# Patient Record
Sex: MALE | Race: WHITE | URBAN - METROPOLITAN AREA
[De-identification: names, ages, dates, MRNs, and addresses within clinical notes are randomized per-mention and may not be internally consistent; named-entity substitution may affect disease eponyms.]

---

## 2017-02-17 ENCOUNTER — DOCTOR'S OFFICE (OUTPATIENT)
Dept: URBAN - METROPOLITAN AREA CLINIC 136 | Facility: CLINIC | Age: 62
Setting detail: OPHTHALMOLOGY
End: 2017-02-17
Payer: MEDICARE

## 2017-02-17 DIAGNOSIS — H40.003: ICD-10-CM

## 2017-02-17 DIAGNOSIS — E10.3313: ICD-10-CM

## 2017-02-17 DIAGNOSIS — H04.123: ICD-10-CM

## 2017-02-17 DIAGNOSIS — E10.3311: ICD-10-CM

## 2017-02-17 DIAGNOSIS — H25.13: ICD-10-CM

## 2017-02-17 PROCEDURE — 67210 TREATMENT OF RETINAL LESION: CPT | Performed by: OPHTHALMOLOGY

## 2017-02-17 PROCEDURE — 92134 CPTRZ OPH DX IMG PST SGM RTA: CPT | Performed by: OPHTHALMOLOGY

## 2017-02-17 PROCEDURE — 92014 COMPRE OPH EXAM EST PT 1/>: CPT | Performed by: OPHTHALMOLOGY

## 2017-02-17 ASSESSMENT — REFRACTION_MANIFEST
OS_VA3: 20/
OU_VA: 20/
OS_VA3: 20/
OS_VA1: 20/
OD_VA3: 20/
OD_VA1: 20/
OD_VA2: 20/
OD_VA1: 20/
OS_VA2: 20/
OD_VA2: 20/
OS_VA2: 20/
OU_VA: 20/
OD_VA3: 20/
OS_VA1: 20/

## 2017-02-17 ASSESSMENT — REFRACTION_CURRENTRX
OS_CYLINDER: -0.75
OD_OVR_VA: 20/
OD_OVR_VA: 20/
OD_CYLINDER: -0.75
OS_OVR_VA: 20/
OS_VPRISM_DIRECTION: SV
OD_SPHERE: +3.50
OD_AXIS: 100
OS_AXIS: 069
OS_OVR_VA: 20/
OD_OVR_VA: 20/
OS_OVR_VA: 20/
OS_SPHERE: +0.50
OD_VPRISM_DIRECTION: SV

## 2017-02-17 ASSESSMENT — SUPERFICIAL PUNCTATE KERATITIS (SPK)
OD_SPK: 2+
OS_SPK: 2+

## 2017-02-17 ASSESSMENT — REFRACTION_OUTSIDERX
OD_VA2: 20/20
OS_CYLINDER: -0.50
OD_CYLINDER: SPH
OS_VA1: 20/20-2
OD_SPHERE: +1.50
OD_VA1: 20/20-1
OS_VA3: 20/
OU_VA: 20/20
OD_VA3: 20/
OS_ADD: +2.50
OS_AXIS: 055
OS_SPHERE: +1.25
OS_VA2: 20/20
OD_ADD: +2.50

## 2017-02-17 ASSESSMENT — REFRACTION_AUTOREFRACTION
OD_SPHERE: +0.75
OS_AXIS: 046
OD_AXIS: 070
OD_CYLINDER: -0.25
OS_CYLINDER: -2.50
OS_SPHERE: +2.00

## 2017-02-17 ASSESSMENT — SPHEQUIV_DERIVED
OS_SPHEQUIV: 0.75
OD_SPHEQUIV: 0.625

## 2017-02-17 ASSESSMENT — VISUAL ACUITY
OD_BCVA: 20/25+2
OS_BCVA: 20/25-2

## 2017-02-17 ASSESSMENT — CONFRONTATIONAL VISUAL FIELD TEST (CVF)
OS_FINDINGS: FULL
OD_FINDINGS: FULL

## 2017-04-17 ENCOUNTER — DOCTOR'S OFFICE (OUTPATIENT)
Dept: URBAN - METROPOLITAN AREA CLINIC 136 | Facility: CLINIC | Age: 62
Setting detail: OPHTHALMOLOGY
End: 2017-04-17
Payer: MEDICARE

## 2017-04-17 DIAGNOSIS — E10.3311: ICD-10-CM

## 2017-04-17 DIAGNOSIS — E10.3212: ICD-10-CM

## 2017-04-17 DIAGNOSIS — H04.123: ICD-10-CM

## 2017-04-17 DIAGNOSIS — H25.13: ICD-10-CM

## 2017-04-17 DIAGNOSIS — H40.003: ICD-10-CM

## 2017-04-17 PROCEDURE — 99024 POSTOP FOLLOW-UP VISIT: CPT | Performed by: OPHTHALMOLOGY

## 2017-04-17 PROCEDURE — 92134 CPTRZ OPH DX IMG PST SGM RTA: CPT | Performed by: OPHTHALMOLOGY

## 2017-04-17 ASSESSMENT — REFRACTION_MANIFEST
OD_VA3: 20/
OS_VA2: 20/
OU_VA: 20/
OS_VA2: 20/
OS_VA3: 20/
OD_VA3: 20/
OD_VA2: 20/
OD_VA1: 20/
OS_VA3: 20/
OU_VA: 20/
OD_VA1: 20/
OS_VA1: 20/
OD_VA2: 20/
OS_VA1: 20/

## 2017-04-17 ASSESSMENT — SPHEQUIV_DERIVED
OD_SPHEQUIV: 0.625
OS_SPHEQUIV: 0.75

## 2017-04-17 ASSESSMENT — REFRACTION_CURRENTRX
OS_OVR_VA: 20/
OS_AXIS: 069
OS_VPRISM_DIRECTION: SV
OS_OVR_VA: 20/
OD_AXIS: 100
OD_OVR_VA: 20/
OD_OVR_VA: 20/
OS_SPHERE: +0.50
OS_CYLINDER: -0.75
OS_OVR_VA: 20/
OD_CYLINDER: -0.75
OD_SPHERE: +3.50
OD_OVR_VA: 20/
OD_VPRISM_DIRECTION: SV

## 2017-04-17 ASSESSMENT — VISUAL ACUITY
OS_BCVA: 20/25+3
OD_BCVA: 20/20-2

## 2017-04-17 ASSESSMENT — REFRACTION_AUTOREFRACTION
OS_CYLINDER: -2.50
OS_SPHERE: +2.00
OS_AXIS: 046
OD_SPHERE: +0.75
OD_AXIS: 070
OD_CYLINDER: -0.25

## 2017-04-17 ASSESSMENT — SUPERFICIAL PUNCTATE KERATITIS (SPK)
OD_SPK: 2+
OS_SPK: 2+

## 2017-04-17 ASSESSMENT — REFRACTION_OUTSIDERX
OU_VA: 20/20
OD_VA1: 20/20-1
OS_SPHERE: +1.25
OS_ADD: +2.50
OS_VA3: 20/
OS_VA2: 20/20
OS_VA1: 20/20-2
OD_CYLINDER: SPH
OS_CYLINDER: -0.50
OS_AXIS: 055
OD_SPHERE: +1.50
OD_VA3: 20/
OD_ADD: +2.50
OD_VA2: 20/20

## 2017-04-17 ASSESSMENT — CONFRONTATIONAL VISUAL FIELD TEST (CVF)
OS_FINDINGS: FULL
OD_FINDINGS: FULL

## 2017-06-19 ENCOUNTER — DOCTOR'S OFFICE (OUTPATIENT)
Dept: URBAN - METROPOLITAN AREA CLINIC 136 | Facility: CLINIC | Age: 62
Setting detail: OPHTHALMOLOGY
End: 2017-06-19
Payer: MEDICARE

## 2017-06-19 DIAGNOSIS — E10.3212: ICD-10-CM

## 2017-06-19 DIAGNOSIS — H40.003: ICD-10-CM

## 2017-06-19 DIAGNOSIS — H04.123: ICD-10-CM

## 2017-06-19 DIAGNOSIS — H25.13: ICD-10-CM

## 2017-06-19 DIAGNOSIS — E10.3311: ICD-10-CM

## 2017-06-19 PROCEDURE — 92134 CPTRZ OPH DX IMG PST SGM RTA: CPT | Performed by: OPHTHALMOLOGY

## 2017-06-19 PROCEDURE — 92014 COMPRE OPH EXAM EST PT 1/>: CPT | Performed by: OPHTHALMOLOGY

## 2017-06-19 ASSESSMENT — REFRACTION_OUTSIDERX
OU_VA: 20/20
OS_ADD: +2.50
OD_SPHERE: +1.50
OD_VA2: 20/20
OS_CYLINDER: -0.50
OS_AXIS: 055
OS_VA3: 20/
OD_VA3: 20/
OS_VA2: 20/20
OS_SPHERE: +1.25
OD_ADD: +2.50
OD_CYLINDER: SPH
OD_VA1: 20/20-1
OS_VA1: 20/20-2

## 2017-06-19 ASSESSMENT — REFRACTION_MANIFEST
OD_VA1: 20/
OS_VA2: 20/
OS_VA1: 20/
OS_VA2: 20/
OD_VA2: 20/
OS_VA3: 20/
OU_VA: 20/
OD_VA3: 20/
OD_VA3: 20/
OS_VA3: 20/
OU_VA: 20/
OD_VA2: 20/
OS_VA1: 20/
OD_VA1: 20/

## 2017-06-19 ASSESSMENT — REFRACTION_CURRENTRX
OD_OVR_VA: 20/
OS_VPRISM_DIRECTION: SV
OD_CYLINDER: -0.75
OS_AXIS: 069
OS_CYLINDER: -0.75
OS_SPHERE: +0.50
OD_OVR_VA: 20/
OS_OVR_VA: 20/
OS_OVR_VA: 20/
OD_VPRISM_DIRECTION: SV
OD_SPHERE: +3.50
OD_AXIS: 100
OS_OVR_VA: 20/
OD_OVR_VA: 20/

## 2017-06-19 ASSESSMENT — SUPERFICIAL PUNCTATE KERATITIS (SPK)
OD_SPK: 2+
OS_SPK: 2+

## 2017-06-19 ASSESSMENT — VISUAL ACUITY
OS_BCVA: 20/25+2
OD_BCVA: 20/25-1

## 2017-06-19 ASSESSMENT — SPHEQUIV_DERIVED
OD_SPHEQUIV: 0.625
OS_SPHEQUIV: 0.75

## 2017-06-19 ASSESSMENT — REFRACTION_AUTOREFRACTION
OD_AXIS: 070
OD_CYLINDER: -0.25
OS_AXIS: 046
OS_CYLINDER: -2.50
OD_SPHERE: +0.75
OS_SPHERE: +2.00

## 2017-06-19 ASSESSMENT — CONFRONTATIONAL VISUAL FIELD TEST (CVF)
OS_FINDINGS: FULL
OD_FINDINGS: FULL

## 2017-08-24 ENCOUNTER — DOCTOR'S OFFICE (OUTPATIENT)
Dept: URBAN - METROPOLITAN AREA CLINIC 136 | Facility: CLINIC | Age: 62
Setting detail: OPHTHALMOLOGY
End: 2017-08-24
Payer: MEDICARE

## 2017-08-24 DIAGNOSIS — H04.123: ICD-10-CM

## 2017-08-24 DIAGNOSIS — H25.13: ICD-10-CM

## 2017-08-24 DIAGNOSIS — E10.3212: ICD-10-CM

## 2017-08-24 DIAGNOSIS — E10.3311: ICD-10-CM

## 2017-08-24 DIAGNOSIS — H40.003: ICD-10-CM

## 2017-08-24 PROCEDURE — 92014 COMPRE OPH EXAM EST PT 1/>: CPT | Performed by: OPHTHALMOLOGY

## 2017-08-24 PROCEDURE — 92134 CPTRZ OPH DX IMG PST SGM RTA: CPT | Performed by: OPHTHALMOLOGY

## 2017-08-24 ASSESSMENT — REFRACTION_MANIFEST
OS_VA2: 20/
OU_VA: 20/
OU_VA: 20/
OS_VA2: 20/
OD_VA1: 20/
OS_VA3: 20/
OS_VA1: 20/
OS_VA3: 20/
OD_VA2: 20/
OD_VA3: 20/
OD_VA1: 20/
OS_VA1: 20/
OD_VA2: 20/
OD_VA3: 20/

## 2017-08-24 ASSESSMENT — REFRACTION_CURRENTRX
OD_OVR_VA: 20/
OD_AXIS: 100
OD_VPRISM_DIRECTION: SV
OS_OVR_VA: 20/
OS_VPRISM_DIRECTION: SV
OD_SPHERE: +3.50
OD_OVR_VA: 20/
OS_OVR_VA: 20/
OD_CYLINDER: -0.75
OD_OVR_VA: 20/
OS_CYLINDER: -0.75
OS_OVR_VA: 20/
OS_SPHERE: +0.50
OS_AXIS: 069

## 2017-08-24 ASSESSMENT — CONFRONTATIONAL VISUAL FIELD TEST (CVF)
OD_FINDINGS: FULL
OS_FINDINGS: FULL

## 2017-08-24 ASSESSMENT — REFRACTION_OUTSIDERX
OD_VA1: 20/20-1
OD_ADD: +2.50
OS_SPHERE: +1.25
OD_SPHERE: +1.50
OS_VA1: 20/20-2
OS_ADD: +2.50
OS_VA3: 20/
OD_CYLINDER: SPH
OD_VA3: 20/
OU_VA: 20/20
OS_AXIS: 055
OS_CYLINDER: -0.50
OS_VA2: 20/20
OD_VA2: 20/20

## 2017-08-24 ASSESSMENT — REFRACTION_AUTOREFRACTION
OS_SPHERE: +2.00
OD_AXIS: 070
OS_CYLINDER: -2.50
OS_AXIS: 046
OD_SPHERE: +0.75
OD_CYLINDER: -0.25

## 2017-08-24 ASSESSMENT — VISUAL ACUITY
OS_BCVA: 20/25-1
OD_BCVA: 20/20-1

## 2017-08-24 ASSESSMENT — SUPERFICIAL PUNCTATE KERATITIS (SPK)
OS_SPK: 2+
OD_SPK: 2+

## 2017-08-24 ASSESSMENT — SPHEQUIV_DERIVED
OS_SPHEQUIV: 0.75
OD_SPHEQUIV: 0.625

## 2017-11-15 ENCOUNTER — RX ONLY (RX ONLY)
Age: 62
End: 2017-11-15

## 2017-11-15 ENCOUNTER — DOCTOR'S OFFICE (OUTPATIENT)
Dept: URBAN - METROPOLITAN AREA CLINIC 136 | Facility: CLINIC | Age: 62
Setting detail: OPHTHALMOLOGY
End: 2017-11-15
Payer: MEDICARE

## 2017-11-15 DIAGNOSIS — H04.123: ICD-10-CM

## 2017-11-15 DIAGNOSIS — H40.003: ICD-10-CM

## 2017-11-15 DIAGNOSIS — E10.3291: ICD-10-CM

## 2017-11-15 DIAGNOSIS — E10.3312: ICD-10-CM

## 2017-11-15 DIAGNOSIS — Z79.4: ICD-10-CM

## 2017-11-15 DIAGNOSIS — H25.13: ICD-10-CM

## 2017-11-15 PROCEDURE — 92134 CPTRZ OPH DX IMG PST SGM RTA: CPT | Performed by: OPHTHALMOLOGY

## 2017-11-15 PROCEDURE — 92012 INTRM OPH EXAM EST PATIENT: CPT | Performed by: OPHTHALMOLOGY

## 2017-11-15 ASSESSMENT — REFRACTION_CURRENTRX
OD_OVR_VA: 20/
OS_OVR_VA: 20/
OD_AXIS: 100
OD_CYLINDER: -0.75
OS_OVR_VA: 20/
OS_AXIS: 069
OD_SPHERE: +3.50
OS_VPRISM_DIRECTION: SV
OS_CYLINDER: -0.75
OS_SPHERE: +0.50
OS_OVR_VA: 20/
OD_VPRISM_DIRECTION: SV

## 2017-11-15 ASSESSMENT — REFRACTION_MANIFEST
OU_VA: 20/
OS_VA1: 20/
OS_VA3: 20/
OS_VA2: 20/
OD_VA1: 20/
OU_VA: 20/
OS_VA1: 20/
OS_VA3: 20/
OD_VA1: 20/
OD_VA3: 20/
OD_VA3: 20/
OD_VA2: 20/
OS_VA2: 20/
OD_VA2: 20/

## 2017-11-15 ASSESSMENT — CONFRONTATIONAL VISUAL FIELD TEST (CVF)
OD_FINDINGS: FULL
OS_FINDINGS: FULL

## 2017-11-15 ASSESSMENT — REFRACTION_OUTSIDERX
OS_VA2: 20/20
OS_AXIS: 055
OS_CYLINDER: -0.50
OS_VA3: 20/
OD_SPHERE: +1.50
OS_ADD: +2.50
OU_VA: 20/20
OD_VA3: 20/
OD_VA2: 20/20
OD_ADD: +2.50
OD_VA1: 20/20-1
OS_SPHERE: +1.25
OS_VA1: 20/20-2
OD_CYLINDER: SPH

## 2017-11-15 ASSESSMENT — REFRACTION_AUTOREFRACTION
OS_CYLINDER: -2.50
OD_SPHERE: +0.75
OD_AXIS: 070
OS_SPHERE: +2.00
OD_CYLINDER: -0.25
OS_AXIS: 046

## 2017-11-15 ASSESSMENT — SPHEQUIV_DERIVED
OD_SPHEQUIV: 0.625
OS_SPHEQUIV: 0.75

## 2017-11-15 ASSESSMENT — SUPERFICIAL PUNCTATE KERATITIS (SPK)
OD_SPK: 2+
OS_SPK: 2+

## 2017-11-15 ASSESSMENT — VISUAL ACUITY
OS_BCVA: 20/30-1
OD_BCVA: 20/40-1

## 2018-05-16 ENCOUNTER — RX ONLY (RX ONLY)
Age: 63
End: 2018-05-16

## 2018-05-16 ENCOUNTER — DOCTOR'S OFFICE (OUTPATIENT)
Dept: URBAN - METROPOLITAN AREA CLINIC 136 | Facility: CLINIC | Age: 63
Setting detail: OPHTHALMOLOGY
End: 2018-05-16
Payer: MEDICARE

## 2018-05-16 DIAGNOSIS — H04.123: ICD-10-CM

## 2018-05-16 DIAGNOSIS — Z79.4: ICD-10-CM

## 2018-05-16 DIAGNOSIS — H40.003: ICD-10-CM

## 2018-05-16 DIAGNOSIS — E10.3291: ICD-10-CM

## 2018-05-16 DIAGNOSIS — E10.3312: ICD-10-CM

## 2018-05-16 DIAGNOSIS — H25.13: ICD-10-CM

## 2018-05-16 PROCEDURE — 92014 COMPRE OPH EXAM EST PT 1/>: CPT | Performed by: OPHTHALMOLOGY

## 2018-05-16 PROCEDURE — 92134 CPTRZ OPH DX IMG PST SGM RTA: CPT | Performed by: OPHTHALMOLOGY

## 2018-05-16 ASSESSMENT — SUPERFICIAL PUNCTATE KERATITIS (SPK)
OS_SPK: 2+
OD_SPK: 2+

## 2018-05-16 ASSESSMENT — REFRACTION_MANIFEST
OS_VA3: 20/
OD_VA1: 20/
OS_VA2: 20/
OU_VA: 20/
OD_VA3: 20/
OU_VA: 20/
OD_VA3: 20/
OS_VA1: 20/
OS_VA1: 20/
OD_VA1: 20/
OS_VA3: 20/
OS_VA2: 20/
OD_VA2: 20/
OD_VA2: 20/

## 2018-05-16 ASSESSMENT — REFRACTION_AUTOREFRACTION
OD_CYLINDER: -0.25
OD_AXIS: 070
OD_SPHERE: +0.75
OS_SPHERE: +2.00
OS_CYLINDER: -2.50
OS_AXIS: 046

## 2018-05-16 ASSESSMENT — REFRACTION_CURRENTRX
OS_OVR_VA: 20/
OS_SPHERE: +0.50
OS_OVR_VA: 20/
OD_VPRISM_DIRECTION: SV
OD_OVR_VA: 20/
OD_AXIS: 100
OS_OVR_VA: 20/
OD_SPHERE: +3.50
OD_OVR_VA: 20/
OS_AXIS: 069
OD_OVR_VA: 20/
OS_CYLINDER: -0.75
OD_CYLINDER: -0.75
OS_VPRISM_DIRECTION: SV

## 2018-05-16 ASSESSMENT — SPHEQUIV_DERIVED
OS_SPHEQUIV: 0.75
OD_SPHEQUIV: 0.625

## 2018-05-16 ASSESSMENT — REFRACTION_OUTSIDERX
OS_VA2: 20/20
OU_VA: 20/20
OD_CYLINDER: SPH
OD_VA1: 20/20-1
OD_VA2: 20/20
OS_ADD: +2.50
OS_CYLINDER: -0.50
OS_VA1: 20/20-2
OD_SPHERE: +1.50
OS_AXIS: 055
OD_VA3: 20/
OS_SPHERE: +1.25
OS_VA3: 20/
OD_ADD: +2.50

## 2018-05-16 ASSESSMENT — CONFRONTATIONAL VISUAL FIELD TEST (CVF)
OS_FINDINGS: FULL
OD_FINDINGS: FULL

## 2018-05-16 ASSESSMENT — VISUAL ACUITY
OS_BCVA: 20/30-1
OD_BCVA: 20/40

## 2018-11-15 ENCOUNTER — DOCTOR'S OFFICE (OUTPATIENT)
Dept: URBAN - METROPOLITAN AREA CLINIC 136 | Facility: CLINIC | Age: 63
Setting detail: OPHTHALMOLOGY
End: 2018-11-15
Payer: MEDICARE

## 2018-11-15 DIAGNOSIS — H40.013: ICD-10-CM

## 2018-11-15 DIAGNOSIS — H34.8321: ICD-10-CM

## 2018-11-15 DIAGNOSIS — H04.123: ICD-10-CM

## 2018-11-15 DIAGNOSIS — Z79.4: ICD-10-CM

## 2018-11-15 DIAGNOSIS — E10.3213: ICD-10-CM

## 2018-11-15 DIAGNOSIS — H25.13: ICD-10-CM

## 2018-11-15 PROCEDURE — 92134 CPTRZ OPH DX IMG PST SGM RTA: CPT | Performed by: OPHTHALMOLOGY

## 2018-11-15 PROCEDURE — 92014 COMPRE OPH EXAM EST PT 1/>: CPT | Performed by: OPHTHALMOLOGY

## 2018-11-15 ASSESSMENT — SUPERFICIAL PUNCTATE KERATITIS (SPK)
OD_SPK: 2+
OS_SPK: 2+

## 2018-11-15 ASSESSMENT — CONFRONTATIONAL VISUAL FIELD TEST (CVF)
OD_FINDINGS: FULL
OS_FINDINGS: FULL

## 2018-11-16 ASSESSMENT — REFRACTION_CURRENTRX
OD_CYLINDER: -0.75
OS_OVR_VA: 20/
OS_OVR_VA: 20/
OS_AXIS: 069
OD_OVR_VA: 20/
OD_VPRISM_DIRECTION: SV
OD_OVR_VA: 20/
OS_OVR_VA: 20/
OD_AXIS: 100
OD_OVR_VA: 20/
OS_SPHERE: +0.50
OS_VPRISM_DIRECTION: SV
OS_CYLINDER: -0.75
OD_SPHERE: +3.50

## 2018-11-16 ASSESSMENT — REFRACTION_MANIFEST
OD_VA3: 20/
OS_ADD: +2.50
OS_VA3: 20/
OS_VA1: 20/20-2
OS_VA3: 20/
OS_SPHERE: +1.25
OD_ADD: +2.50
OD_SPHERE: +1.50
OD_VA1: 20/20-1
OS_CYLINDER: -0.50
OD_VA3: 20/
OS_AXIS: 055
OS_VA2: 20/
OU_VA: 20/
OD_VA2: 20/
OD_VA2: 20/20
OS_VA1: 20/
OD_CYLINDER: SPH
OU_VA: 20/20
OD_VA1: 20/
OS_VA2: 20/20

## 2018-11-16 ASSESSMENT — SPHEQUIV_DERIVED
OD_SPHEQUIV: 0.625
OS_SPHEQUIV: 0.75
OS_SPHEQUIV: 1

## 2018-11-16 ASSESSMENT — REFRACTION_AUTOREFRACTION
OS_AXIS: 046
OD_SPHERE: +0.75
OS_SPHERE: +2.00
OD_AXIS: 070
OS_CYLINDER: -2.50
OD_CYLINDER: -0.25

## 2018-11-16 ASSESSMENT — VISUAL ACUITY
OS_BCVA: 20/25-2
OD_BCVA: 20/25-2

## 2018-12-26 ENCOUNTER — DOCTOR'S OFFICE (OUTPATIENT)
Dept: URBAN - METROPOLITAN AREA CLINIC 136 | Facility: CLINIC | Age: 63
Setting detail: OPHTHALMOLOGY
End: 2018-12-26
Payer: MEDICARE

## 2018-12-26 DIAGNOSIS — H40.003: ICD-10-CM

## 2018-12-26 DIAGNOSIS — E10.3291: ICD-10-CM

## 2018-12-26 DIAGNOSIS — Z79.4: ICD-10-CM

## 2018-12-26 DIAGNOSIS — E10.3212: ICD-10-CM

## 2018-12-26 DIAGNOSIS — H25.13: ICD-10-CM

## 2018-12-26 DIAGNOSIS — H04.123: ICD-10-CM

## 2018-12-26 DIAGNOSIS — H34.8321: ICD-10-CM

## 2018-12-26 PROCEDURE — 92235 FLUORESCEIN ANGRPH MLTIFRAME: CPT | Performed by: OPHTHALMOLOGY

## 2018-12-26 PROCEDURE — 92014 COMPRE OPH EXAM EST PT 1/>: CPT | Performed by: OPHTHALMOLOGY

## 2018-12-26 PROCEDURE — 92134 CPTRZ OPH DX IMG PST SGM RTA: CPT | Performed by: OPHTHALMOLOGY

## 2018-12-26 ASSESSMENT — CONFRONTATIONAL VISUAL FIELD TEST (CVF)
OS_FINDINGS: FULL
OD_FINDINGS: FULL

## 2018-12-26 ASSESSMENT — SUPERFICIAL PUNCTATE KERATITIS (SPK)
OS_SPK: 2+
OD_SPK: 2+

## 2018-12-27 ASSESSMENT — REFRACTION_MANIFEST
OD_VA1: 20/
OS_VA3: 20/
OS_SPHERE: +1.25
OS_VA1: 20/20-2
OD_ADD: +2.50
OD_VA1: 20/20-1
OU_VA: 20/
OD_CYLINDER: SPH
OD_VA2: 20/20
OU_VA: 20/20
OS_VA2: 20/
OD_VA3: 20/
OS_VA1: 20/
OS_ADD: +2.50
OD_VA2: 20/
OD_VA3: 20/
OS_VA2: 20/20
OS_VA3: 20/
OS_AXIS: 055
OD_SPHERE: +1.50
OS_CYLINDER: -0.50

## 2018-12-27 ASSESSMENT — REFRACTION_AUTOREFRACTION
OD_CYLINDER: -0.25
OS_SPHERE: +2.00
OD_AXIS: 070
OS_CYLINDER: -2.50
OS_AXIS: 046
OD_SPHERE: +0.75

## 2018-12-27 ASSESSMENT — REFRACTION_CURRENTRX
OS_OVR_VA: 20/
OD_OVR_VA: 20/
OS_OVR_VA: 20/
OD_OVR_VA: 20/
OS_SPHERE: +0.50
OS_CYLINDER: -0.75
OD_AXIS: 100
OS_OVR_VA: 20/
OS_AXIS: 069
OD_VPRISM_DIRECTION: SV
OD_CYLINDER: -0.75
OD_OVR_VA: 20/
OS_VPRISM_DIRECTION: SV
OD_SPHERE: +3.50

## 2018-12-27 ASSESSMENT — SPHEQUIV_DERIVED
OS_SPHEQUIV: 0.75
OS_SPHEQUIV: 1
OD_SPHEQUIV: 0.625

## 2018-12-27 ASSESSMENT — VISUAL ACUITY
OD_BCVA: 20/40-1
OS_BCVA: 20/40

## 2019-01-09 ENCOUNTER — DOCTOR'S OFFICE (OUTPATIENT)
Dept: URBAN - METROPOLITAN AREA CLINIC 136 | Facility: CLINIC | Age: 64
Setting detail: OPHTHALMOLOGY
End: 2019-01-09
Payer: MEDICARE

## 2019-01-09 DIAGNOSIS — H34.8321: ICD-10-CM

## 2019-01-09 PROCEDURE — 67228 TREATMENT X10SV RETINOPATHY: CPT | Performed by: OPHTHALMOLOGY

## 2019-01-09 ASSESSMENT — REFRACTION_CURRENTRX
OD_OVR_VA: 20/
OS_OVR_VA: 20/
OD_VPRISM_DIRECTION: SV
OD_OVR_VA: 20/
OS_SPHERE: +0.50
OD_OVR_VA: 20/
OS_AXIS: 069
OS_OVR_VA: 20/
OD_CYLINDER: -0.75
OD_SPHERE: +3.50
OS_VPRISM_DIRECTION: SV
OS_OVR_VA: 20/
OD_AXIS: 100
OS_CYLINDER: -0.75

## 2019-01-09 ASSESSMENT — REFRACTION_MANIFEST
OD_CYLINDER: SPH
OD_VA1: 20/20-1
OS_VA3: 20/
OS_AXIS: 055
OD_VA2: 20/
OD_VA2: 20/20
OU_VA: 20/20
OD_VA1: 20/
OS_VA2: 20/
OS_VA2: 20/20
OU_VA: 20/
OS_SPHERE: +1.25
OS_VA3: 20/
OS_VA1: 20/
OD_VA3: 20/
OD_VA3: 20/
OD_SPHERE: +1.50
OS_CYLINDER: -0.50
OD_ADD: +2.50
OS_ADD: +2.50
OS_VA1: 20/20-2

## 2019-01-09 ASSESSMENT — REFRACTION_AUTOREFRACTION
OD_CYLINDER: -0.25
OS_AXIS: 046
OS_SPHERE: +2.00
OD_AXIS: 070
OS_CYLINDER: -2.50
OD_SPHERE: +0.75

## 2019-01-09 ASSESSMENT — SPHEQUIV_DERIVED
OS_SPHEQUIV: 1
OS_SPHEQUIV: 0.75
OD_SPHEQUIV: 0.625

## 2019-01-09 ASSESSMENT — VISUAL ACUITY
OD_BCVA: 20/40-1
OS_BCVA: 20/40

## 2019-02-13 ENCOUNTER — DOCTOR'S OFFICE (OUTPATIENT)
Dept: URBAN - METROPOLITAN AREA CLINIC 136 | Facility: CLINIC | Age: 64
Setting detail: OPHTHALMOLOGY
End: 2019-02-13
Payer: MEDICARE

## 2019-02-13 DIAGNOSIS — Z79.4: ICD-10-CM

## 2019-02-13 DIAGNOSIS — H34.8321: ICD-10-CM

## 2019-02-13 DIAGNOSIS — H25.13: ICD-10-CM

## 2019-02-13 DIAGNOSIS — E10.3293: ICD-10-CM

## 2019-02-13 PROCEDURE — 92134 CPTRZ OPH DX IMG PST SGM RTA: CPT | Performed by: OPHTHALMOLOGY

## 2019-02-13 PROCEDURE — 92014 COMPRE OPH EXAM EST PT 1/>: CPT | Performed by: OPHTHALMOLOGY

## 2019-02-13 ASSESSMENT — REFRACTION_CURRENTRX
OS_CYLINDER: -0.75
OD_OVR_VA: 20/
OD_OVR_VA: 20/
OS_VPRISM_DIRECTION: SV
OS_AXIS: 069
OS_OVR_VA: 20/
OD_AXIS: 100
OS_OVR_VA: 20/
OS_SPHERE: +0.50
OD_SPHERE: +3.50
OD_VPRISM_DIRECTION: SV
OD_OVR_VA: 20/
OD_CYLINDER: -0.75
OS_OVR_VA: 20/

## 2019-02-13 ASSESSMENT — REFRACTION_MANIFEST
OS_CYLINDER: -0.50
OD_VA1: 20/
OD_VA2: 20/
OS_VA3: 20/
OS_VA1: 20/20-2
OS_VA2: 20/
OU_VA: 20/20
OD_VA2: 20/20
OD_SPHERE: +1.50
OD_VA1: 20/20-1
OS_VA1: 20/
OD_VA3: 20/
OS_AXIS: 055
OD_VA3: 20/
OU_VA: 20/
OD_ADD: +2.50
OS_SPHERE: +1.25
OD_CYLINDER: SPH
OS_VA3: 20/
OS_VA2: 20/20
OS_ADD: +2.50

## 2019-02-13 ASSESSMENT — REFRACTION_AUTOREFRACTION
OS_SPHERE: +2.00
OS_CYLINDER: -2.50
OD_SPHERE: +0.75
OD_CYLINDER: -0.25
OD_AXIS: 070
OS_AXIS: 046

## 2019-02-13 ASSESSMENT — SPHEQUIV_DERIVED
OD_SPHEQUIV: 0.625
OS_SPHEQUIV: 1
OS_SPHEQUIV: 0.75

## 2019-02-13 ASSESSMENT — VISUAL ACUITY
OD_BCVA: 20/60+2
OS_BCVA: 20/70+2

## 2019-02-13 ASSESSMENT — CONFRONTATIONAL VISUAL FIELD TEST (CVF)
OS_FINDINGS: FULL
OD_FINDINGS: FULL

## 2019-02-13 ASSESSMENT — SUPERFICIAL PUNCTATE KERATITIS (SPK)
OS_SPK: 2+
OD_SPK: 2+

## 2019-03-14 ENCOUNTER — DOCTOR'S OFFICE (OUTPATIENT)
Dept: URBAN - METROPOLITAN AREA CLINIC 136 | Facility: CLINIC | Age: 64
Setting detail: OPHTHALMOLOGY
End: 2019-03-14
Payer: MEDICARE

## 2019-03-14 DIAGNOSIS — E10.3292: ICD-10-CM

## 2019-03-14 DIAGNOSIS — E10.3391: ICD-10-CM

## 2019-03-14 DIAGNOSIS — Z79.4: ICD-10-CM

## 2019-03-14 DIAGNOSIS — H25.13: ICD-10-CM

## 2019-03-14 DIAGNOSIS — H34.8321: ICD-10-CM

## 2019-03-14 PROCEDURE — 67228 TREATMENT X10SV RETINOPATHY: CPT | Performed by: OPHTHALMOLOGY

## 2019-03-14 PROCEDURE — 92134 CPTRZ OPH DX IMG PST SGM RTA: CPT | Performed by: OPHTHALMOLOGY

## 2019-03-14 PROCEDURE — 92014 COMPRE OPH EXAM EST PT 1/>: CPT | Performed by: OPHTHALMOLOGY

## 2019-03-14 ASSESSMENT — REFRACTION_CURRENTRX
OD_OVR_VA: 20/
OD_OVR_VA: 20/
OD_SPHERE: +3.50
OS_OVR_VA: 20/
OS_OVR_VA: 20/
OS_CYLINDER: -0.75
OS_AXIS: 069
OS_OVR_VA: 20/
OS_VPRISM_DIRECTION: SV
OD_OVR_VA: 20/
OD_CYLINDER: -0.75
OD_VPRISM_DIRECTION: SV
OS_SPHERE: +0.50
OD_AXIS: 100

## 2019-03-14 ASSESSMENT — CONFRONTATIONAL VISUAL FIELD TEST (CVF)
OD_FINDINGS: FULL
OS_FINDINGS: FULL

## 2019-03-14 ASSESSMENT — SUPERFICIAL PUNCTATE KERATITIS (SPK)
OS_SPK: 2+
OD_SPK: 2+

## 2019-03-14 ASSESSMENT — REFRACTION_MANIFEST
OD_CYLINDER: SPH
OU_VA: 20/20
OS_VA2: 20/20
OS_ADD: +2.50
OD_SPHERE: +1.50
OS_VA3: 20/
OD_ADD: +2.50
OU_VA: 20/
OD_VA1: 20/20-1
OS_VA3: 20/
OD_VA1: 20/
OD_VA3: 20/
OD_VA3: 20/
OS_AXIS: 055
OS_VA1: 20/
OS_VA2: 20/
OS_CYLINDER: -0.50
OD_VA2: 20/
OD_VA2: 20/20
OS_SPHERE: +1.25
OS_VA1: 20/20-2

## 2019-03-14 ASSESSMENT — SPHEQUIV_DERIVED
OS_SPHEQUIV: 1
OD_SPHEQUIV: 0.625
OS_SPHEQUIV: 0.75

## 2019-03-14 ASSESSMENT — VISUAL ACUITY
OS_BCVA: 20/60-2
OD_BCVA: 20/30-2

## 2019-03-14 ASSESSMENT — REFRACTION_AUTOREFRACTION
OS_AXIS: 046
OD_AXIS: 070
OD_CYLINDER: -0.25
OS_SPHERE: +2.00
OD_SPHERE: +0.75
OS_CYLINDER: -2.50

## 2019-04-18 ENCOUNTER — DOCTOR'S OFFICE (OUTPATIENT)
Dept: URBAN - METROPOLITAN AREA CLINIC 136 | Facility: CLINIC | Age: 64
Setting detail: OPHTHALMOLOGY
End: 2019-04-18
Payer: MEDICARE

## 2019-04-18 DIAGNOSIS — E10.3593: ICD-10-CM

## 2019-04-18 DIAGNOSIS — H25.13: ICD-10-CM

## 2019-04-18 DIAGNOSIS — H34.8321: ICD-10-CM

## 2019-04-18 DIAGNOSIS — Z79.4: ICD-10-CM

## 2019-04-18 PROBLEM — H52.223 HYPEROPIA WITH ASTIGMATISM AND PRESBYOPIA OU ; BOTH EYES: Status: ACTIVE | Noted: 2017-02-17

## 2019-04-18 PROBLEM — H40.003 GLAUCOMA SUSPECT; BOTH EYES: Status: ACTIVE | Noted: 2017-02-17

## 2019-04-18 PROBLEM — E10.3592 DM TYPE 1; RIGHT PROLIFERATIVE WITHOUT ME, LEFT PROLIFERATIVE WITHOUT ME: Status: ACTIVE | Noted: 2019-04-18

## 2019-04-18 PROBLEM — H52.03 HYPEROPIA WITH ASTIGMATISM AND PRESBYOPIA OU ; BOTH EYES: Status: ACTIVE | Noted: 2017-02-17

## 2019-04-18 PROBLEM — E10.3591 DM TYPE 1; RIGHT PROLIFERATIVE WITHOUT ME, LEFT PROLIFERATIVE WITHOUT ME: Status: ACTIVE | Noted: 2019-04-18

## 2019-04-18 PROBLEM — H52.4 HYPEROPIA WITH ASTIGMATISM AND PRESBYOPIA OU ; BOTH EYES: Status: ACTIVE | Noted: 2017-02-17

## 2019-04-18 PROBLEM — H04.123 DRY EYE; BOTH EYES: Status: ACTIVE | Noted: 2017-02-17

## 2019-04-18 PROCEDURE — 92014 COMPRE OPH EXAM EST PT 1/>: CPT | Performed by: OPHTHALMOLOGY

## 2019-04-18 PROCEDURE — 92134 CPTRZ OPH DX IMG PST SGM RTA: CPT | Performed by: OPHTHALMOLOGY

## 2019-04-18 ASSESSMENT — CONFRONTATIONAL VISUAL FIELD TEST (CVF)
OS_FINDINGS: FULL
OD_FINDINGS: FULL

## 2019-04-18 ASSESSMENT — REFRACTION_MANIFEST
OD_VA1: 20/20-1
OS_ADD: +2.50
OS_VA1: 20/
OD_VA3: 20/
OS_SPHERE: +1.25
OD_VA2: 20/20
OD_VA1: 20/
OS_VA2: 20/
OS_VA2: 20/20
OU_VA: 20/
OD_SPHERE: +1.50
OD_CYLINDER: SPH
OS_VA3: 20/
OD_VA2: 20/
OD_VA3: 20/
OD_ADD: +2.50
OS_VA1: 20/20-2
OS_CYLINDER: -0.50
OS_AXIS: 055
OU_VA: 20/20
OS_VA3: 20/

## 2019-04-18 ASSESSMENT — VISUAL ACUITY
OS_BCVA: 20/50+2
OD_BCVA: 20/40-1

## 2019-04-18 ASSESSMENT — REFRACTION_CURRENTRX
OD_SPHERE: +3.50
OS_SPHERE: +0.50
OS_CYLINDER: -0.75
OD_VPRISM_DIRECTION: SV
OS_AXIS: 069
OD_AXIS: 100
OD_OVR_VA: 20/
OD_OVR_VA: 20/
OD_CYLINDER: -0.75
OS_OVR_VA: 20/
OD_OVR_VA: 20/
OS_VPRISM_DIRECTION: SV
OS_OVR_VA: 20/
OS_OVR_VA: 20/

## 2019-04-18 ASSESSMENT — REFRACTION_AUTOREFRACTION
OS_CYLINDER: -2.50
OS_AXIS: 046
OS_SPHERE: +2.00
OD_CYLINDER: -0.25
OD_AXIS: 070
OD_SPHERE: +0.75

## 2019-04-18 ASSESSMENT — SUPERFICIAL PUNCTATE KERATITIS (SPK)
OS_SPK: 2+
OD_SPK: 2+

## 2019-04-18 ASSESSMENT — SPHEQUIV_DERIVED
OS_SPHEQUIV: 1
OS_SPHEQUIV: 0.75
OD_SPHEQUIV: 0.625

## 2019-12-02 ENCOUNTER — HOSPITAL ENCOUNTER (EMERGENCY)
Facility: HOSPITAL | Age: 64
Discharge: HOME | End: 2019-12-02
Attending: EMERGENCY MEDICINE | Admitting: EMERGENCY MEDICINE
Payer: COMMERCIAL

## 2019-12-02 ENCOUNTER — APPOINTMENT (EMERGENCY)
Dept: RADIOLOGY | Facility: HOSPITAL | Age: 64
End: 2019-12-02
Payer: COMMERCIAL

## 2019-12-02 VITALS
RESPIRATION RATE: 16 BRPM | SYSTOLIC BLOOD PRESSURE: 151 MMHG | HEART RATE: 91 BPM | WEIGHT: 197.53 LBS | OXYGEN SATURATION: 98 % | TEMPERATURE: 97.8 F | DIASTOLIC BLOOD PRESSURE: 81 MMHG

## 2019-12-02 DIAGNOSIS — R19.7 DIARRHEA: ICD-10-CM

## 2019-12-02 DIAGNOSIS — R19.7 NAUSEA VOMITING AND DIARRHEA: Primary | ICD-10-CM

## 2019-12-02 DIAGNOSIS — R07.9 CHEST PAIN: ICD-10-CM

## 2019-12-02 DIAGNOSIS — E86.0 DEHYDRATION: ICD-10-CM

## 2019-12-02 DIAGNOSIS — R11.2 NAUSEA VOMITING AND DIARRHEA: Primary | ICD-10-CM

## 2019-12-02 LAB
ALBUMIN SERPL BCP-MCNC: 3.4 G/DL (ref 3.5–5)
ALP SERPL-CCNC: 90 U/L (ref 46–116)
ALT SERPL W P-5'-P-CCNC: 21 U/L (ref 12–78)
ANION GAP SERPL CALCULATED.3IONS-SCNC: 9 MMOL/L (ref 4–13)
AST SERPL W P-5'-P-CCNC: 17 U/L (ref 5–45)
ATRIAL RATE: 89 BPM
ATRIAL RATE: 99 BPM
BASOPHILS # BLD AUTO: 0.05 THOUSANDS/ΜL (ref 0–0.1)
BASOPHILS NFR BLD AUTO: 1 % (ref 0–1)
BILIRUB SERPL-MCNC: 0.71 MG/DL (ref 0.2–1)
BUN SERPL-MCNC: 20 MG/DL (ref 5–25)
CALCIUM SERPL-MCNC: 8.8 MG/DL (ref 8.3–10.1)
CHLORIDE SERPL-SCNC: 103 MMOL/L (ref 100–108)
CO2 SERPL-SCNC: 26 MMOL/L (ref 21–32)
CREAT SERPL-MCNC: 1.29 MG/DL (ref 0.6–1.3)
EOSINOPHIL # BLD AUTO: 0.16 THOUSAND/ΜL (ref 0–0.61)
EOSINOPHIL NFR BLD AUTO: 2 % (ref 0–6)
ERYTHROCYTE [DISTWIDTH] IN BLOOD BY AUTOMATED COUNT: 13.2 % (ref 11.6–15.1)
GFR SERPL CREATININE-BSD FRML MDRD: 59 ML/MIN/1.73SQ M
GLUCOSE SERPL-MCNC: 269 MG/DL (ref 65–140)
GLUCOSE SERPL-MCNC: 278 MG/DL (ref 65–140)
GLUCOSE SERPL-MCNC: 317 MG/DL (ref 65–140)
HCT VFR BLD AUTO: 38.4 % (ref 36.5–49.3)
HGB BLD-MCNC: 12.7 G/DL (ref 12–17)
IMM GRANULOCYTES # BLD AUTO: 0.02 THOUSAND/UL (ref 0–0.2)
IMM GRANULOCYTES NFR BLD AUTO: 0 % (ref 0–2)
LIPASE SERPL-CCNC: 33 U/L (ref 73–393)
LYMPHOCYTES # BLD AUTO: 1.19 THOUSANDS/ΜL (ref 0.6–4.47)
LYMPHOCYTES NFR BLD AUTO: 14 % (ref 14–44)
MCH RBC QN AUTO: 29.7 PG (ref 26.8–34.3)
MCHC RBC AUTO-ENTMCNC: 33.1 G/DL (ref 31.4–37.4)
MCV RBC AUTO: 90 FL (ref 82–98)
MONOCYTES # BLD AUTO: 0.71 THOUSAND/ΜL (ref 0.17–1.22)
MONOCYTES NFR BLD AUTO: 9 % (ref 4–12)
NEUTROPHILS # BLD AUTO: 6.17 THOUSANDS/ΜL (ref 1.85–7.62)
NEUTS SEG NFR BLD AUTO: 74 % (ref 43–75)
NRBC BLD AUTO-RTO: 0 /100 WBCS
P AXIS: 13 DEGREES
P AXIS: 60 DEGREES
PLATELET # BLD AUTO: 223 THOUSANDS/UL (ref 149–390)
PMV BLD AUTO: 10.1 FL (ref 8.9–12.7)
POTASSIUM SERPL-SCNC: 3.9 MMOL/L (ref 3.5–5.3)
PR INTERVAL: 138 MS
PR INTERVAL: 146 MS
PROT SERPL-MCNC: 6.9 G/DL (ref 6.4–8.2)
QRS AXIS: 56 DEGREES
QRS AXIS: 65 DEGREES
QRSD INTERVAL: 82 MS
QRSD INTERVAL: 90 MS
QT INTERVAL: 336 MS
QT INTERVAL: 352 MS
QTC INTERVAL: 428 MS
QTC INTERVAL: 431 MS
RBC # BLD AUTO: 4.28 MILLION/UL (ref 3.88–5.62)
SODIUM SERPL-SCNC: 138 MMOL/L (ref 136–145)
T WAVE AXIS: -7 DEGREES
T WAVE AXIS: 27 DEGREES
TROPONIN I SERPL-MCNC: <0.02 NG/ML
TROPONIN I SERPL-MCNC: <0.02 NG/ML
VENTRICULAR RATE: 89 BPM
VENTRICULAR RATE: 99 BPM
WBC # BLD AUTO: 8.3 THOUSAND/UL (ref 4.31–10.16)

## 2019-12-02 PROCEDURE — 96372 THER/PROPH/DIAG INJ SC/IM: CPT

## 2019-12-02 PROCEDURE — 96361 HYDRATE IV INFUSION ADD-ON: CPT

## 2019-12-02 PROCEDURE — 85025 COMPLETE CBC W/AUTO DIFF WBC: CPT | Performed by: EMERGENCY MEDICINE

## 2019-12-02 PROCEDURE — 83690 ASSAY OF LIPASE: CPT | Performed by: EMERGENCY MEDICINE

## 2019-12-02 PROCEDURE — 96374 THER/PROPH/DIAG INJ IV PUSH: CPT

## 2019-12-02 PROCEDURE — 84484 ASSAY OF TROPONIN QUANT: CPT | Performed by: EMERGENCY MEDICINE

## 2019-12-02 PROCEDURE — 99284 EMERGENCY DEPT VISIT MOD MDM: CPT | Performed by: EMERGENCY MEDICINE

## 2019-12-02 PROCEDURE — 82948 REAGENT STRIP/BLOOD GLUCOSE: CPT

## 2019-12-02 PROCEDURE — 93010 ELECTROCARDIOGRAM REPORT: CPT | Performed by: INTERNAL MEDICINE

## 2019-12-02 PROCEDURE — 93005 ELECTROCARDIOGRAM TRACING: CPT

## 2019-12-02 PROCEDURE — 99285 EMERGENCY DEPT VISIT HI MDM: CPT

## 2019-12-02 PROCEDURE — 36415 COLL VENOUS BLD VENIPUNCTURE: CPT | Performed by: EMERGENCY MEDICINE

## 2019-12-02 PROCEDURE — 80053 COMPREHEN METABOLIC PANEL: CPT | Performed by: EMERGENCY MEDICINE

## 2019-12-02 PROCEDURE — 71046 X-RAY EXAM CHEST 2 VIEWS: CPT

## 2019-12-02 RX ORDER — INSULIN ASPART 100 [IU]/ML
INJECTION, SOLUTION INTRAVENOUS; SUBCUTANEOUS
Status: ON HOLD | COMMUNITY
Start: 2019-04-30 | End: 2021-05-10 | Stop reason: SDUPTHER

## 2019-12-02 RX ORDER — SIMVASTATIN 20 MG
20 TABLET ORAL EVERY EVENING
COMMUNITY
Start: 2019-05-17 | End: 2020-05-16

## 2019-12-02 RX ORDER — ONDANSETRON 2 MG/ML
4 INJECTION INTRAMUSCULAR; INTRAVENOUS ONCE
Status: COMPLETED | OUTPATIENT
Start: 2019-12-02 | End: 2019-12-02

## 2019-12-02 RX ORDER — GLUC/MSM/COLGN2/HYAL/ANTIARTH3 375-375-20
1 TABLET ORAL 2 TIMES DAILY
COMMUNITY

## 2019-12-02 RX ORDER — POLYETHYLENE GLYCOL 3350 17 G/17G
17 POWDER, FOR SOLUTION ORAL DAILY
COMMUNITY

## 2019-12-02 RX ADMIN — SODIUM CHLORIDE 1000 ML: 0.9 INJECTION, SOLUTION INTRAVENOUS at 05:07

## 2019-12-02 RX ADMIN — INSULIN LISPRO 10 UNITS: 100 INJECTION, SOLUTION INTRAVENOUS; SUBCUTANEOUS at 12:05

## 2019-12-02 RX ADMIN — ONDANSETRON 4 MG: 2 INJECTION INTRAMUSCULAR; INTRAVENOUS at 05:07

## 2019-12-02 NOTE — ED PROVIDER NOTES
History  Chief Complaint   Patient presents with    Diarrhea     Diarrhea ongoing 4 days  Reports "choking" sensation in chest  Presents from [facility] reporting numerous ill contacts  Patient presents with diarrhea for the past 4 days now progressing to nausea and vomiting with the most recent episode of vomiting just prior to arrival   Patient states that after vomiting he felt a choking sensation in his upper chest and or lower throat  He felt that it was getting tight  Denies any shortness of breath  He is not drooling or feeling any other pain in his chest   He denies any abdominal pain, hematochezia or hematemesis  Patient states that there are lot of people that he lives with at 3 of the local nursing homes that are sick with the same symptoms  History provided by:  Patient   used: No    Diarrhea   Quality:  Watery  Severity:  Moderate  Onset quality:  Gradual  Duration:  4 days  Timing:  Constant  Progression:  Unchanged  Relieved by:  None tried  Worsened by:  Nothing  Ineffective treatments:  None tried  Associated symptoms: vomiting    Associated symptoms: no abdominal pain and no fever    Vomiting:     Severity:  Moderate    Duration:  1 day    Timing:  Constant    Progression:  Worsening  Risk factors: sick contacts        Prior to Admission Medications   Prescriptions Last Dose Informant Patient Reported? Taking?    Calcium Carbonate-Vitamin D 600-200 MG-UNIT CAPS   Yes Yes   Sig: Take 1 tablet by mouth 2 (two) times a day   Dextrose, Diabetic Use, 15 GM/33GM GEL   Yes Yes   Sig: Take 1 packet by mouth   Pramoxine-Calamine (AVEENO ANTI-ITCH EX)   Yes Yes   Sig: Apply topically   Sennosides-Docusate Sodium (SENNA PLUS PO)   Yes Yes   Sig: Take by mouth   insulin aspart (NOVOLOG FLEXPEN) 100 Units/mL injection pen   Yes Yes   Sig: Inject 8 units before breakfast, 6 units before lunch and 8 units before dinner plus sliding scale up to 30 units total daily dose E 10 65   insulin glargine (LANTUS SOLOSTAR) 100 units/mL injection pen   Yes Yes   Sig: Inject 27 Units under the skin daily   polyethylene glycol (MIRALAX) 17 g packet   Yes Yes   Sig: Take 17 g by mouth daily   simvastatin (ZOCOR) 20 mg tablet   Yes Yes   Sig: Take 20 mg by mouth every evening      Facility-Administered Medications: None       History reviewed  No pertinent past medical history  History reviewed  No pertinent surgical history  History reviewed  No pertinent family history  I have reviewed and agree with the history as documented  Social History     Tobacco Use    Smoking status: Never Smoker    Smokeless tobacco: Never Used   Substance Use Topics    Alcohol use: Not Currently    Drug use: Never        Review of Systems   Constitutional: Positive for activity change, appetite change and fatigue  Negative for fever  Respiratory: Positive for choking and chest tightness  Negative for shortness of breath  Cardiovascular: Positive for chest pain  Gastrointestinal: Positive for diarrhea, nausea and vomiting  Negative for abdominal pain  Allergic/Immunologic: Positive for immunocompromised state  Neurological: Positive for dizziness and light-headedness  All other systems reviewed and are negative  Physical Exam  Physical Exam   Constitutional: He is oriented to person, place, and time  He appears well-developed and well-nourished  No distress  HENT:   Head: Normocephalic and atraumatic  Mouth/Throat: Mucous membranes are dry  Eyes: Pupils are equal, round, and reactive to light  Conjunctivae are normal  Right eye exhibits no discharge  Left eye exhibits no discharge  No scleral icterus  Neck: Normal range of motion  Neck supple  Cardiovascular: Regular rhythm, normal heart sounds and intact distal pulses  Tachycardia present  Exam reveals no friction rub  No murmur heard  Pulmonary/Chest: Effort normal and breath sounds normal  No respiratory distress   He has no wheezes  He has no rales  Abdominal: Soft  He exhibits no distension  There is no tenderness  There is no rebound and no guarding  Musculoskeletal: Normal range of motion  He exhibits no edema, tenderness or deformity  Neurological: He is alert and oriented to person, place, and time  Skin: Skin is warm and dry  He is not diaphoretic  There is pallor  Psychiatric: He has a normal mood and affect  Nursing note and vitals reviewed        Vital Signs  ED Triage Vitals [12/02/19 0432]   Temperature Pulse Respirations Blood Pressure SpO2   97 8 °F (36 6 °C) 100 20 105/66 96 %      Temp Source Heart Rate Source Patient Position - Orthostatic VS BP Location FiO2 (%)   Oral Monitor Lying Right arm --      Pain Score       7           Vitals:    12/02/19 0432 12/02/19 0530   BP: 105/66 139/73   Pulse: 100 92   Patient Position - Orthostatic VS: Lying Lying         Visual Acuity      ED Medications  Medications   sodium chloride 0 9 % bolus 1,000 mL (0 mL Intravenous Stopped 12/2/19 0607)   ondansetron (ZOFRAN) injection 4 mg (4 mg Intravenous Given 12/2/19 0507)       Diagnostic Studies  Results Reviewed     Procedure Component Value Units Date/Time    Troponin I [[id number]]  (Normal) Collected:  12/02/19 0507    Lab Status:  Final result Specimen:  Blood from Arm, Left Updated:  12/02/19 0532     Troponin I <0 02 ng/mL     Comprehensive metabolic panel [[id number]]  (Abnormal) Collected:  12/02/19 0507    Lab Status:  Final result Specimen:  Blood from Arm, Left Updated:  12/02/19 0527     Sodium 138 mmol/L      Potassium 3 9 mmol/L      Chloride 103 mmol/L      CO2 26 mmol/L      ANION GAP 9 mmol/L      BUN 20 mg/dL      Creatinine 1 29 mg/dL      Glucose 278 mg/dL      Calcium 8 8 mg/dL      AST 17 U/L      ALT 21 U/L      Alkaline Phosphatase 90 U/L      Total Protein 6 9 g/dL      Albumin 3 4 g/dL      Total Bilirubin 0 71 mg/dL      eGFR 59 ml/min/1 73sq m     Narrative:       National Kidney Disease Foundation guidelines for Chronic Kidney Disease (CKD):     Stage 1 with normal or high GFR (GFR > 90 mL/min/1 73 square meters)    Stage 2 Mild CKD (GFR = 60-89 mL/min/1 73 square meters)    Stage 3A Moderate CKD (GFR = 45-59 mL/min/1 73 square meters)    Stage 3B Moderate CKD (GFR = 30-44 mL/min/1 73 square meters)    Stage 4 Severe CKD (GFR = 15-29 mL/min/1 73 square meters)    Stage 5 End Stage CKD (GFR <15 mL/min/1 73 square meters)  Note: GFR calculation is accurate only with a steady state creatinine    Lipase [[id number]]  (Abnormal) Collected:  12/02/19 0507    Lab Status:  Final result Specimen:  Blood from Arm, Left Updated:  12/02/19 0527     Lipase 33 u/L     CBC and differential [[id number]] Collected:  12/02/19 0507    Lab Status:  Final result Specimen:  Blood from Arm, Left Updated:  12/02/19 0512     WBC 8 30 Thousand/uL      RBC 4 28 Million/uL      Hemoglobin 12 7 g/dL      Hematocrit 38 4 %      MCV 90 fL      MCH 29 7 pg      MCHC 33 1 g/dL      RDW 13 2 %      MPV 10 1 fL      Platelets 069 Thousands/uL      nRBC 0 /100 WBCs      Neutrophils Relative 74 %      Immat GRANS % 0 %      Lymphocytes Relative 14 %      Monocytes Relative 9 %      Eosinophils Relative 2 %      Basophils Relative 1 %      Neutrophils Absolute 6 17 Thousands/µL      Immature Grans Absolute 0 02 Thousand/uL      Lymphocytes Absolute 1 19 Thousands/µL      Monocytes Absolute 0 71 Thousand/µL      Eosinophils Absolute 0 16 Thousand/µL      Basophils Absolute 0 05 Thousands/µL     Fingerstick Glucose (POCT) [[id number]]  (Abnormal) Collected:  12/02/19 0511    Lab Status:  Final result Updated:  12/02/19 0512     POC Glucose 269 mg/dl                  XR chest 2 views   ED Interpretation by Alexandra iYp DO (12/02 2231)   NAD                 Procedures  ECG 12 Lead Documentation Only  Date/Time: 12/2/2019 5:47 AM  Performed by: Alexandra Yip DO  Authorized by: Alexandra Yip DO     Indications / Diagnosis:  Chest pain  ECG reviewed by me, the ED Provider: yes    Patient location:  ED  Previous ECG:     Previous ECG:  Compared to current    Similarity:  No change  Interpretation:     Interpretation: normal    Rate:     ECG rate:  99    ECG rate assessment: normal    Rhythm:     Rhythm: sinus rhythm    Ectopy:     Ectopy: none    QRS:     QRS intervals:  Normal  Conduction:     Conduction: normal    ST segments:     ST segments:  Normal  T waves:     T waves: normal             ED Course  ED Course as of Dec 02 0638   Mon Dec 02, 2019   0614 XR chest 2 views                               MDM  Number of Diagnoses or Management Options  Chest pain: new and requires workup  Dehydration: new and requires workup  Nausea vomiting and diarrhea: new and requires workup  Diagnosis management comments: Patient presents with signs and symptoms of an acute GI illness  Patient vomited tonight and started to feel some tightness in his throat  He is in no respiratory distress, no stridor or hypoxia  He does not provide history of aspiration  Patient is tachycardic and appears dehydrated on exam   Will start with IV fluids, Zofran, labs and reassess  5:55 AM  Patient doing well  Labs are overall normal with a mild hyperglycemia  Plan is to continue to observe, delta troponin at 9:00 a m  And EKG  If any symptoms of persistent nausea and vomiting developed an are intractable he will need to be admitted at that point but thus far patient has not vomited         Amount and/or Complexity of Data Reviewed  Clinical lab tests: ordered and reviewed  Tests in the radiology section of CPT®: ordered and reviewed  Tests in the medicine section of CPT®: ordered and reviewed  Review and summarize past medical records: yes  Independent visualization of images, tracings, or specimens: yes    Patient Progress  Patient progress: stable      Disposition  Final diagnoses:   Nausea vomiting and diarrhea   Dehydration   Chest pain Time reflects when diagnosis was documented in both MDM as applicable and the Disposition within this note     Time User Action Codes Description Comment    12/2/2019  5:19 AM Smeriglio, Valla Hussar Add [R11 2,  R19 7] Nausea vomiting and diarrhea     12/2/2019  5:19 AM Smeriglio, Valla Hussar Add [E86 0] Dehydration     12/2/2019  5:59 AM Smeriglio, Valla Hussar Add [R07 9] Chest pain       ED Disposition     None      Follow-up Information    None         Patient's Medications   Discharge Prescriptions    No medications on file     No discharge procedures on file      ED Provider  Electronically Signed by           Jluis Wade DO  12/02/19 7916

## 2019-12-02 NOTE — ED NOTES
Patient is dressed at this time and ready to go home when his sister arrives       Chase Oleary RN  12/02/19 9143

## 2019-12-02 NOTE — ED NOTES
RN spoke with the patient's sister via the telephone  She states that she is heading over to the ED to transport the patient back to his nursing facility  [facility] was notified       Rina Regalado RN  12/02/19 5371

## 2019-12-02 NOTE — ED NOTES
Patient transported to [facility], [address] [facility]  12/02/19 4674

## 2020-12-26 DIAGNOSIS — Z01.89 LABORATORY TEST: ICD-10-CM

## 2020-12-26 PROCEDURE — U0003 INFECTIOUS AGENT DETECTION BY NUCLEIC ACID (DNA OR RNA); SEVERE ACUTE RESPIRATORY SYNDROME CORONAVIRUS 2 (SARS-COV-2) (CORONAVIRUS DISEASE [COVID-19]), AMPLIFIED PROBE TECHNIQUE, MAKING USE OF HIGH THROUGHPUT TECHNOLOGIES AS DESCRIBED BY CMS-2020-01-R: HCPCS | Performed by: OPHTHALMOLOGY

## 2020-12-28 DIAGNOSIS — Z01.89 LABORATORY TEST: ICD-10-CM

## 2020-12-28 LAB — SARS-COV-2 RNA SPEC QL NAA+PROBE: DETECTED

## 2020-12-28 PROCEDURE — U0003 INFECTIOUS AGENT DETECTION BY NUCLEIC ACID (DNA OR RNA); SEVERE ACUTE RESPIRATORY SYNDROME CORONAVIRUS 2 (SARS-COV-2) (CORONAVIRUS DISEASE [COVID-19]), AMPLIFIED PROBE TECHNIQUE, MAKING USE OF HIGH THROUGHPUT TECHNOLOGIES AS DESCRIBED BY CMS-2020-01-R: HCPCS | Performed by: OPHTHALMOLOGY

## 2020-12-29 LAB — SARS-COV-2 RNA SPEC QL NAA+PROBE: NOT DETECTED

## 2021-01-12 ENCOUNTER — NURSING HOME VISIT (OUTPATIENT)
Dept: GERIATRICS | Facility: OTHER | Age: 66
End: 2021-01-12
Payer: COMMERCIAL

## 2021-01-12 DIAGNOSIS — F32.9 REACTIVE DEPRESSION: Primary | ICD-10-CM

## 2021-01-12 DIAGNOSIS — F41.9 ANXIETY: ICD-10-CM

## 2021-01-12 PROBLEM — I95.1 ORTHOSTATIC HYPOTENSION: Status: ACTIVE | Noted: 2019-12-16

## 2021-01-12 PROBLEM — R00.0 SINUS TACHYCARDIA: Status: ACTIVE | Noted: 2019-12-13

## 2021-01-12 PROBLEM — E11.69 HYPERLIPIDEMIA ASSOCIATED WITH TYPE 2 DIABETES MELLITUS (HCC): Status: ACTIVE | Noted: 2020-01-17

## 2021-01-12 PROBLEM — E78.5 HYPERLIPIDEMIA ASSOCIATED WITH TYPE 2 DIABETES MELLITUS (HCC): Status: ACTIVE | Noted: 2020-01-17

## 2021-01-12 PROBLEM — R26.2 AMBULATORY DYSFUNCTION: Status: ACTIVE | Noted: 2019-12-23

## 2021-01-12 PROBLEM — H53.8 BLURRY VISION, RIGHT EYE: Status: ACTIVE | Noted: 2020-12-10

## 2021-01-12 PROBLEM — E11.10 DKA (DIABETIC KETOACIDOSES): Status: ACTIVE | Noted: 2019-12-13

## 2021-01-12 PROBLEM — C96.6 LANGERHAN'S CELL HISTIOCYTOSIS (HCC): Status: ACTIVE | Noted: 2019-12-13

## 2021-01-12 PROCEDURE — 99326 PR DOMICIL/REST HOME NEW PT HI-MOD SEVER 45 MINUTES: CPT | Performed by: NURSE PRACTITIONER

## 2021-01-12 RX ORDER — MIDODRINE HYDROCHLORIDE 5 MG/1
5 TABLET ORAL 3 TIMES DAILY
COMMUNITY
Start: 2021-01-08

## 2021-01-12 NOTE — PROGRESS NOTES
[address] [place]  [facility] [facility]   POS: 13: [address],  [facility] [address] EVALUATION     NAME: Bart Alvarez  AGE: 60 y o  SEX: male [id number]    DATE OF ENCOUNTER: 1/12/2021    Code status:  full code    Assessment and Plan      Diagnosis ICD-10-CM Associated Orders   1  Reactive depression  F32 9    2  Anxiety  F41 9        All medications and routine orders were reviewed and updated as needed  Evaluation of Psychotropic Drugs for possible gradual dose reductions    Psychotropic medications have been reviewed  Patient continues with symptoms of anxiety and depression as noted below  Any dose reductions at this time would be clinically contraindicated, as it would be likely to cause worsening of symptoms  Plan discussed with: Patient    Treatment Recommendations/Precautions:    No new medications    Medication management every 1 month    Medications Risks/Benefits:      Risks, Benefits And Possible Side Effects Of Medications:    Risks, benefits, and possible side effects of medications explained to Pretty Ledesma and he verbalizes understanding and agreement for treatment  Controlled Medication Discussion:     Not applicable - controlled prescriptions are not prescribed by this practice    Chief Complaint     Seen for Psychiatric Consult  at Nursing Facility/Assisted Living    History of Present Illness     Pretty Ledesma is a 59 yo male with no known previous psychiatric history  Patient has been a resident of [facility] since [facility], he states due to complications of his diabetes - he was unable to manage his diabetes independently  He has had recent issues with low blood pressure and also recent eye surgery for retinopathy  He states his medical conditions cause his worry/distress, and recently told staff he felt depressed due to not being able to ambulate to the dining room for meals due to low blood pressure    He denies any loss of interest or motivation  Reports sleep and appetite are unchanged  Denies any suicidal thoughts or passive death wish  Somewhat perseverative and perhaps some somatization  Discussed SSRI to help with anxiety and depression  However, he does not feel he needs medications, and feels he will be better once blood pressures improve  Will continue medications the same for now and continue to monitor  Anxiety  Presents for follow-up visit  Symptoms include excessive worry, nervous/anxious behavior and obsessions  Symptoms occur most days  The severity of symptoms is mild  The quality of sleep is fair  Nighttime awakenings: occasional      Compliance with medications is %  He  reports fluctuating sleep pattern, fluctuating appetite, fluctuating energy levels    The following portions of the patient's history were reviewed and updated as appropriate: allergies, current medications, past family history, past medical history, past social history, past surgical history and problem list     Past Psychiatric History:     Past Inpatient Psychiatric Treatment:   No history of past inpatient psychiatric admissions  Past Outpatient Psychiatric Treatment:    No history of past outpatient psychiatric treatment  Past Suicide Attempts: no  Past Violent Behavior: no  Past Psychiatric Medication Trials: none    Traumatic History:     Abuse: none  Other Traumatic Events: none    HISTORY:  No past medical history on file  No family history on file    Social History     Socioeconomic History    Marital status: Single     Spouse name: Not on file    Number of children: Not on file    Years of education: Not on file    Highest education level: Not on file   Occupational History    Not on file   Social Needs    Financial resource strain: Not on file    Food insecurity     Worry: Not on file     Inability: Not on file    Transportation needs     Medical: Not on file     Non-medical: Not on file   Tobacco Use    Smoking status: Never Smoker    Smokeless tobacco: Never Used   Substance and Sexual Activity    Alcohol use: Not Currently    Drug use: Never    Sexual activity: Not on file   Lifestyle    Physical activity     Days per week: Not on file     Minutes per session: Not on file    Stress: Not on file   Relationships    Social connections     Talks on phone: Not on file     Gets together: Not on file     Attends Sabianist service: Not on file     Active member of club or organization: Not on file     Attends meetings of clubs or organizations: Not on file     Relationship status: Not on file    Intimate partner violence     Fear of current or ex partner: Not on file     Emotionally abused: Not on file     Physically abused: Not on file     Forced sexual activity: Not on file   Other Topics Concern    Not on file   Social History Narrative    Not on file       Allergies:  No Known Allergies    Review of Systems     Review of Systems   Psychiatric/Behavioral: Positive for dysphoric mood  The patient is nervous/anxious  All other systems reviewed and are negative        Medications and orders       Current Outpatient Medications:     midodrine (PROAMATINE) 5 mg tablet, Take 5 mg by mouth 3 (three) times a day, Disp: , Rfl:     Calcium Carbonate-Vitamin D 600-200 MG-UNIT CAPS, Take 1 tablet by mouth 2 (two) times a day, Disp: , Rfl:     Dextrose, Diabetic Use, 15 GM/33GM GEL, Take 1 packet by mouth, Disp: , Rfl:     insulin aspart (NOVOLOG FLEXPEN) 100 Units/mL injection pen, , Disp: , Rfl:     insulin glargine (LANTUS SOLOSTAR) 100 units/mL injection pen, Inject 27 Units under the skin daily, Disp: , Rfl:     polyethylene glycol (MIRALAX) 17 g packet, Take 17 g by mouth daily, Disp: , Rfl:     Pramoxine-Calamine (AVEENO ANTI-ITCH EX), Apply topically, Disp: , Rfl:     Sennosides-Docusate Sodium (SENNA PLUS PO), Take by mouth, Disp: , Rfl:     simvastatin (ZOCOR) 20 mg tablet, Take 20 mg by mouth every evening, Disp: , Rfl:       Objective     Mental Status Evaluation:    Appearance age appropriate   Behavior cooperative, calm   Speech soft   Mood depressed, anxious   Affect flat   Thought Processes perseverative   Associations intact associations   Thought Content no overt delusions, ruminating thoughts   Perceptual Disturbances: no auditory hallucinations, no visual hallucinations   Abnormal Thoughts  Risk Potential Suicidal ideation - None  Homicidal ideation - None  Potential for aggression - No   Orientation oriented to person, place and time/date   Memory recent and remote memory grossly intact   Consciousness alert and awake   Attention Span Concentration Span attention span and concentration are age appropriate   Intellect appears to be of average intelligence   Insight limited   Judgement intact   Muscle Strength and  Gait uses cane   Motor Activity no abnormal movements   Language no difficulty naming common objects, no difficulty repeating a phrase, no difficulty writing a sentence   Fund of Knowledge adequate knowledge of current events  adequate fund of knowledge regarding past history  adequate fund of knowledge regarding vocabulary    Pain none   Pain Scale 0       Physical Exam  Vitals signs and nursing note reviewed  Psychiatric:         Mood and Affect: Mood is anxious  Affect is flat  Speech: Speech is delayed  Behavior: Behavior is withdrawn  Pertinent Laboratory/Diagnostic Studies:   I have personally reviewed pertinent lab results  Counseling / Coordination of Care  Total floor / unit time spent today 45 minutes  Greater than 50% of total time was spent with the patient and / or family counseling and / or coordination of care       TAM Brown 01/12/21

## 2021-04-29 ENCOUNTER — HOSPITAL ENCOUNTER (INPATIENT)
Facility: HOSPITAL | Age: 66
LOS: 9 days | Discharge: HOME | DRG: 639 | End: 2021-05-10
Attending: EMERGENCY MEDICINE | Admitting: HOSPITALIST
Payer: COMMERCIAL

## 2021-04-29 DIAGNOSIS — R55 SYNCOPE: Primary | ICD-10-CM

## 2021-04-29 DIAGNOSIS — E16.2 HYPOGLYCEMIA: ICD-10-CM

## 2021-04-29 DIAGNOSIS — IMO0002: ICD-10-CM

## 2021-04-29 LAB
ANION GAP SERPL CALCULATED.3IONS-SCNC: 9 MMOL/L (ref 4–13)
BASOPHILS # BLD AUTO: 0.01 THOUSANDS/ΜL (ref 0–0.1)
BASOPHILS NFR BLD AUTO: 0 % (ref 0–1)
BUN SERPL-MCNC: 19 MG/DL (ref 5–25)
CALCIUM SERPL-MCNC: 9.5 MG/DL (ref 8.3–10.1)
CHLORIDE SERPL-SCNC: 105 MMOL/L (ref 100–108)
CO2 SERPL-SCNC: 31 MMOL/L (ref 21–32)
CREAT SERPL-MCNC: 1.12 MG/DL (ref 0.6–1.3)
EOSINOPHIL # BLD AUTO: 0.16 THOUSAND/ΜL (ref 0–0.61)
EOSINOPHIL NFR BLD AUTO: 4 % (ref 0–6)
ERYTHROCYTE [DISTWIDTH] IN BLOOD BY AUTOMATED COUNT: 13 % (ref 11.6–15.1)
GFR SERPL CREATININE-BSD FRML MDRD: 69 ML/MIN/1.73SQ M
GLUCOSE SERPL-MCNC: 89 MG/DL (ref 65–140)
GLUCOSE SERPL-MCNC: 91 MG/DL (ref 65–140)
HCT VFR BLD AUTO: 36.3 % (ref 36.5–49.3)
HGB BLD-MCNC: 12.1 G/DL (ref 12–17)
IMM GRANULOCYTES # BLD AUTO: 0.02 THOUSAND/UL (ref 0–0.2)
IMM GRANULOCYTES NFR BLD AUTO: 0 % (ref 0–2)
LYMPHOCYTES # BLD AUTO: 0.89 THOUSANDS/ΜL (ref 0.6–4.47)
LYMPHOCYTES NFR BLD AUTO: 20 % (ref 14–44)
MCH RBC QN AUTO: 30.6 PG (ref 26.8–34.3)
MCHC RBC AUTO-ENTMCNC: 33.3 G/DL (ref 31.4–37.4)
MCV RBC AUTO: 92 FL (ref 82–98)
MONOCYTES # BLD AUTO: 0.43 THOUSAND/ΜL (ref 0.17–1.22)
MONOCYTES NFR BLD AUTO: 10 % (ref 4–12)
NEUTROPHILS # BLD AUTO: 3.01 THOUSANDS/ΜL (ref 1.85–7.62)
NEUTS SEG NFR BLD AUTO: 66 % (ref 43–75)
NRBC BLD AUTO-RTO: 0 /100 WBCS
PLATELET # BLD AUTO: 165 THOUSANDS/UL (ref 149–390)
PMV BLD AUTO: 9.9 FL (ref 8.9–12.7)
POTASSIUM SERPL-SCNC: 3.4 MMOL/L (ref 3.5–5.3)
RBC # BLD AUTO: 3.96 MILLION/UL (ref 3.88–5.62)
SODIUM SERPL-SCNC: 145 MMOL/L (ref 136–145)
TROPONIN I SERPL-MCNC: <0.02 NG/ML
WBC # BLD AUTO: 4.52 THOUSAND/UL (ref 4.31–10.16)

## 2021-04-29 PROCEDURE — 80048 BASIC METABOLIC PNL TOTAL CA: CPT | Performed by: EMERGENCY MEDICINE

## 2021-04-29 PROCEDURE — 93005 ELECTROCARDIOGRAM TRACING: CPT

## 2021-04-29 PROCEDURE — 84484 ASSAY OF TROPONIN QUANT: CPT | Performed by: EMERGENCY MEDICINE

## 2021-04-29 PROCEDURE — 99285 EMERGENCY DEPT VISIT HI MDM: CPT

## 2021-04-29 PROCEDURE — 36415 COLL VENOUS BLD VENIPUNCTURE: CPT | Performed by: EMERGENCY MEDICINE

## 2021-04-29 PROCEDURE — 99291 CRITICAL CARE FIRST HOUR: CPT | Performed by: EMERGENCY MEDICINE

## 2021-04-29 PROCEDURE — 85025 COMPLETE CBC W/AUTO DIFF WBC: CPT | Performed by: EMERGENCY MEDICINE

## 2021-04-29 PROCEDURE — 82948 REAGENT STRIP/BLOOD GLUCOSE: CPT

## 2021-04-30 ENCOUNTER — APPOINTMENT (EMERGENCY)
Dept: CT IMAGING | Facility: HOSPITAL | Age: 66
DRG: 639 | End: 2021-04-30
Payer: COMMERCIAL

## 2021-04-30 PROBLEM — R55 SYNCOPE AND COLLAPSE: Status: ACTIVE | Noted: 2021-04-30

## 2021-04-30 PROBLEM — E11.649 HYPOGLYCEMIA ASSOCIATED WITH DIABETES (HCC): Status: ACTIVE | Noted: 2021-04-30

## 2021-04-30 LAB
ATRIAL RATE: 83 BPM
GLUCOSE SERPL-MCNC: 162 MG/DL (ref 65–140)
GLUCOSE SERPL-MCNC: 203 MG/DL (ref 65–140)
GLUCOSE SERPL-MCNC: 330 MG/DL (ref 65–140)
GLUCOSE SERPL-MCNC: 377 MG/DL (ref 65–140)
GLUCOSE SERPL-MCNC: 459 MG/DL (ref 65–140)
GLUCOSE SERPL-MCNC: 66 MG/DL (ref 65–140)
P AXIS: 60 DEGREES
PR INTERVAL: 160 MS
QRS AXIS: 22 DEGREES
QRSD INTERVAL: 80 MS
QT INTERVAL: 370 MS
QTC INTERVAL: 434 MS
T WAVE AXIS: 54 DEGREES
VENTRICULAR RATE: 83 BPM

## 2021-04-30 PROCEDURE — 70450 CT HEAD/BRAIN W/O DYE: CPT

## 2021-04-30 PROCEDURE — 82948 REAGENT STRIP/BLOOD GLUCOSE: CPT

## 2021-04-30 PROCEDURE — 93010 ELECTROCARDIOGRAM REPORT: CPT | Performed by: INTERNAL MEDICINE

## 2021-04-30 PROCEDURE — 97163 PT EVAL HIGH COMPLEX 45 MIN: CPT

## 2021-04-30 PROCEDURE — 97166 OT EVAL MOD COMPLEX 45 MIN: CPT

## 2021-04-30 PROCEDURE — 96374 THER/PROPH/DIAG INJ IV PUSH: CPT

## 2021-04-30 PROCEDURE — 99220 PR INITIAL OBSERVATION CARE/DAY 70 MINUTES: CPT | Performed by: INTERNAL MEDICINE

## 2021-04-30 RX ORDER — ACETAMINOPHEN 325 MG/1
650 TABLET ORAL EVERY 6 HOURS PRN
Status: DISCONTINUED | OUTPATIENT
Start: 2021-04-30 | End: 2021-05-10 | Stop reason: HOSPADM

## 2021-04-30 RX ORDER — DEXTROSE MONOHYDRATE 25 G/50ML
25 INJECTION, SOLUTION INTRAVENOUS ONCE
Status: COMPLETED | OUTPATIENT
Start: 2021-04-30 | End: 2021-04-30

## 2021-04-30 RX ORDER — POTASSIUM CHLORIDE 20 MEQ/1
20 TABLET, EXTENDED RELEASE ORAL ONCE
Status: COMPLETED | OUTPATIENT
Start: 2021-04-30 | End: 2021-04-30

## 2021-04-30 RX ORDER — ONDANSETRON 2 MG/ML
4 INJECTION INTRAMUSCULAR; INTRAVENOUS ONCE
Status: COMPLETED | OUTPATIENT
Start: 2021-04-30 | End: 2021-04-30

## 2021-04-30 RX ORDER — METOCLOPRAMIDE HYDROCHLORIDE 5 MG/ML
10 INJECTION INTRAMUSCULAR; INTRAVENOUS EVERY 6 HOURS PRN
Status: DISCONTINUED | OUTPATIENT
Start: 2021-04-30 | End: 2021-05-10 | Stop reason: HOSPADM

## 2021-04-30 RX ORDER — INSULIN GLARGINE 100 [IU]/ML
10 INJECTION, SOLUTION SUBCUTANEOUS
Status: DISCONTINUED | OUTPATIENT
Start: 2021-04-30 | End: 2021-05-01

## 2021-04-30 RX ORDER — POLYETHYLENE GLYCOL 3350 17 G/17G
17 POWDER, FOR SOLUTION ORAL DAILY PRN
Status: DISCONTINUED | OUTPATIENT
Start: 2021-04-30 | End: 2021-05-10 | Stop reason: HOSPADM

## 2021-04-30 RX ORDER — MIDODRINE HYDROCHLORIDE 5 MG/1
5 TABLET ORAL 3 TIMES DAILY
Status: DISCONTINUED | OUTPATIENT
Start: 2021-04-30 | End: 2021-05-10 | Stop reason: HOSPADM

## 2021-04-30 RX ORDER — ONDANSETRON 2 MG/ML
4 INJECTION INTRAMUSCULAR; INTRAVENOUS EVERY 6 HOURS PRN
Status: DISCONTINUED | OUTPATIENT
Start: 2021-04-30 | End: 2021-05-10 | Stop reason: HOSPADM

## 2021-04-30 RX ORDER — DEXTROSE AND SODIUM CHLORIDE 5; .45 G/100ML; G/100ML
50 INJECTION, SOLUTION INTRAVENOUS CONTINUOUS
Status: DISCONTINUED | OUTPATIENT
Start: 2021-04-30 | End: 2021-04-30

## 2021-04-30 RX ADMIN — DEXTROSE MONOHYDRATE 25 ML: 25 INJECTION, SOLUTION INTRAVENOUS at 04:01

## 2021-04-30 RX ADMIN — ENOXAPARIN SODIUM 40 MG: 40 INJECTION SUBCUTANEOUS at 09:36

## 2021-04-30 RX ADMIN — ONDANSETRON 4 MG: 2 INJECTION INTRAMUSCULAR; INTRAVENOUS at 02:43

## 2021-04-30 RX ADMIN — INSULIN LISPRO 5 UNITS: 100 INJECTION, SOLUTION INTRAVENOUS; SUBCUTANEOUS at 17:39

## 2021-04-30 RX ADMIN — INSULIN LISPRO 5 UNITS: 100 INJECTION, SOLUTION INTRAVENOUS; SUBCUTANEOUS at 17:56

## 2021-04-30 RX ADMIN — INSULIN GLARGINE 10 UNITS: 100 INJECTION, SOLUTION SUBCUTANEOUS at 17:56

## 2021-04-30 RX ADMIN — DEXTROSE AND SODIUM CHLORIDE 50 ML/HR: 5; .45 INJECTION, SOLUTION INTRAVENOUS at 04:39

## 2021-04-30 RX ADMIN — INSULIN LISPRO 3 UNITS: 100 INJECTION, SOLUTION INTRAVENOUS; SUBCUTANEOUS at 13:40

## 2021-04-30 RX ADMIN — MIDODRINE HYDROCHLORIDE 5 MG: 5 TABLET ORAL at 09:36

## 2021-04-30 RX ADMIN — MIDODRINE HYDROCHLORIDE 5 MG: 5 TABLET ORAL at 21:13

## 2021-04-30 RX ADMIN — POTASSIUM CHLORIDE 20 MEQ: 1500 TABLET, EXTENDED RELEASE ORAL at 09:36

## 2021-04-30 RX ADMIN — MIDODRINE HYDROCHLORIDE 5 MG: 5 TABLET ORAL at 17:38

## 2021-05-01 LAB
ANION GAP SERPL CALCULATED.3IONS-SCNC: 8 MMOL/L (ref 4–13)
BASOPHILS # BLD AUTO: 0.04 THOUSANDS/ΜL (ref 0–0.1)
BASOPHILS NFR BLD AUTO: 0 % (ref 0–1)
BUN SERPL-MCNC: 21 MG/DL (ref 5–25)
CALCIUM SERPL-MCNC: 8.6 MG/DL (ref 8.3–10.1)
CHLORIDE SERPL-SCNC: 103 MMOL/L (ref 100–108)
CO2 SERPL-SCNC: 27 MMOL/L (ref 21–32)
CREAT SERPL-MCNC: 1.16 MG/DL (ref 0.6–1.3)
EOSINOPHIL # BLD AUTO: 0.23 THOUSAND/ΜL (ref 0–0.61)
EOSINOPHIL NFR BLD AUTO: 2 % (ref 0–6)
ERYTHROCYTE [DISTWIDTH] IN BLOOD BY AUTOMATED COUNT: 12.9 % (ref 11.6–15.1)
GFR SERPL CREATININE-BSD FRML MDRD: 66 ML/MIN/1.73SQ M
GLUCOSE P FAST SERPL-MCNC: 252 MG/DL (ref 65–99)
GLUCOSE SERPL-MCNC: 210 MG/DL (ref 65–140)
GLUCOSE SERPL-MCNC: 239 MG/DL (ref 65–140)
GLUCOSE SERPL-MCNC: 252 MG/DL (ref 65–140)
GLUCOSE SERPL-MCNC: 280 MG/DL (ref 65–140)
GLUCOSE SERPL-MCNC: 338 MG/DL (ref 65–140)
GLUCOSE SERPL-MCNC: 423 MG/DL (ref 65–140)
GLUCOSE SERPL-MCNC: 477 MG/DL (ref 65–140)
HCT VFR BLD AUTO: 35.8 % (ref 36.5–49.3)
HGB BLD-MCNC: 11.9 G/DL (ref 12–17)
IMM GRANULOCYTES # BLD AUTO: 0.07 THOUSAND/UL (ref 0–0.2)
IMM GRANULOCYTES NFR BLD AUTO: 1 % (ref 0–2)
LYMPHOCYTES # BLD AUTO: 0.95 THOUSANDS/ΜL (ref 0.6–4.47)
LYMPHOCYTES NFR BLD AUTO: 10 % (ref 14–44)
MCH RBC QN AUTO: 30.2 PG (ref 26.8–34.3)
MCHC RBC AUTO-ENTMCNC: 33.2 G/DL (ref 31.4–37.4)
MCV RBC AUTO: 91 FL (ref 82–98)
MONOCYTES # BLD AUTO: 0.66 THOUSAND/ΜL (ref 0.17–1.22)
MONOCYTES NFR BLD AUTO: 7 % (ref 4–12)
NEUTROPHILS # BLD AUTO: 7.85 THOUSANDS/ΜL (ref 1.85–7.62)
NEUTS SEG NFR BLD AUTO: 80 % (ref 43–75)
NRBC BLD AUTO-RTO: 0 /100 WBCS
PLATELET # BLD AUTO: 152 THOUSANDS/UL (ref 149–390)
PMV BLD AUTO: 10.1 FL (ref 8.9–12.7)
POTASSIUM SERPL-SCNC: 3.9 MMOL/L (ref 3.5–5.3)
RBC # BLD AUTO: 3.94 MILLION/UL (ref 3.88–5.62)
SODIUM SERPL-SCNC: 138 MMOL/L (ref 136–145)
WBC # BLD AUTO: 9.8 THOUSAND/UL (ref 4.31–10.16)

## 2021-05-01 PROCEDURE — 85025 COMPLETE CBC W/AUTO DIFF WBC: CPT | Performed by: NURSE PRACTITIONER

## 2021-05-01 PROCEDURE — 99225 PR SBSQ OBSERVATION CARE/DAY 25 MINUTES: CPT | Performed by: INTERNAL MEDICINE

## 2021-05-01 PROCEDURE — 80048 BASIC METABOLIC PNL TOTAL CA: CPT | Performed by: NURSE PRACTITIONER

## 2021-05-01 PROCEDURE — 82948 REAGENT STRIP/BLOOD GLUCOSE: CPT

## 2021-05-01 RX ORDER — INSULIN GLARGINE 100 [IU]/ML
10 INJECTION, SOLUTION SUBCUTANEOUS ONCE
Status: COMPLETED | OUTPATIENT
Start: 2021-05-01 | End: 2021-05-01

## 2021-05-01 RX ORDER — INSULIN GLARGINE 100 [IU]/ML
15 INJECTION, SOLUTION SUBCUTANEOUS
Status: COMPLETED | OUTPATIENT
Start: 2021-05-01 | End: 2021-05-01

## 2021-05-01 RX ORDER — INSULIN GLARGINE 100 [IU]/ML
25 INJECTION, SOLUTION SUBCUTANEOUS
Status: DISCONTINUED | OUTPATIENT
Start: 2021-05-02 | End: 2021-05-02

## 2021-05-01 RX ADMIN — INSULIN LISPRO 4 UNITS: 100 INJECTION, SOLUTION INTRAVENOUS; SUBCUTANEOUS at 11:32

## 2021-05-01 RX ADMIN — INSULIN GLARGINE 10 UNITS: 100 INJECTION, SOLUTION SUBCUTANEOUS at 11:32

## 2021-05-01 RX ADMIN — ENOXAPARIN SODIUM 40 MG: 40 INJECTION SUBCUTANEOUS at 08:48

## 2021-05-01 RX ADMIN — INSULIN LISPRO 6 UNITS: 100 INJECTION, SOLUTION INTRAVENOUS; SUBCUTANEOUS at 16:37

## 2021-05-01 RX ADMIN — SODIUM CHLORIDE 500 ML: 0.9 INJECTION, SOLUTION INTRAVENOUS at 11:33

## 2021-05-01 RX ADMIN — INSULIN GLARGINE 15 UNITS: 100 INJECTION, SOLUTION SUBCUTANEOUS at 21:18

## 2021-05-01 RX ADMIN — INSULIN LISPRO 2 UNITS: 100 INJECTION, SOLUTION INTRAVENOUS; SUBCUTANEOUS at 07:42

## 2021-05-01 RX ADMIN — MIDODRINE HYDROCHLORIDE 5 MG: 5 TABLET ORAL at 16:37

## 2021-05-01 RX ADMIN — MIDODRINE HYDROCHLORIDE 5 MG: 5 TABLET ORAL at 08:48

## 2021-05-01 RX ADMIN — MIDODRINE HYDROCHLORIDE 5 MG: 5 TABLET ORAL at 21:17

## 2021-05-02 LAB
ANION GAP SERPL CALCULATED.3IONS-SCNC: 7 MMOL/L (ref 4–13)
BUN SERPL-MCNC: 16 MG/DL (ref 5–25)
CALCIUM SERPL-MCNC: 8.3 MG/DL (ref 8.3–10.1)
CHLORIDE SERPL-SCNC: 103 MMOL/L (ref 100–108)
CO2 SERPL-SCNC: 29 MMOL/L (ref 21–32)
CREAT SERPL-MCNC: 1.1 MG/DL (ref 0.6–1.3)
ERYTHROCYTE [DISTWIDTH] IN BLOOD BY AUTOMATED COUNT: 12.8 % (ref 11.6–15.1)
GFR SERPL CREATININE-BSD FRML MDRD: 70 ML/MIN/1.73SQ M
GLUCOSE SERPL-MCNC: 248 MG/DL (ref 65–140)
GLUCOSE SERPL-MCNC: 264 MG/DL (ref 65–140)
GLUCOSE SERPL-MCNC: 310 MG/DL (ref 65–140)
GLUCOSE SERPL-MCNC: 314 MG/DL (ref 65–140)
GLUCOSE SERPL-MCNC: 394 MG/DL (ref 65–140)
HCT VFR BLD AUTO: 35 % (ref 36.5–49.3)
HGB BLD-MCNC: 11.6 G/DL (ref 12–17)
MCH RBC QN AUTO: 29.9 PG (ref 26.8–34.3)
MCHC RBC AUTO-ENTMCNC: 33.1 G/DL (ref 31.4–37.4)
MCV RBC AUTO: 90 FL (ref 82–98)
PLATELET # BLD AUTO: 155 THOUSANDS/UL (ref 149–390)
PMV BLD AUTO: 10.1 FL (ref 8.9–12.7)
POTASSIUM SERPL-SCNC: 3.9 MMOL/L (ref 3.5–5.3)
RBC # BLD AUTO: 3.88 MILLION/UL (ref 3.88–5.62)
SODIUM SERPL-SCNC: 139 MMOL/L (ref 136–145)
WBC # BLD AUTO: 6.12 THOUSAND/UL (ref 4.31–10.16)

## 2021-05-02 PROCEDURE — 99232 SBSQ HOSP IP/OBS MODERATE 35: CPT | Performed by: INTERNAL MEDICINE

## 2021-05-02 PROCEDURE — 85027 COMPLETE CBC AUTOMATED: CPT | Performed by: PHYSICIAN ASSISTANT

## 2021-05-02 PROCEDURE — 80048 BASIC METABOLIC PNL TOTAL CA: CPT | Performed by: PHYSICIAN ASSISTANT

## 2021-05-02 PROCEDURE — 82948 REAGENT STRIP/BLOOD GLUCOSE: CPT

## 2021-05-02 RX ORDER — INSULIN GLARGINE 100 [IU]/ML
32 INJECTION, SOLUTION SUBCUTANEOUS
Status: DISCONTINUED | OUTPATIENT
Start: 2021-05-02 | End: 2021-05-03

## 2021-05-02 RX ADMIN — MIDODRINE HYDROCHLORIDE 5 MG: 5 TABLET ORAL at 17:13

## 2021-05-02 RX ADMIN — INSULIN GLARGINE 32 UNITS: 100 INJECTION, SOLUTION SUBCUTANEOUS at 21:19

## 2021-05-02 RX ADMIN — INSULIN LISPRO 5 UNITS: 100 INJECTION, SOLUTION INTRAVENOUS; SUBCUTANEOUS at 17:13

## 2021-05-02 RX ADMIN — INSULIN LISPRO 5 UNITS: 100 INJECTION, SOLUTION INTRAVENOUS; SUBCUTANEOUS at 12:43

## 2021-05-02 RX ADMIN — INSULIN LISPRO 3 UNITS: 100 INJECTION, SOLUTION INTRAVENOUS; SUBCUTANEOUS at 08:20

## 2021-05-02 RX ADMIN — MIDODRINE HYDROCHLORIDE 5 MG: 5 TABLET ORAL at 08:20

## 2021-05-02 RX ADMIN — MIDODRINE HYDROCHLORIDE 5 MG: 5 TABLET ORAL at 21:19

## 2021-05-02 RX ADMIN — ENOXAPARIN SODIUM 40 MG: 40 INJECTION SUBCUTANEOUS at 08:20

## 2021-05-03 LAB
ANION GAP SERPL CALCULATED.3IONS-SCNC: 7 MMOL/L (ref 4–13)
BUN SERPL-MCNC: 15 MG/DL (ref 5–25)
CALCIUM SERPL-MCNC: 8.9 MG/DL (ref 8.3–10.1)
CHLORIDE SERPL-SCNC: 106 MMOL/L (ref 100–108)
CO2 SERPL-SCNC: 29 MMOL/L (ref 21–32)
CREAT SERPL-MCNC: 0.94 MG/DL (ref 0.6–1.3)
ERYTHROCYTE [DISTWIDTH] IN BLOOD BY AUTOMATED COUNT: 12.7 % (ref 11.6–15.1)
GFR SERPL CREATININE-BSD FRML MDRD: 85 ML/MIN/1.73SQ M
GLUCOSE SERPL-MCNC: 104 MG/DL (ref 65–140)
GLUCOSE SERPL-MCNC: 263 MG/DL (ref 65–140)
GLUCOSE SERPL-MCNC: 296 MG/DL (ref 65–140)
GLUCOSE SERPL-MCNC: 371 MG/DL (ref 65–140)
GLUCOSE SERPL-MCNC: 49 MG/DL (ref 65–140)
GLUCOSE SERPL-MCNC: 99 MG/DL (ref 65–140)
HCT VFR BLD AUTO: 37.3 % (ref 36.5–49.3)
HGB BLD-MCNC: 12.6 G/DL (ref 12–17)
MCH RBC QN AUTO: 30.1 PG (ref 26.8–34.3)
MCHC RBC AUTO-ENTMCNC: 33.8 G/DL (ref 31.4–37.4)
MCV RBC AUTO: 89 FL (ref 82–98)
PLATELET # BLD AUTO: 184 THOUSANDS/UL (ref 149–390)
PMV BLD AUTO: 10.3 FL (ref 8.9–12.7)
POTASSIUM SERPL-SCNC: 3.6 MMOL/L (ref 3.5–5.3)
RBC # BLD AUTO: 4.19 MILLION/UL (ref 3.88–5.62)
SODIUM SERPL-SCNC: 142 MMOL/L (ref 136–145)
WBC # BLD AUTO: 4.85 THOUSAND/UL (ref 4.31–10.16)

## 2021-05-03 PROCEDURE — 97116 GAIT TRAINING THERAPY: CPT

## 2021-05-03 PROCEDURE — 99232 SBSQ HOSP IP/OBS MODERATE 35: CPT | Performed by: PHYSICIAN ASSISTANT

## 2021-05-03 PROCEDURE — 82948 REAGENT STRIP/BLOOD GLUCOSE: CPT

## 2021-05-03 PROCEDURE — 99222 1ST HOSP IP/OBS MODERATE 55: CPT | Performed by: INTERNAL MEDICINE

## 2021-05-03 PROCEDURE — 80048 BASIC METABOLIC PNL TOTAL CA: CPT | Performed by: PHYSICIAN ASSISTANT

## 2021-05-03 PROCEDURE — 97164 PT RE-EVAL EST PLAN CARE: CPT

## 2021-05-03 PROCEDURE — 85027 COMPLETE CBC AUTOMATED: CPT | Performed by: PHYSICIAN ASSISTANT

## 2021-05-03 RX ORDER — INSULIN GLARGINE 100 [IU]/ML
32 INJECTION, SOLUTION SUBCUTANEOUS EVERY MORNING
Status: DISCONTINUED | OUTPATIENT
Start: 2021-05-04 | End: 2021-05-05

## 2021-05-03 RX ADMIN — ENOXAPARIN SODIUM 40 MG: 40 INJECTION SUBCUTANEOUS at 09:01

## 2021-05-03 RX ADMIN — INSULIN LISPRO 4 UNITS: 100 INJECTION, SOLUTION INTRAVENOUS; SUBCUTANEOUS at 21:30

## 2021-05-03 RX ADMIN — INSULIN LISPRO 3 UNITS: 100 INJECTION, SOLUTION INTRAVENOUS; SUBCUTANEOUS at 11:59

## 2021-05-03 RX ADMIN — MIDODRINE HYDROCHLORIDE 5 MG: 5 TABLET ORAL at 21:31

## 2021-05-03 RX ADMIN — INSULIN LISPRO 6 UNITS: 100 INJECTION, SOLUTION INTRAVENOUS; SUBCUTANEOUS at 16:52

## 2021-05-03 RX ADMIN — MIDODRINE HYDROCHLORIDE 5 MG: 5 TABLET ORAL at 16:52

## 2021-05-03 RX ADMIN — MIDODRINE HYDROCHLORIDE 5 MG: 5 TABLET ORAL at 09:01

## 2021-05-04 PROBLEM — G93.41 METABOLIC ENCEPHALOPATHY: Status: ACTIVE | Noted: 2021-04-30

## 2021-05-04 PROBLEM — G93.41 METABOLIC ENCEPHALOPATHY: Status: RESOLVED | Noted: 2021-04-30 | Resolved: 2021-05-04

## 2021-05-04 LAB
ANION GAP SERPL CALCULATED.3IONS-SCNC: 8 MMOL/L (ref 4–13)
BUN SERPL-MCNC: 15 MG/DL (ref 5–25)
CALCIUM SERPL-MCNC: 8.6 MG/DL (ref 8.3–10.1)
CHLORIDE SERPL-SCNC: 104 MMOL/L (ref 100–108)
CO2 SERPL-SCNC: 27 MMOL/L (ref 21–32)
CREAT SERPL-MCNC: 0.99 MG/DL (ref 0.6–1.3)
ERYTHROCYTE [DISTWIDTH] IN BLOOD BY AUTOMATED COUNT: 12.7 % (ref 11.6–15.1)
GFR SERPL CREATININE-BSD FRML MDRD: 80 ML/MIN/1.73SQ M
GLUCOSE SERPL-MCNC: 142 MG/DL (ref 65–140)
GLUCOSE SERPL-MCNC: 153 MG/DL (ref 65–140)
GLUCOSE SERPL-MCNC: 205 MG/DL (ref 65–140)
GLUCOSE SERPL-MCNC: 329 MG/DL (ref 65–140)
GLUCOSE SERPL-MCNC: 361 MG/DL (ref 65–140)
HCT VFR BLD AUTO: 38.1 % (ref 36.5–49.3)
HGB BLD-MCNC: 12.9 G/DL (ref 12–17)
MCH RBC QN AUTO: 30.3 PG (ref 26.8–34.3)
MCHC RBC AUTO-ENTMCNC: 33.9 G/DL (ref 31.4–37.4)
MCV RBC AUTO: 89 FL (ref 82–98)
PLATELET # BLD AUTO: 197 THOUSANDS/UL (ref 149–390)
PMV BLD AUTO: 10 FL (ref 8.9–12.7)
POTASSIUM SERPL-SCNC: 4.1 MMOL/L (ref 3.5–5.3)
RBC # BLD AUTO: 4.26 MILLION/UL (ref 3.88–5.62)
SODIUM SERPL-SCNC: 139 MMOL/L (ref 136–145)
WBC # BLD AUTO: 4.49 THOUSAND/UL (ref 4.31–10.16)

## 2021-05-04 PROCEDURE — 99232 SBSQ HOSP IP/OBS MODERATE 35: CPT | Performed by: HOSPITALIST

## 2021-05-04 PROCEDURE — 80048 BASIC METABOLIC PNL TOTAL CA: CPT | Performed by: PHYSICIAN ASSISTANT

## 2021-05-04 PROCEDURE — 85027 COMPLETE CBC AUTOMATED: CPT | Performed by: PHYSICIAN ASSISTANT

## 2021-05-04 PROCEDURE — 82948 REAGENT STRIP/BLOOD GLUCOSE: CPT

## 2021-05-04 RX ADMIN — MIDODRINE HYDROCHLORIDE 5 MG: 5 TABLET ORAL at 22:00

## 2021-05-04 RX ADMIN — INSULIN LISPRO 5 UNITS: 100 INJECTION, SOLUTION INTRAVENOUS; SUBCUTANEOUS at 11:51

## 2021-05-04 RX ADMIN — INSULIN LISPRO 6 UNITS: 100 INJECTION, SOLUTION INTRAVENOUS; SUBCUTANEOUS at 09:27

## 2021-05-04 RX ADMIN — INSULIN GLARGINE 32 UNITS: 100 INJECTION, SOLUTION SUBCUTANEOUS at 09:32

## 2021-05-04 RX ADMIN — ENOXAPARIN SODIUM 40 MG: 40 INJECTION SUBCUTANEOUS at 09:27

## 2021-05-04 RX ADMIN — INSULIN LISPRO 5 UNITS: 100 INJECTION, SOLUTION INTRAVENOUS; SUBCUTANEOUS at 09:28

## 2021-05-04 RX ADMIN — INSULIN LISPRO 5 UNITS: 100 INJECTION, SOLUTION INTRAVENOUS; SUBCUTANEOUS at 17:23

## 2021-05-04 RX ADMIN — MIDODRINE HYDROCHLORIDE 5 MG: 5 TABLET ORAL at 09:27

## 2021-05-04 RX ADMIN — MIDODRINE HYDROCHLORIDE 5 MG: 5 TABLET ORAL at 16:14

## 2021-05-04 RX ADMIN — INSULIN LISPRO 1 UNITS: 100 INJECTION, SOLUTION INTRAVENOUS; SUBCUTANEOUS at 22:00

## 2021-05-05 LAB
ANION GAP SERPL CALCULATED.3IONS-SCNC: 10 MMOL/L (ref 4–13)
BUN SERPL-MCNC: 17 MG/DL (ref 5–25)
CALCIUM SERPL-MCNC: 8.6 MG/DL (ref 8.3–10.1)
CHLORIDE SERPL-SCNC: 105 MMOL/L (ref 100–108)
CO2 SERPL-SCNC: 25 MMOL/L (ref 21–32)
CREAT SERPL-MCNC: 0.95 MG/DL (ref 0.6–1.3)
ERYTHROCYTE [DISTWIDTH] IN BLOOD BY AUTOMATED COUNT: 12.8 % (ref 11.6–15.1)
GFR SERPL CREATININE-BSD FRML MDRD: 84 ML/MIN/1.73SQ M
GLUCOSE SERPL-MCNC: 133 MG/DL (ref 65–140)
GLUCOSE SERPL-MCNC: 153 MG/DL (ref 65–140)
GLUCOSE SERPL-MCNC: 173 MG/DL (ref 65–140)
GLUCOSE SERPL-MCNC: 192 MG/DL (ref 65–140)
GLUCOSE SERPL-MCNC: 290 MG/DL (ref 65–140)
HCT VFR BLD AUTO: 36.4 % (ref 36.5–49.3)
HGB BLD-MCNC: 12.3 G/DL (ref 12–17)
MCH RBC QN AUTO: 30.2 PG (ref 26.8–34.3)
MCHC RBC AUTO-ENTMCNC: 33.8 G/DL (ref 31.4–37.4)
MCV RBC AUTO: 89 FL (ref 82–98)
PLATELET # BLD AUTO: 194 THOUSANDS/UL (ref 149–390)
PMV BLD AUTO: 9.9 FL (ref 8.9–12.7)
POTASSIUM SERPL-SCNC: 4 MMOL/L (ref 3.5–5.3)
RBC # BLD AUTO: 4.07 MILLION/UL (ref 3.88–5.62)
SARS-COV-2 RNA RESP QL NAA+PROBE: NEGATIVE
SODIUM SERPL-SCNC: 140 MMOL/L (ref 136–145)
WBC # BLD AUTO: 4.51 THOUSAND/UL (ref 4.31–10.16)

## 2021-05-05 PROCEDURE — NC001 PR NO CHARGE: Performed by: HOSPITALIST

## 2021-05-05 PROCEDURE — 85027 COMPLETE CBC AUTOMATED: CPT | Performed by: PHYSICIAN ASSISTANT

## 2021-05-05 PROCEDURE — U0003 INFECTIOUS AGENT DETECTION BY NUCLEIC ACID (DNA OR RNA); SEVERE ACUTE RESPIRATORY SYNDROME CORONAVIRUS 2 (SARS-COV-2) (CORONAVIRUS DISEASE [COVID-19]), AMPLIFIED PROBE TECHNIQUE, MAKING USE OF HIGH THROUGHPUT TECHNOLOGIES AS DESCRIBED BY CMS-2020-01-R: HCPCS | Performed by: PHYSICIAN ASSISTANT

## 2021-05-05 PROCEDURE — 80048 BASIC METABOLIC PNL TOTAL CA: CPT | Performed by: PHYSICIAN ASSISTANT

## 2021-05-05 PROCEDURE — U0005 INFEC AGEN DETEC AMPLI PROBE: HCPCS | Performed by: PHYSICIAN ASSISTANT

## 2021-05-05 PROCEDURE — 82948 REAGENT STRIP/BLOOD GLUCOSE: CPT

## 2021-05-05 PROCEDURE — 99232 SBSQ HOSP IP/OBS MODERATE 35: CPT | Performed by: PHYSICIAN ASSISTANT

## 2021-05-05 RX ORDER — INSULIN GLARGINE 100 [IU]/ML
25 INJECTION, SOLUTION SUBCUTANEOUS EVERY MORNING
Status: DISCONTINUED | OUTPATIENT
Start: 2021-05-06 | End: 2021-05-10 | Stop reason: HOSPADM

## 2021-05-05 RX ADMIN — MIDODRINE HYDROCHLORIDE 5 MG: 5 TABLET ORAL at 08:25

## 2021-05-05 RX ADMIN — INSULIN LISPRO 2 UNITS: 100 INJECTION, SOLUTION INTRAVENOUS; SUBCUTANEOUS at 08:25

## 2021-05-05 RX ADMIN — MIDODRINE HYDROCHLORIDE 5 MG: 5 TABLET ORAL at 15:18

## 2021-05-05 RX ADMIN — INSULIN GLARGINE 32 UNITS: 100 INJECTION, SOLUTION SUBCUTANEOUS at 08:33

## 2021-05-05 RX ADMIN — INSULIN LISPRO 5 UNITS: 100 INJECTION, SOLUTION INTRAVENOUS; SUBCUTANEOUS at 11:38

## 2021-05-05 RX ADMIN — MIDODRINE HYDROCHLORIDE 5 MG: 5 TABLET ORAL at 22:00

## 2021-05-05 RX ADMIN — ENOXAPARIN SODIUM 40 MG: 40 INJECTION SUBCUTANEOUS at 08:25

## 2021-05-05 RX ADMIN — INSULIN LISPRO 5 UNITS: 100 INJECTION, SOLUTION INTRAVENOUS; SUBCUTANEOUS at 08:26

## 2021-05-05 RX ADMIN — INSULIN LISPRO 5 UNITS: 100 INJECTION, SOLUTION INTRAVENOUS; SUBCUTANEOUS at 16:58

## 2021-05-05 RX ADMIN — INSULIN LISPRO 1 UNITS: 100 INJECTION, SOLUTION INTRAVENOUS; SUBCUTANEOUS at 22:02

## 2021-05-05 RX ADMIN — INSULIN LISPRO 4 UNITS: 100 INJECTION, SOLUTION INTRAVENOUS; SUBCUTANEOUS at 11:37

## 2021-05-06 LAB
GLUCOSE SERPL-MCNC: 108 MG/DL (ref 65–140)
GLUCOSE SERPL-MCNC: 140 MG/DL (ref 65–140)
GLUCOSE SERPL-MCNC: 87 MG/DL (ref 65–140)
GLUCOSE SERPL-MCNC: 94 MG/DL (ref 65–140)

## 2021-05-06 PROCEDURE — 82948 REAGENT STRIP/BLOOD GLUCOSE: CPT

## 2021-05-06 PROCEDURE — 97116 GAIT TRAINING THERAPY: CPT

## 2021-05-06 PROCEDURE — 97110 THERAPEUTIC EXERCISES: CPT

## 2021-05-06 PROCEDURE — 97530 THERAPEUTIC ACTIVITIES: CPT

## 2021-05-06 PROCEDURE — 99232 SBSQ HOSP IP/OBS MODERATE 35: CPT | Performed by: INTERNAL MEDICINE

## 2021-05-06 RX ADMIN — INSULIN GLARGINE 25 UNITS: 100 INJECTION, SOLUTION SUBCUTANEOUS at 08:59

## 2021-05-06 RX ADMIN — INSULIN LISPRO 5 UNITS: 100 INJECTION, SOLUTION INTRAVENOUS; SUBCUTANEOUS at 17:50

## 2021-05-06 RX ADMIN — MIDODRINE HYDROCHLORIDE 5 MG: 5 TABLET ORAL at 21:18

## 2021-05-06 RX ADMIN — ENOXAPARIN SODIUM 40 MG: 40 INJECTION SUBCUTANEOUS at 08:59

## 2021-05-06 RX ADMIN — INSULIN LISPRO 5 UNITS: 100 INJECTION, SOLUTION INTRAVENOUS; SUBCUTANEOUS at 13:10

## 2021-05-06 RX ADMIN — INSULIN LISPRO 5 UNITS: 100 INJECTION, SOLUTION INTRAVENOUS; SUBCUTANEOUS at 08:59

## 2021-05-06 RX ADMIN — MIDODRINE HYDROCHLORIDE 5 MG: 5 TABLET ORAL at 09:00

## 2021-05-07 LAB
GLUCOSE SERPL-MCNC: 103 MG/DL (ref 65–140)
GLUCOSE SERPL-MCNC: 233 MG/DL (ref 65–140)
GLUCOSE SERPL-MCNC: 297 MG/DL (ref 65–140)
GLUCOSE SERPL-MCNC: 76 MG/DL (ref 65–140)

## 2021-05-07 PROCEDURE — 82948 REAGENT STRIP/BLOOD GLUCOSE: CPT

## 2021-05-07 PROCEDURE — NC001 PR NO CHARGE: Performed by: PHYSICIAN ASSISTANT

## 2021-05-07 PROCEDURE — 99231 SBSQ HOSP IP/OBS SF/LOW 25: CPT | Performed by: HOSPITALIST

## 2021-05-07 RX ADMIN — INSULIN LISPRO 5 UNITS: 100 INJECTION, SOLUTION INTRAVENOUS; SUBCUTANEOUS at 08:59

## 2021-05-07 RX ADMIN — INSULIN LISPRO 4 UNITS: 100 INJECTION, SOLUTION INTRAVENOUS; SUBCUTANEOUS at 12:00

## 2021-05-07 RX ADMIN — MIDODRINE HYDROCHLORIDE 5 MG: 5 TABLET ORAL at 08:59

## 2021-05-07 RX ADMIN — INSULIN GLARGINE 25 UNITS: 100 INJECTION, SOLUTION SUBCUTANEOUS at 08:59

## 2021-05-07 RX ADMIN — INSULIN LISPRO 5 UNITS: 100 INJECTION, SOLUTION INTRAVENOUS; SUBCUTANEOUS at 12:01

## 2021-05-07 RX ADMIN — INSULIN LISPRO 3 UNITS: 100 INJECTION, SOLUTION INTRAVENOUS; SUBCUTANEOUS at 17:06

## 2021-05-07 RX ADMIN — ENOXAPARIN SODIUM 40 MG: 40 INJECTION SUBCUTANEOUS at 08:59

## 2021-05-07 RX ADMIN — MIDODRINE HYDROCHLORIDE 5 MG: 5 TABLET ORAL at 17:05

## 2021-05-07 RX ADMIN — MIDODRINE HYDROCHLORIDE 5 MG: 5 TABLET ORAL at 21:16

## 2021-05-07 RX ADMIN — INSULIN LISPRO 5 UNITS: 100 INJECTION, SOLUTION INTRAVENOUS; SUBCUTANEOUS at 17:06

## 2021-05-08 LAB
GLUCOSE SERPL-MCNC: 134 MG/DL (ref 65–140)
GLUCOSE SERPL-MCNC: 157 MG/DL (ref 65–140)
GLUCOSE SERPL-MCNC: 196 MG/DL (ref 65–140)
GLUCOSE SERPL-MCNC: 211 MG/DL (ref 65–140)
GLUCOSE SERPL-MCNC: 245 MG/DL (ref 65–140)

## 2021-05-08 PROCEDURE — 99231 SBSQ HOSP IP/OBS SF/LOW 25: CPT | Performed by: PHYSICIAN ASSISTANT

## 2021-05-08 PROCEDURE — 82948 REAGENT STRIP/BLOOD GLUCOSE: CPT

## 2021-05-08 RX ADMIN — INSULIN GLARGINE 25 UNITS: 100 INJECTION, SOLUTION SUBCUTANEOUS at 08:04

## 2021-05-08 RX ADMIN — INSULIN LISPRO 5 UNITS: 100 INJECTION, SOLUTION INTRAVENOUS; SUBCUTANEOUS at 17:43

## 2021-05-08 RX ADMIN — MIDODRINE HYDROCHLORIDE 5 MG: 5 TABLET ORAL at 08:04

## 2021-05-08 RX ADMIN — INSULIN LISPRO 1 UNITS: 100 INJECTION, SOLUTION INTRAVENOUS; SUBCUTANEOUS at 21:04

## 2021-05-08 RX ADMIN — INSULIN LISPRO 5 UNITS: 100 INJECTION, SOLUTION INTRAVENOUS; SUBCUTANEOUS at 11:54

## 2021-05-08 RX ADMIN — INSULIN LISPRO 2 UNITS: 100 INJECTION, SOLUTION INTRAVENOUS; SUBCUTANEOUS at 08:04

## 2021-05-08 RX ADMIN — ENOXAPARIN SODIUM 40 MG: 40 INJECTION SUBCUTANEOUS at 08:04

## 2021-05-08 RX ADMIN — INSULIN LISPRO 3 UNITS: 100 INJECTION, SOLUTION INTRAVENOUS; SUBCUTANEOUS at 11:53

## 2021-05-08 RX ADMIN — MIDODRINE HYDROCHLORIDE 5 MG: 5 TABLET ORAL at 17:43

## 2021-05-08 RX ADMIN — INSULIN LISPRO 5 UNITS: 100 INJECTION, SOLUTION INTRAVENOUS; SUBCUTANEOUS at 08:04

## 2021-05-08 RX ADMIN — INSULIN LISPRO 2 UNITS: 100 INJECTION, SOLUTION INTRAVENOUS; SUBCUTANEOUS at 17:43

## 2021-05-08 RX ADMIN — MIDODRINE HYDROCHLORIDE 5 MG: 5 TABLET ORAL at 21:03

## 2021-05-09 LAB
GLUCOSE SERPL-MCNC: 113 MG/DL (ref 65–140)
GLUCOSE SERPL-MCNC: 299 MG/DL (ref 65–140)
GLUCOSE SERPL-MCNC: 65 MG/DL (ref 65–140)
GLUCOSE SERPL-MCNC: 69 MG/DL (ref 65–140)
GLUCOSE SERPL-MCNC: 92 MG/DL (ref 65–140)
GLUCOSE SERPL-MCNC: 93 MG/DL (ref 65–140)

## 2021-05-09 PROCEDURE — 82948 REAGENT STRIP/BLOOD GLUCOSE: CPT

## 2021-05-09 PROCEDURE — 99231 SBSQ HOSP IP/OBS SF/LOW 25: CPT | Performed by: PHYSICIAN ASSISTANT

## 2021-05-09 RX ADMIN — INSULIN LISPRO 4 UNITS: 100 INJECTION, SOLUTION INTRAVENOUS; SUBCUTANEOUS at 12:17

## 2021-05-09 RX ADMIN — MIDODRINE HYDROCHLORIDE 5 MG: 5 TABLET ORAL at 17:23

## 2021-05-09 RX ADMIN — MIDODRINE HYDROCHLORIDE 5 MG: 5 TABLET ORAL at 21:25

## 2021-05-09 RX ADMIN — ENOXAPARIN SODIUM 40 MG: 40 INJECTION SUBCUTANEOUS at 08:58

## 2021-05-09 RX ADMIN — MIDODRINE HYDROCHLORIDE 5 MG: 5 TABLET ORAL at 08:58

## 2021-05-09 RX ADMIN — INSULIN GLARGINE 25 UNITS: 100 INJECTION, SOLUTION SUBCUTANEOUS at 09:03

## 2021-05-09 RX ADMIN — INSULIN LISPRO 5 UNITS: 100 INJECTION, SOLUTION INTRAVENOUS; SUBCUTANEOUS at 17:24

## 2021-05-09 RX ADMIN — INSULIN LISPRO 5 UNITS: 100 INJECTION, SOLUTION INTRAVENOUS; SUBCUTANEOUS at 12:16

## 2021-05-09 RX ADMIN — INSULIN LISPRO 5 UNITS: 100 INJECTION, SOLUTION INTRAVENOUS; SUBCUTANEOUS at 09:00

## 2021-05-10 VITALS
HEIGHT: 70 IN | DIASTOLIC BLOOD PRESSURE: 65 MMHG | WEIGHT: 188.49 LBS | HEART RATE: 73 BPM | BODY MASS INDEX: 26.99 KG/M2 | TEMPERATURE: 97.6 F | SYSTOLIC BLOOD PRESSURE: 100 MMHG | OXYGEN SATURATION: 96 % | RESPIRATION RATE: 18 BRPM

## 2021-05-10 LAB
GLUCOSE SERPL-MCNC: 163 MG/DL (ref 65–140)
GLUCOSE SERPL-MCNC: 208 MG/DL (ref 65–140)
GLUCOSE SERPL-MCNC: 65 MG/DL (ref 65–140)

## 2021-05-10 PROCEDURE — 82948 REAGENT STRIP/BLOOD GLUCOSE: CPT

## 2021-05-10 PROCEDURE — 97116 GAIT TRAINING THERAPY: CPT

## 2021-05-10 PROCEDURE — 97110 THERAPEUTIC EXERCISES: CPT

## 2021-05-10 PROCEDURE — 99239 HOSP IP/OBS DSCHRG MGMT >30: CPT | Performed by: PHYSICIAN ASSISTANT

## 2021-05-10 RX ORDER — INSULIN GLARGINE 100 [IU]/ML
25 INJECTION, SOLUTION SUBCUTANEOUS DAILY
Qty: 15 ML | Refills: 0 | Status: SHIPPED
Start: 2021-05-10

## 2021-05-10 RX ORDER — INSULIN ASPART 100 [IU]/ML
5 INJECTION, SOLUTION INTRAVENOUS; SUBCUTANEOUS
Qty: 15 ML | Refills: 0 | Status: SHIPPED
Start: 2021-05-10

## 2021-05-10 RX ADMIN — INSULIN GLARGINE 25 UNITS: 100 INJECTION, SOLUTION SUBCUTANEOUS at 07:58

## 2021-05-10 RX ADMIN — INSULIN LISPRO 5 UNITS: 100 INJECTION, SOLUTION INTRAVENOUS; SUBCUTANEOUS at 07:58

## 2021-05-10 RX ADMIN — MIDODRINE HYDROCHLORIDE 5 MG: 5 TABLET ORAL at 07:58

## 2021-05-10 RX ADMIN — INSULIN LISPRO 1 UNITS: 100 INJECTION, SOLUTION INTRAVENOUS; SUBCUTANEOUS at 07:58

## 2021-07-11 NOTE — ED RE-EVALUATION NOTE
Care is being handed over to me from Dr Jia Oakes, please see his note for initial presentation and workup  ED workup negative for acute changes, goal is repeat troponin at 9am       10:00 repeat troponin and EKG are normal   He can be discharged back to his care facility       12:01  Inclement weather so he has not transported yet, blood sugar is elevated, so we are feeding him lunch meal and giving insulin according to his sliding scale-lispro 10 unit     Elida Lim MD  12/02/19 Marcus DE LEON 935 Wu Hinkle MD  12/02/19 5337 DISPLAY PLAN FREE TEXT

## 2022-01-27 ENCOUNTER — APPOINTMENT (EMERGENCY)
Dept: RADIOLOGY | Facility: HOSPITAL | Age: 67
End: 2022-01-27
Payer: COMMERCIAL

## 2022-01-27 ENCOUNTER — HOSPITAL ENCOUNTER (EMERGENCY)
Facility: HOSPITAL | Age: 67
Discharge: NURSING, REHAB OR LONG-TERM CARE | End: 2022-01-27
Attending: EMERGENCY MEDICINE
Payer: COMMERCIAL

## 2022-01-27 ENCOUNTER — APPOINTMENT (EMERGENCY)
Dept: CT IMAGING | Facility: HOSPITAL | Age: 67
End: 2022-01-27
Payer: COMMERCIAL

## 2022-01-27 VITALS
SYSTOLIC BLOOD PRESSURE: 158 MMHG | OXYGEN SATURATION: 95 % | HEART RATE: 82 BPM | DIASTOLIC BLOOD PRESSURE: 81 MMHG | TEMPERATURE: 98.8 F | RESPIRATION RATE: 16 BRPM

## 2022-01-27 DIAGNOSIS — K20.90 ESOPHAGITIS: Primary | ICD-10-CM

## 2022-01-27 DIAGNOSIS — K59.00 CONSTIPATION: ICD-10-CM

## 2022-01-27 LAB
ABO GROUP BLD: NORMAL
ALBUMIN SERPL BCP-MCNC: 3.6 G/DL (ref 3.5–5)
ALP SERPL-CCNC: 71 U/L (ref 46–116)
ALT SERPL W P-5'-P-CCNC: 19 U/L (ref 12–78)
ANION GAP SERPL CALCULATED.3IONS-SCNC: 9 MMOL/L (ref 4–13)
AST SERPL W P-5'-P-CCNC: 14 U/L (ref 5–45)
ATRIAL RATE: 88 BPM
BASE EX.OXY STD BLDV CALC-SCNC: 59.3 % (ref 60–80)
BASE EXCESS BLDV CALC-SCNC: -0.7 MMOL/L
BASOPHILS # BLD AUTO: 0.02 THOUSANDS/ΜL (ref 0–0.1)
BASOPHILS NFR BLD AUTO: 0 % (ref 0–1)
BETA-HYDROXYBUTYRATE: 0.1 MMOL/L
BILIRUB SERPL-MCNC: 0.52 MG/DL (ref 0.2–1)
BILIRUB UR QL STRIP: NEGATIVE
BLD GP AB SCN SERPL QL: NEGATIVE
BUN SERPL-MCNC: 17 MG/DL (ref 5–25)
CALCIUM SERPL-MCNC: 9.1 MG/DL (ref 8.3–10.1)
CARDIAC TROPONIN I PNL SERPL HS: 5 NG/L
CHLORIDE SERPL-SCNC: 106 MMOL/L (ref 100–108)
CLARITY UR: CLEAR
CO2 SERPL-SCNC: 27 MMOL/L (ref 21–32)
COLOR UR: YELLOW
CREAT SERPL-MCNC: 1.29 MG/DL (ref 0.6–1.3)
EOSINOPHIL # BLD AUTO: 0.14 THOUSAND/ΜL (ref 0–0.61)
EOSINOPHIL NFR BLD AUTO: 2 % (ref 0–6)
ERYTHROCYTE [DISTWIDTH] IN BLOOD BY AUTOMATED COUNT: 12.8 % (ref 11.6–15.1)
GFR SERPL CREATININE-BSD FRML MDRD: 57 ML/MIN/1.73SQ M
GLUCOSE SERPL-MCNC: 79 MG/DL (ref 65–140)
GLUCOSE SERPL-MCNC: 88 MG/DL (ref 65–140)
GLUCOSE UR STRIP-MCNC: ABNORMAL MG/DL
HCO3 BLDV-SCNC: 23.8 MMOL/L (ref 24–30)
HCT VFR BLD AUTO: 39.5 % (ref 36.5–49.3)
HGB BLD-MCNC: 13.7 G/DL (ref 12–17)
HGB UR QL STRIP.AUTO: NEGATIVE
IMM GRANULOCYTES # BLD AUTO: 0.05 THOUSAND/UL (ref 0–0.2)
IMM GRANULOCYTES NFR BLD AUTO: 1 % (ref 0–2)
KETONES UR STRIP-MCNC: ABNORMAL MG/DL
LACTATE SERPL-SCNC: 1.7 MMOL/L (ref 0.5–2)
LEUKOCYTE ESTERASE UR QL STRIP: NEGATIVE
LIPASE SERPL-CCNC: 23 U/L (ref 73–393)
LYMPHOCYTES # BLD AUTO: 1.29 THOUSANDS/ΜL (ref 0.6–4.47)
LYMPHOCYTES NFR BLD AUTO: 17 % (ref 14–44)
MAGNESIUM SERPL-MCNC: 2.2 MG/DL (ref 1.6–2.6)
MCH RBC QN AUTO: 31.3 PG (ref 26.8–34.3)
MCHC RBC AUTO-ENTMCNC: 34.7 G/DL (ref 31.4–37.4)
MCV RBC AUTO: 90 FL (ref 82–98)
MONOCYTES # BLD AUTO: 0.7 THOUSAND/ΜL (ref 0.17–1.22)
MONOCYTES NFR BLD AUTO: 9 % (ref 4–12)
NEUTROPHILS # BLD AUTO: 5.42 THOUSANDS/ΜL (ref 1.85–7.62)
NEUTS SEG NFR BLD AUTO: 71 % (ref 43–75)
NITRITE UR QL STRIP: NEGATIVE
NRBC BLD AUTO-RTO: 0 /100 WBCS
O2 CT BLDV-SCNC: 11.7 ML/DL
PCO2 BLDV: 39.2 MM HG (ref 42–50)
PH BLDV: 7.4 [PH] (ref 7.3–7.4)
PH UR STRIP.AUTO: 6 [PH] (ref 4.5–8)
PLATELET # BLD AUTO: 274 THOUSANDS/UL (ref 149–390)
PMV BLD AUTO: 10.2 FL (ref 8.9–12.7)
PO2 BLDV: 33 MM HG (ref 35–45)
POTASSIUM SERPL-SCNC: 3.9 MMOL/L (ref 3.5–5.3)
PROT SERPL-MCNC: 7.2 G/DL (ref 6.4–8.2)
PROT UR STRIP-MCNC: NEGATIVE MG/DL
QRS AXIS: 8 DEGREES
QRSD INTERVAL: 76 MS
QT INTERVAL: 346 MS
QTC INTERVAL: 404 MS
RBC # BLD AUTO: 4.38 MILLION/UL (ref 3.88–5.62)
RH BLD: POSITIVE
SODIUM SERPL-SCNC: 142 MMOL/L (ref 136–145)
SP GR UR STRIP.AUTO: 1.01 (ref 1–1.03)
SPECIMEN EXPIRATION DATE: NORMAL
T WAVE AXIS: 52 DEGREES
UROBILINOGEN UR QL STRIP.AUTO: 0.2 E.U./DL
VENTRICULAR RATE: 82 BPM
WBC # BLD AUTO: 7.62 THOUSAND/UL (ref 4.31–10.16)

## 2022-01-27 PROCEDURE — 80053 COMPREHEN METABOLIC PANEL: CPT | Performed by: PHYSICIAN ASSISTANT

## 2022-01-27 PROCEDURE — 83690 ASSAY OF LIPASE: CPT | Performed by: PHYSICIAN ASSISTANT

## 2022-01-27 PROCEDURE — 85025 COMPLETE CBC W/AUTO DIFF WBC: CPT | Performed by: PHYSICIAN ASSISTANT

## 2022-01-27 PROCEDURE — 83735 ASSAY OF MAGNESIUM: CPT | Performed by: PHYSICIAN ASSISTANT

## 2022-01-27 PROCEDURE — 99284 EMERGENCY DEPT VISIT MOD MDM: CPT | Performed by: PHYSICIAN ASSISTANT

## 2022-01-27 PROCEDURE — 93005 ELECTROCARDIOGRAM TRACING: CPT

## 2022-01-27 PROCEDURE — 96361 HYDRATE IV INFUSION ADD-ON: CPT

## 2022-01-27 PROCEDURE — G1004 CDSM NDSC: HCPCS

## 2022-01-27 PROCEDURE — 99285 EMERGENCY DEPT VISIT HI MDM: CPT

## 2022-01-27 PROCEDURE — 81003 URINALYSIS AUTO W/O SCOPE: CPT

## 2022-01-27 PROCEDURE — 36415 COLL VENOUS BLD VENIPUNCTURE: CPT | Performed by: PHYSICIAN ASSISTANT

## 2022-01-27 PROCEDURE — 71045 X-RAY EXAM CHEST 1 VIEW: CPT

## 2022-01-27 PROCEDURE — 71275 CT ANGIOGRAPHY CHEST: CPT

## 2022-01-27 PROCEDURE — 82805 BLOOD GASES W/O2 SATURATION: CPT | Performed by: PHYSICIAN ASSISTANT

## 2022-01-27 PROCEDURE — 82948 REAGENT STRIP/BLOOD GLUCOSE: CPT

## 2022-01-27 PROCEDURE — 74177 CT ABD & PELVIS W/CONTRAST: CPT

## 2022-01-27 PROCEDURE — 96374 THER/PROPH/DIAG INJ IV PUSH: CPT

## 2022-01-27 PROCEDURE — 86850 RBC ANTIBODY SCREEN: CPT | Performed by: PHYSICIAN ASSISTANT

## 2022-01-27 PROCEDURE — 82010 KETONE BODYS QUAN: CPT | Performed by: PHYSICIAN ASSISTANT

## 2022-01-27 PROCEDURE — 93010 ELECTROCARDIOGRAM REPORT: CPT | Performed by: INTERNAL MEDICINE

## 2022-01-27 PROCEDURE — 86901 BLOOD TYPING SEROLOGIC RH(D): CPT | Performed by: PHYSICIAN ASSISTANT

## 2022-01-27 PROCEDURE — 86900 BLOOD TYPING SEROLOGIC ABO: CPT | Performed by: PHYSICIAN ASSISTANT

## 2022-01-27 PROCEDURE — 83605 ASSAY OF LACTIC ACID: CPT | Performed by: PHYSICIAN ASSISTANT

## 2022-01-27 PROCEDURE — 84484 ASSAY OF TROPONIN QUANT: CPT | Performed by: PHYSICIAN ASSISTANT

## 2022-01-27 RX ORDER — DEXTROSE MONOHYDRATE 25 G/50ML
25 INJECTION, SOLUTION INTRAVENOUS ONCE
Status: DISCONTINUED | OUTPATIENT
Start: 2022-01-27 | End: 2022-01-27

## 2022-01-27 RX ORDER — ATORVASTATIN CALCIUM 20 MG/1
20 TABLET, FILM COATED ORAL DAILY
COMMUNITY

## 2022-01-27 RX ORDER — PANTOPRAZOLE SODIUM 20 MG/1
40 TABLET, DELAYED RELEASE ORAL DAILY
Qty: 20 TABLET | Refills: 0 | Status: SHIPPED | OUTPATIENT
Start: 2022-01-27

## 2022-01-27 RX ORDER — METOCLOPRAMIDE HYDROCHLORIDE 5 MG/ML
10 INJECTION INTRAMUSCULAR; INTRAVENOUS ONCE
Status: COMPLETED | OUTPATIENT
Start: 2022-01-27 | End: 2022-01-27

## 2022-01-27 RX ORDER — POLYETHYLENE GLYCOL 3350 17 G/17G
17 POWDER, FOR SOLUTION ORAL DAILY
Qty: 30 EACH | Refills: 0 | Status: SHIPPED | OUTPATIENT
Start: 2022-01-27

## 2022-01-27 RX ORDER — DOCUSATE SODIUM 100 MG/1
100 CAPSULE, LIQUID FILLED ORAL EVERY 12 HOURS
Qty: 60 CAPSULE | Refills: 0 | Status: SHIPPED | OUTPATIENT
Start: 2022-01-27

## 2022-01-27 RX ORDER — DORZOLAMIDE HYDROCHLORIDE AND TIMOLOL MALEATE 20; 5 MG/ML; MG/ML
1 SOLUTION/ DROPS OPHTHALMIC 2 TIMES DAILY
COMMUNITY

## 2022-01-27 RX ADMIN — IOHEXOL 100 ML: 350 INJECTION, SOLUTION INTRAVENOUS at 13:04

## 2022-01-27 RX ADMIN — SODIUM CHLORIDE 1000 ML: 0.9 INJECTION, SOLUTION INTRAVENOUS at 12:07

## 2022-01-27 RX ADMIN — METOCLOPRAMIDE 10 MG: 5 INJECTION, SOLUTION INTRAMUSCULAR; INTRAVENOUS at 12:07

## 2022-01-27 NOTE — ED PROVIDER NOTES
History  Chief Complaint   Patient presents with    Abdominal Pain     Generalized abdominal pain with v/d and chest pain beginning this morning  Zofran given by nursing home staff prior to calling ems without relief   Chest Pain       Abdominal Pain  Pain location:  Generalized  Pain radiates to:  Chest and epigastric region  Onset quality:  Gradual  Timing:  Intermittent  Progression:  Waxing and waning  Chronicity:  Recurrent  Context: eating and laxative use    Context: not recent illness, not recent sexual activity, not recent travel, not sick contacts, not suspicious food intake and not trauma    Relieved by:  Vomiting  Worsened by:  Eating  Ineffective treatments:  Vomiting, belching, flatus, movement and position changes  Associated symptoms: chest pain, constipation, diarrhea, nausea and vomiting    Associated symptoms: no chills, no cough, no dysuria, no fever, no hematuria, no shortness of breath and no sore throat    Risk factors: being elderly and recent hospitalization    Risk factors: no NSAID use and not obese    Chest Pain  Associated symptoms: abdominal pain, nausea and vomiting    Associated symptoms: no back pain, no cough, no fever, no palpitations and no shortness of breath        Prior to Admission Medications   Prescriptions Last Dose Informant Patient Reported? Taking?    Calcium Carbonate-Vitamin D 600-200 MG-UNIT CAPS   Yes Yes   Sig: Take 1 tablet by mouth 2 (two) times a day   Dextrose, Diabetic Use, 15 GM/33GM GEL   Yes Yes   Sig: Take 1 packet by mouth   Pramoxine-Calamine (AVEENO ANTI-ITCH EX)   Yes Yes   Sig: Apply topically   Sennosides-Docusate Sodium (SENNA PLUS PO) Not Taking at Unknown time  Yes No   Sig: Take by mouth   Patient not taking: Reported on 1/27/2022    apixaban (Eliquis) 5 mg   Yes Yes   Sig: Take 5 mg by mouth 2 (two) times a day   atorvastatin (LIPITOR) 20 mg tablet   Yes Yes   Sig: Take 20 mg by mouth daily   dorzolamide-timolol (COSOPT) 22 3-6 8 MG/ML ophthalmic solution   Yes Yes   Sig: Administer 1 drop to the right eye 2 (two) times a day   insulin aspart (NovoLOG FlexPen) 100 UNIT/ML injection pen   No Yes   Sig: Inject 5 Units under the skin 3 (three) times a day with meals   insulin glargine (Lantus SoloStar) 100 units/mL injection pen   No Yes   Sig: Inject 25 Units under the skin daily   Patient taking differently: Inject 28 Units under the skin daily     insulin lispro (HumaLOG) 100 units/mL injection Not Taking at Unknown time  No No   Sig: Inject 1-6 Units under the skin 3 (three) times a day before meals Blood Glucose 150 - 189: 1 Unit of Insulin  Blood Glucose 190 - 229: 2 Units of Insulin  Blood Glucose 230 - 269: 3 Units of Insulin  Blood Glucose 270 - 309: 4 Units of Insulin  Blood Glucose 310 - 349: 5 Units of Insulin  Blood Glucose greater than or equal to 350: 6 Units of Insulin   Patient not taking: Reported on 1/27/2022    midodrine (PROAMATINE) 5 mg tablet   Yes Yes   Sig: Take 5 mg by mouth 3 (three) times a day   polyethylene glycol (MIRALAX) 17 g packet Not Taking at Unknown time  Yes No   Sig: Take 17 g by mouth daily   Patient not taking: Reported on 1/27/2022    simvastatin (ZOCOR) 20 mg tablet   Yes No   Sig: Take 20 mg by mouth every evening      Facility-Administered Medications: None       Past Medical History:   Diagnosis Date    [facility] kidney injury ([facility] )     Dehydration     Diabetes mellitus ([facility] )     Type 1    Lymphoma ([facility] )        History reviewed  No pertinent surgical history  History reviewed  No pertinent family history  I have reviewed and agree with the history as documented      E-Cigarette/Vaping    E-Cigarette Use Never User      E-Cigarette/Vaping Substances    Nicotine No     THC No     CBD No     Flavoring No     Other No     Unknown No      Social History     Tobacco Use    Smoking status: Never Smoker    Smokeless tobacco: Never Used   Vaping Use    Vaping Use: Never used   Substance Use Topics  Alcohol use: Not Currently    Drug use: Never       Review of Systems   Constitutional: Positive for appetite change  Negative for chills and fever  HENT: Negative for ear pain and sore throat  Eyes: Negative for pain and visual disturbance  Respiratory: Negative for cough and shortness of breath  Cardiovascular: Positive for chest pain  Negative for palpitations  Gastrointestinal: Positive for abdominal pain, constipation, diarrhea, nausea and vomiting  Genitourinary: Negative for dysuria and hematuria  Musculoskeletal: Negative for arthralgias and back pain  Skin: Negative for color change and rash  Neurological: Negative for seizures and syncope  Psychiatric/Behavioral: Negative for confusion and sleep disturbance  All other systems reviewed and are negative  Physical Exam  Physical Exam  Vitals and nursing note reviewed  Constitutional:       General: He is not in acute distress  Appearance: Normal appearance  He is not ill-appearing, toxic-appearing or diaphoretic  HENT:      Head: Normocephalic and atraumatic  Mouth/Throat:      Mouth: Mucous membranes are moist    Eyes:      General: No scleral icterus  Pupils: Pupils are equal, round, and reactive to light  Cardiovascular:      Rate and Rhythm: Normal rate and regular rhythm  Pulses: Normal pulses  Heart sounds: Normal heart sounds  Pulmonary:      Effort: Pulmonary effort is normal       Breath sounds: Normal breath sounds  Abdominal:      General: Abdomen is flat  There is no distension  Palpations: Abdomen is soft  Tenderness: There is generalized abdominal tenderness  There is no guarding or rebound  Hernia: No hernia is present  Musculoskeletal:         General: No swelling or tenderness  Normal range of motion  Cervical back: Normal range of motion  Skin:     General: Skin is warm  Capillary Refill: Capillary refill takes less than 2 seconds     Neurological: General: No focal deficit present  Mental Status: He is alert and oriented to person, place, and time  Psychiatric:         Mood and Affect: Mood normal          Behavior: Behavior normal          Vital Signs  ED Triage Vitals [01/27/22 1100]   Temperature Pulse Respirations Blood Pressure SpO2   98 8 °F (37 1 °C) 82 18 111/66 95 %      Temp Source Heart Rate Source Patient Position - Orthostatic VS BP Location FiO2 (%)   Oral Monitor Lying Left arm --      Pain Score       8           Vitals:    01/27/22 1100 01/27/22 1308   BP: 111/66 158/81   Pulse: 82 82   Patient Position - Orthostatic VS: Lying Lying         Visual Acuity      ED Medications  Medications   metoclopramide (REGLAN) injection 10 mg (10 mg Intravenous Given 1/27/22 1207)   sodium chloride 0 9 % bolus 1,000 mL (0 mL Intravenous Stopped 1/27/22 1852)   iohexol (OMNIPAQUE) 350 MG/ML injection (SINGLE-DOSE) 100 mL (100 mL Intravenous Given 1/27/22 1304)       Diagnostic Studies  Results Reviewed     Procedure Component Value Units Date/Time    Urine Macroscopic, POC [[id number]]  (Abnormal) Collected: 01/27/22 1355    Lab Status: Final result Specimen: Urine Updated: 01/27/22 1356     Color, UA Yellow     Clarity, UA Clear     pH, UA 6 0     Leukocytes, UA Negative     Nitrite, UA Negative     Protein, UA Negative mg/dl      Glucose,  (1/10%) mg/dl      Ketones, UA 15 (1+) mg/dl      Urobilinogen, UA 0 2 E U /dl      Bilirubin, UA Negative     Blood, UA Negative     Specific Gravity, UA 1 010    Narrative:      CLINITEK RESULT    Lactic acid [[id number]]  (Normal) Collected: 01/27/22 1125    Lab Status: Final result Specimen: Blood from Arm, Right Updated: 01/27/22 1220     LACTIC ACID 1 7 mmol/L     Narrative:      Result may be elevated if tourniquet was used during collection      HS Troponin 0hr (reflex protocol) [[id number]]  (Normal) Collected: 01/27/22 1125    Lab Status: Final result Specimen: Blood from Arm, Right Updated: 01/27/22 1203     hs TnI 0hr 5 ng/L     Lipase [[id number]]  (Abnormal) Collected: 01/27/22 1125    Lab Status: Final result Specimen: Blood from Arm, Right Updated: 01/27/22 1200     Lipase 23 u/L     CMP [[id number]] Collected: 01/27/22 1125    Lab Status: Final result Specimen: Blood from Arm, Right Updated: 01/27/22 1200     Sodium 142 mmol/L      Potassium 3 9 mmol/L      Chloride 106 mmol/L      CO2 27 mmol/L      ANION GAP 9 mmol/L      BUN 17 mg/dL      Creatinine 1 29 mg/dL      Glucose 88 mg/dL      Calcium 9 1 mg/dL      AST 14 U/L      ALT 19 U/L      Alkaline Phosphatase 71 U/L      Total Protein 7 2 g/dL      Albumin 3 6 g/dL      Total Bilirubin 0 52 mg/dL      eGFR 57 ml/min/1 73sq m     Narrative:      [place] guidelines for Chronic Kidney Disease (CKD):     Stage 1 with normal or high GFR (GFR > 90 mL/min/1 73 square meters)    Stage 2 Mild CKD (GFR = 60-89 mL/min/1 73 square meters)    Stage 3A Moderate CKD (GFR = 45-59 mL/min/1 73 square meters)    Stage 3B Moderate CKD (GFR = 30-44 mL/min/1 73 square meters)    Stage 4 Severe CKD (GFR = 15-29 mL/min/1 73 square meters)    Stage 5 End Stage CKD (GFR <15 mL/min/1 73 square meters)  Note: GFR calculation is accurate only with a steady state creatinine    Magnesium [[id number]]  (Normal) Collected: 01/27/22 1125    Lab Status: Final result Specimen: Blood from Arm, Right Updated: 01/27/22 1200     Magnesium 2 2 mg/dL     Beta Hydroxybutyrate [[id number]]  (Normal) Collected: 01/27/22 1125    Lab Status: Final result Specimen: Blood from Arm, Right Updated: 01/27/22 1156     BETA-HYDROXYBUTYRATE 0 1 mmol/L     Fingerstick Glucose (POCT) [[id number]]  (Normal) Collected: 01/27/22 1143    Lab Status: Final result Updated: 01/27/22 1144     POC Glucose 79 mg/dl     Blood gas, venous [[id number]]  (Abnormal) Collected: 01/27/22 1125    Lab Status: Final result Specimen: Blood from Arm, Right Updated: 01/27/22 1139     pH, Nathan 7 402     pCO2, Nathan 39 2 mm Hg      pO2, Nathan 33 0 mm Hg      HCO3, Nathan 23 8 mmol/L      Base Excess, Nathan -0 7 mmol/L      O2 Content, Nathan 11 7 ml/dL      O2 HGB, VENOUS 59 3 %     CBC and differential [[id number]] Collected: 01/27/22 1125    Lab Status: Final result Specimen: Blood from Arm, Right Updated: 01/27/22 1135     WBC 7 62 Thousand/uL      RBC 4 38 Million/uL      Hemoglobin 13 7 g/dL      Hematocrit 39 5 %      MCV 90 fL      MCH 31 3 pg      MCHC 34 7 g/dL      RDW 12 8 %      MPV 10 2 fL      Platelets 162 Thousands/uL      nRBC 0 /100 WBCs      Neutrophils Relative 71 %      Immat GRANS % 1 %      Lymphocytes Relative 17 %      Monocytes Relative 9 %      Eosinophils Relative 2 %      Basophils Relative 0 %      Neutrophils Absolute 5 42 Thousands/µL      Immature Grans Absolute 0 05 Thousand/uL      Lymphocytes Absolute 1 29 Thousands/µL      Monocytes Absolute 0 70 Thousand/µL      Eosinophils Absolute 0 14 Thousand/µL      Basophils Absolute 0 02 Thousands/µL                  CT pe study w abdomen pelvis w contrast   Final Result by Emily Crow MD (01/27 1328)      No pulmonary embolus  Thickening of the wall of the mid and distal esophagus with mild mucosal enhancement and mild fluid distention suspicious for esophagitis possibly due to gastroesophageal reflux  Very large bolus of stool in the rectum with associated stercoral proctitis and extensive mass effect flattening the posterior margins of the urinary bladder and prostate, and probably accounting for mild fullness of renal collecting systems bilaterally  Findings are consistent with profound constipation and probable fecal impaction  Nonspecific mixed sclerotic and lucent densities in vertebral bodies throughout the spine, in the left acetabulum, and in the right femoral neck  Correlate for possible known history of osseous involvement of Langerhans Cell Histiocytosis     If the    patient does not have a known diagnosis that explains multifocal osseous lesions, follow-up bone scan would be recommended  This examination was marked "immediate notification" in Epic in order to begin the standard process by which the radiology reading room liaison alerts the referring practitioner  Workstation performed: [id number]         XR chest 1 view portable   ED Interpretation by Angela Wu PA-C (01/27 1241)   No acute cardiopulmonary abnormality noted      Final Result by Jeremias Gilbert MD (01/27 1340)      Linear atelectasis in the left lower lobe  Otherwise, no active pulmonary disease  Workstation performed: [id number]                    Procedures  Procedures         ED Course  ED Course as of 01/29/22 0737   Thu Jan 27, 2022   1107 EKG:Normal sinus rhythm Nonspecific T wave abnormality  Abnormal ECG  When compared with ECG of 29-APR-2021 22:55,  Nonspecific T wave abnormality now evident in Anterolateral leads   1330 CT pe/abd/pelvis: IMPRESSION:     No pulmonary embolus      Thickening of the wall of the mid and distal esophagus with mild mucosal enhancement and mild fluid distention suspicious for esophagitis possibly due to gastroesophageal reflux      Very large bolus of stool in the rectum with associated stercoral proctitis and extensive mass effect flattening the posterior margins of the urinary bladder and prostate, and probably accounting for mild fullness of renal collecting systems bilaterally  Findings are consistent with profound constipation and probable fecal impaction      Nonspecific mixed sclerotic and lucent densities in vertebral bodies throughout the spine, in the left acetabulum, and in the right femoral neck  Correlate for possible known history of osseous involvement of Langerhans Cell Histiocytosis  If the   patient does not have a known diagnosis that explains multifocal osseous lesions, follow-up bone scan would be recommended      Will order enema/clean out   0799 Cxr:  IMPRESSION:     Linear atelectasis in the left lower lobe      Otherwise, no active pulmonary disease  0 Disimpaction completed and successful                               SBIRT 22yo+      Most Recent Value   SBIRT (22 yo +)    In order to provide better care to our patients, we are screening all of our patients for alcohol and drug use  Would it be okay to ask you these screening questions? Unable to answer at this time Filed at: 01/27/2022 1110                    MDM  Number of Diagnoses or Management Options  Constipation: established and worsening  Esophagitis: established and worsening     Amount and/or Complexity of Data Reviewed  Clinical lab tests: ordered and reviewed  Tests in the radiology section of CPT®: ordered and reviewed  Independent visualization of images, tracings, or specimens: yes    Risk of Complications, Morbidity, and/or Mortality  Presenting problems: moderate  Diagnostic procedures: moderate  Management options: moderate    Patient Progress  Patient progress: improved      Disposition  Final diagnoses:   Esophagitis   Constipation     Time reflects when diagnosis was documented in both MDM as applicable and the Disposition within this note     Time User Action Codes Description Comment    1/27/2022  2:14 PM Narvis Cos Add [K20 90] Esophagitis     1/27/2022  2:14 PM Narvis Cos Add [K59 00] Constipation       ED Disposition     ED Disposition Condition Date/Time Comment    Discharge Stable Thu Jan 27, 2022  3:31 PM Paula Reasonroseline discharge to home/self care              Follow-up Information    None         Discharge Medication List as of 1/27/2022  3:34 PM      START taking these medications    Details   docusate sodium (COLACE) 100 mg capsule Take 1 capsule (100 mg total) by mouth every 12 (twelve) hours, Starting Thu 1/27/2022, Normal      pantoprazole (PROTONIX) 20 mg tablet Take 2 tablets (40 mg total) by mouth daily, Starting Thu 1/27/2022, Normal      !! polyethylene glycol (MIRALAX) 17 g packet Take 17 g by mouth daily, Starting u 1/27/2022, Normal       !! - Potential duplicate medications found  Please discuss with provider  CONTINUE these medications which have NOT CHANGED    Details   apixaban (Eliquis) 5 mg Take 5 mg by mouth 2 (two) times a day, Historical Med      atorvastatin (LIPITOR) 20 mg tablet Take 20 mg by mouth daily, Historical Med      Calcium Carbonate-Vitamin D 600-200 MG-UNIT CAPS Take 1 tablet by mouth 2 (two) times a day, Historical Med      Dextrose, Diabetic Use, 15 GM/33GM GEL Take 1 packet by mouth, Starting Tue 11/20/2018, Historical Med      dorzolamide-timolol (COSOPT) 22 3-6 8 MG/ML ophthalmic solution Administer 1 drop to the right eye 2 (two) times a day, Historical Med      insulin aspart (NovoLOG FlexPen) 100 UNIT/ML injection pen Inject 5 Units under the skin 3 (three) times a day with meals, Starting Mon 5/10/2021, No Print      insulin glargine (Lantus SoloStar) 100 units/mL injection pen Inject 25 Units under the skin daily, Starting Mon 5/10/2021, No Print      midodrine (PROAMATINE) 5 mg tablet Take 5 mg by mouth 3 (three) times a day, Starting Fri 1/8/2021, Historical Med      Pramoxine-Calamine (AVEENO ANTI-ITCH EX) Apply topically, Historical Med      insulin lispro (HumaLOG) 100 units/mL injection Inject 1-6 Units under the skin 3 (three) times a day before meals Blood Glucose 150 - 189: 1 Unit of Insulin  Blood Glucose 190 - 229: 2 Units of Insulin  Blood Glucose 230 - 269: 3 Units of Insulin  Blood Glucose 270 - 309: 4 Units of Insulin  Blood Gl ucose 310 - 349: 5 Units of Insulin  Blood Glucose greater than or equal to 350: 6 Units of Insulin, Starting Mon 5/10/2021, No Print      ! ! polyethylene glycol (MIRALAX) 17 g packet Take 17 g by mouth daily, Historical Med      Sennosides-Docusate Sodium (SENNA PLUS PO) Take by mouth, Historical Med      simvastatin (ZOCOR) 20 mg tablet Take 20 mg by mouth every evening, Starting Fri 5/17/2019, Until Sat 5/16/2020, Historical Med       !! - Potential duplicate medications found  Please discuss with provider  No discharge procedures on file      PDMP Review     None          ED Provider  Electronically Signed by           Veronica Wagner PA-C  01/29/22 0466 no

## 2022-01-27 NOTE — ED NOTES
Above and beyond made aware patient being transported home around [facility]  01/27/22 [address] [place] [address]

## 2022-01-27 NOTE — DISCHARGE INSTRUCTIONS
Results for orders placed or performed during the hospital encounter of 01/27/22   CBC and differential   Result Value Ref Range    WBC 7 62 4 31 - 10 16 Thousand/uL    RBC 4 38 3 88 - 5 62 Million/uL    Hemoglobin 13 7 12 0 - 17 0 g/dL    Hematocrit 39 5 36 5 - 49 3 %    MCV 90 82 - 98 fL    MCH 31 3 26 8 - 34 3 pg    MCHC 34 7 31 4 - 37 4 g/dL    RDW 12 8 11 6 - 15 1 %    MPV 10 2 8 9 - 12 7 fL    Platelets 125 819 - 263 Thousands/uL    nRBC 0 /100 WBCs    Neutrophils Relative 71 43 - 75 %    Immat GRANS % 1 0 - 2 %    Lymphocytes Relative 17 14 - 44 %    Monocytes Relative 9 4 - 12 %    Eosinophils Relative 2 0 - 6 %    Basophils Relative 0 0 - 1 %    Neutrophils Absolute 5 42 1 85 - 7 62 Thousands/µL    Immature Grans Absolute 0 05 0 00 - 0 20 Thousand/uL    Lymphocytes Absolute 1 29 0 60 - 4 47 Thousands/µL    Monocytes Absolute 0 70 0 17 - 1 22 Thousand/µL    Eosinophils Absolute 0 14 0 00 - 0 61 Thousand/µL    Basophils Absolute 0 02 0 00 - 0 10 Thousands/µL   CMP   Result Value Ref Range    Sodium 142 136 - 145 mmol/L    Potassium 3 9 3 5 - 5 3 mmol/L    Chloride 106 100 - 108 mmol/L    CO2 27 21 - 32 mmol/L    ANION GAP 9 4 - 13 mmol/L    BUN 17 5 - 25 mg/dL    Creatinine 1 29 0 60 - 1 30 mg/dL    Glucose 88 65 - 140 mg/dL    Calcium 9 1 8 3 - 10 1 mg/dL    AST 14 5 - 45 U/L    ALT 19 12 - 78 U/L    Alkaline Phosphatase 71 46 - 116 U/L    Total Protein 7 2 6 4 - 8 2 g/dL    Albumin 3 6 3 5 - 5 0 g/dL    Total Bilirubin 0 52 0 20 - 1 00 mg/dL    eGFR 57 ml/min/1 73sq m   Lipase   Result Value Ref Range    Lipase 23 (L) 73 - 393 u/L   Magnesium   Result Value Ref Range    Magnesium 2 2 1 6 - 2 6 mg/dL   Lactic acid   Result Value Ref Range    LACTIC ACID 1 7 0 5 - 2 0 mmol/L   HS Troponin 0hr (reflex protocol)   Result Value Ref Range    hs TnI 0hr 5 "Refer to ACS Flowchart"- see link ng/L   Blood gas, venous   Result Value Ref Range    pH, Nathan 7 402 (H) 7 300 - 7 400    pCO2, Nathan 39 2 (L) 42 0 - 50 0 mm Hg pO2, Nathan 33 0 (L) 35 0 - 45 0 mm Hg    HCO3, Nathan 23 8 (L) 24 - 30 mmol/L    Base Excess, Nathan -0 7 mmol/L    O2 Content, Nathan 11 7 ml/dL    O2 HGB, VENOUS 59 3 (L) 60 0 - 80 0 %   Beta Hydroxybutyrate   Result Value Ref Range    BETA-HYDROXYBUTYRATE 0 1 <0 6 mmol/L   ECG 12 lead   Result Value Ref Range    Ventricular Rate 82 BPM    Atrial Rate 88 BPM    MA Interval  ms    QRSD Interval 76 ms    QT Interval 346 ms    QTC Interval 404 ms    P Axis  degrees    QRS Axis 8 degrees    T Wave Axis 52 degrees   Type and screen   Result Value Ref Range    ABO Grouping O     Rh Factor Positive     Antibody Screen Negative     Specimen Expiration Date [id number]    Fingerstick Glucose (POCT)   Result Value Ref Range    POC Glucose 79 65 - 140 mg/dl   Urine Macroscopic, POC   Result Value Ref Range    Color, UA Yellow     Clarity, UA Clear     pH, UA 6 0 4 5 - 8 0    Leukocytes, UA Negative Negative    Nitrite, UA Negative Negative    Protein, UA Negative Negative mg/dl    Glucose,  (1/10%) (A) Negative mg/dl    Ketones, UA 15 (1+) (A) Negative mg/dl    Urobilinogen, UA 0 2 0 2, 1 0 E U /dl E U /dl    Bilirubin, UA Negative Negative    Blood, UA Negative Negative    Specific Gravity, UA 1 010 1 003 - 1 030     CT pe study w abdomen pelvis w contrast   Final Result      No pulmonary embolus  Thickening of the wall of the mid and distal esophagus with mild mucosal enhancement and mild fluid distention suspicious for esophagitis possibly due to gastroesophageal reflux  Very large bolus of stool in the rectum with associated stercoral proctitis and extensive mass effect flattening the posterior margins of the urinary bladder and prostate, and probably accounting for mild fullness of renal collecting systems bilaterally  Findings are consistent with profound constipation and probable fecal impaction        Nonspecific mixed sclerotic and lucent densities in vertebral bodies throughout the spine, in the left acetabulum, and in the right femoral neck  Correlate for possible known history of osseous involvement of Langerhans Cell Histiocytosis  If the    patient does not have a known diagnosis that explains multifocal osseous lesions, follow-up bone scan would be recommended  This examination was marked "immediate notification" in Epic in order to begin the standard process by which the radiology reading room liaison alerts the referring practitioner  Workstation performed: [id number]         XR chest 1 view portable   ED Interpretation   No acute cardiopulmonary abnormality noted      Final Result      Linear atelectasis in the left lower lobe  Otherwise, no active pulmonary disease                    Workstation performed: [id number]

## 2022-06-03 ENCOUNTER — HOSPITAL ENCOUNTER (EMERGENCY)
Facility: HOSPITAL | Age: 67
Discharge: NURSING, REHAB OR LONG-TERM CARE | End: 2022-06-03
Attending: EMERGENCY MEDICINE
Payer: COMMERCIAL

## 2022-06-03 VITALS
OXYGEN SATURATION: 99 % | RESPIRATION RATE: 18 BRPM | TEMPERATURE: 98 F | SYSTOLIC BLOOD PRESSURE: 149 MMHG | HEART RATE: 90 BPM | DIASTOLIC BLOOD PRESSURE: 100 MMHG

## 2022-06-03 DIAGNOSIS — E16.2 HYPOGLYCEMIA: Primary | ICD-10-CM

## 2022-06-03 LAB
ANION GAP SERPL CALCULATED.3IONS-SCNC: 10 MMOL/L (ref 4–13)
ATRIAL RATE: 75 BPM
BASOPHILS # BLD AUTO: 0.02 THOUSANDS/ΜL (ref 0–0.1)
BASOPHILS NFR BLD AUTO: 0 % (ref 0–1)
BUN SERPL-MCNC: 16 MG/DL (ref 5–25)
CALCIUM SERPL-MCNC: 9.1 MG/DL (ref 8.3–10.1)
CHLORIDE SERPL-SCNC: 104 MMOL/L (ref 100–108)
CO2 SERPL-SCNC: 30 MMOL/L (ref 21–32)
CREAT SERPL-MCNC: 1.24 MG/DL (ref 0.6–1.3)
EOSINOPHIL # BLD AUTO: 0.24 THOUSAND/ΜL (ref 0–0.61)
EOSINOPHIL NFR BLD AUTO: 5 % (ref 0–6)
ERYTHROCYTE [DISTWIDTH] IN BLOOD BY AUTOMATED COUNT: 12.6 % (ref 11.6–15.1)
GFR SERPL CREATININE-BSD FRML MDRD: 60 ML/MIN/1.73SQ M
GLUCOSE SERPL-MCNC: 104 MG/DL (ref 65–140)
GLUCOSE SERPL-MCNC: 285 MG/DL (ref 65–140)
GLUCOSE SERPL-MCNC: 339 MG/DL (ref 65–140)
GLUCOSE SERPL-MCNC: 93 MG/DL (ref 65–140)
HCT VFR BLD AUTO: 38.7 % (ref 36.5–49.3)
HGB BLD-MCNC: 13.1 G/DL (ref 12–17)
IMM GRANULOCYTES # BLD AUTO: 0.02 THOUSAND/UL (ref 0–0.2)
IMM GRANULOCYTES NFR BLD AUTO: 0 % (ref 0–2)
LYMPHOCYTES # BLD AUTO: 1.03 THOUSANDS/ΜL (ref 0.6–4.47)
LYMPHOCYTES NFR BLD AUTO: 21 % (ref 14–44)
MCH RBC QN AUTO: 31.1 PG (ref 26.8–34.3)
MCHC RBC AUTO-ENTMCNC: 33.9 G/DL (ref 31.4–37.4)
MCV RBC AUTO: 92 FL (ref 82–98)
MONOCYTES # BLD AUTO: 0.46 THOUSAND/ΜL (ref 0.17–1.22)
MONOCYTES NFR BLD AUTO: 9 % (ref 4–12)
NEUTROPHILS # BLD AUTO: 3.24 THOUSANDS/ΜL (ref 1.85–7.62)
NEUTS SEG NFR BLD AUTO: 65 % (ref 43–75)
NRBC BLD AUTO-RTO: 0 /100 WBCS
P AXIS: 46 DEGREES
PLATELET # BLD AUTO: 206 THOUSANDS/UL (ref 149–390)
PMV BLD AUTO: 10.6 FL (ref 8.9–12.7)
POTASSIUM SERPL-SCNC: 3.8 MMOL/L (ref 3.5–5.3)
PR INTERVAL: 168 MS
QRS AXIS: 8 DEGREES
QRSD INTERVAL: 88 MS
QT INTERVAL: 370 MS
QTC INTERVAL: 413 MS
RBC # BLD AUTO: 4.21 MILLION/UL (ref 3.88–5.62)
SODIUM SERPL-SCNC: 144 MMOL/L (ref 136–145)
T WAVE AXIS: 17 DEGREES
VENTRICULAR RATE: 75 BPM
WBC # BLD AUTO: 5.01 THOUSAND/UL (ref 4.31–10.16)

## 2022-06-03 PROCEDURE — 99284 EMERGENCY DEPT VISIT MOD MDM: CPT | Performed by: EMERGENCY MEDICINE

## 2022-06-03 PROCEDURE — 85025 COMPLETE CBC W/AUTO DIFF WBC: CPT | Performed by: EMERGENCY MEDICINE

## 2022-06-03 PROCEDURE — 82948 REAGENT STRIP/BLOOD GLUCOSE: CPT

## 2022-06-03 PROCEDURE — 93010 ELECTROCARDIOGRAM REPORT: CPT | Performed by: INTERNAL MEDICINE

## 2022-06-03 PROCEDURE — 36415 COLL VENOUS BLD VENIPUNCTURE: CPT | Performed by: EMERGENCY MEDICINE

## 2022-06-03 PROCEDURE — 80048 BASIC METABOLIC PNL TOTAL CA: CPT | Performed by: EMERGENCY MEDICINE

## 2022-06-03 PROCEDURE — 99285 EMERGENCY DEPT VISIT HI MDM: CPT

## 2022-06-03 PROCEDURE — 93005 ELECTROCARDIOGRAM TRACING: CPT

## 2022-06-03 NOTE — ED PROVIDER NOTES
History  Chief Complaint   Patient presents with    Hypoglycemia - Symptomatic     Presents to ED via [facility] from [facility] at [facility] after a hypoglycemic episode  Staff attempted oral glucose replacement with little success  EMS treated patient with D10% and his BGL and mental status improved  Patient now JUÁREZ  Hx IDDM  31-year-old male, presenting from skilled nursing facility with hypoglycemia  Patient reported to be hypoglycemic at facility, they were unable to get his glucose up, EMS called and patient received D10 with improvement in mental status  Patient currently awake and alert, denies any complaints, states he is hungry and would like to eat  Patient states that he has had similar episodes in the past       History provided by:  Patient and EMS personnel   used: No    Hypoglycemia - Symptomatic  Onset quality:  Gradual  Timing:  Constant  Progression:  Unchanged  Chronicity:  Recurrent  Diabetic status:  Controlled with insulin  Relieved by:  IV glucose  Ineffective treatments:  None tried  Associated symptoms: no shortness of breath and no vomiting        Prior to Admission Medications   Prescriptions Last Dose Informant Patient Reported? Taking?    Calcium Carbonate-Vitamin D 600-200 MG-UNIT CAPS   Yes No   Sig: Take 1 tablet by mouth 2 (two) times a day   Dextrose, Diabetic Use, 15 GM/33GM GEL   Yes No   Sig: Take 1 packet by mouth   Pramoxine-Calamine (AVEENO ANTI-ITCH EX)   Yes No   Sig: Apply topically   Sennosides-Docusate Sodium (SENNA PLUS PO)   Yes No   Sig: Take by mouth   Patient not taking: Reported on 1/27/2022    apixaban (Eliquis) 5 mg   Yes No   Sig: Take 5 mg by mouth 2 (two) times a day   atorvastatin (LIPITOR) 20 mg tablet   Yes No   Sig: Take 20 mg by mouth daily   docusate sodium (COLACE) 100 mg capsule   No No   Sig: Take 1 capsule (100 mg total) by mouth every 12 (twelve) hours   dorzolamide-timolol (COSOPT) 22 3-6 8 MG/ML ophthalmic solution Yes No   Sig: Administer 1 drop to the right eye 2 (two) times a day   insulin aspart (NovoLOG FlexPen) 100 UNIT/ML injection pen   No No   Sig: Inject 5 Units under the skin 3 (three) times a day with meals   insulin glargine (Lantus SoloStar) 100 units/mL injection pen   No No   Sig: Inject 25 Units under the skin daily   Patient taking differently: Inject 28 Units under the skin daily     insulin lispro (HumaLOG) 100 units/mL injection   No No   Sig: Inject 1-6 Units under the skin 3 (three) times a day before meals Blood Glucose 150 - 189: 1 Unit of Insulin  Blood Glucose 190 - 229: 2 Units of Insulin  Blood Glucose 230 - 269: 3 Units of Insulin  Blood Glucose 270 - 309: 4 Units of Insulin  Blood Glucose 310 - 349: 5 Units of Insulin  Blood Glucose greater than or equal to 350: 6 Units of Insulin   Patient not taking: Reported on 1/27/2022    midodrine (PROAMATINE) 5 mg tablet   Yes No   Sig: Take 5 mg by mouth 3 (three) times a day   pantoprazole (PROTONIX) 20 mg tablet   No No   Sig: Take 2 tablets (40 mg total) by mouth daily   polyethylene glycol (MIRALAX) 17 g packet   Yes No   Sig: Take 17 g by mouth daily   Patient not taking: Reported on 1/27/2022    polyethylene glycol (MIRALAX) 17 g packet   No No   Sig: Take 17 g by mouth daily   simvastatin (ZOCOR) 20 mg tablet   Yes No   Sig: Take 20 mg by mouth every evening      Facility-Administered Medications: None       Past Medical History:   Diagnosis Date    Acute kidney injury ([facility] )     Dehydration     Diabetes mellitus ([facility] )     Type 1    Lymphoma ([facility] )        History reviewed  No pertinent surgical history  History reviewed  No pertinent family history  I have reviewed and agree with the history as documented      E-Cigarette/Vaping    E-Cigarette Use Never User      E-Cigarette/Vaping Substances    Nicotine No     THC No     CBD No     Flavoring No     Other No     Unknown No      Social History     Tobacco Use    Smoking status: Never Smoker    Smokeless tobacco: Never Used   Vaping Use    Vaping Use: Never used   Substance Use Topics    Alcohol use: Not Currently    Drug use: Never       Review of Systems   Constitutional: Negative  Negative for fever  HENT: Negative  Eyes: Negative  Respiratory: Negative  Negative for shortness of breath  Cardiovascular: Negative  Gastrointestinal: Negative  Negative for nausea and vomiting  Musculoskeletal: Negative  Skin: Negative  Neurological: Negative for headaches  Hematological: Negative  Physical Exam  Physical Exam  Vitals and nursing note reviewed  Constitutional:       General: He is not in acute distress  HENT:      Head: Normocephalic and atraumatic  Eyes:      Extraocular Movements: Extraocular movements intact  Pupils: Pupils are equal, round, and reactive to light  Cardiovascular:      Rate and Rhythm: Normal rate and regular rhythm  Pulmonary:      Effort: Pulmonary effort is normal       Breath sounds: Normal breath sounds  Abdominal:      Palpations: Abdomen is soft  Tenderness: There is no abdominal tenderness  Musculoskeletal:         General: Normal range of motion  Skin:     General: Skin is warm and dry  Neurological:      General: No focal deficit present  Mental Status: He is alert and oriented to person, place, and time           Vital Signs  ED Triage Vitals [06/03/22 1830]   Temperature Pulse Respirations Blood Pressure SpO2   98 °F (36 7 °C) 78 18 167/86 96 %      Temp src Heart Rate Source Patient Position - Orthostatic VS BP Location FiO2 (%)   -- Monitor Lying Right arm --      Pain Score       No Pain           Vitals:    06/03/22 1830 06/03/22 2107   BP: 167/86 154/78   Pulse: 78 82   Patient Position - Orthostatic VS: Lying Lying         Visual Acuity      ED Medications  Medications - No data to display    Diagnostic Studies  Results Reviewed     Procedure Component Value Units Date/Time    Fingerstick Glucose (POCT) [[id number]]  (Abnormal) Collected: 06/03/22 2106    Lab Status: Final result Updated: 06/03/22 2112     POC Glucose 285 mg/dl     Basic metabolic panel [[id number]] Collected: 06/03/22 1908    Lab Status: Final result Specimen: Blood from Arm, Left Updated: 06/03/22 1936     Sodium 144 mmol/L      Potassium 3 8 mmol/L      Chloride 104 mmol/L      CO2 30 mmol/L      ANION GAP 10 mmol/L      BUN 16 mg/dL      Creatinine 1 24 mg/dL      Glucose 93 mg/dL      Calcium 9 1 mg/dL      eGFR 60 ml/min/1 73sq m     Narrative:      [place] guidelines for Chronic Kidney Disease (CKD):     Stage 1 with normal or high GFR (GFR > 90 mL/min/1 73 square meters)    Stage 2 Mild CKD (GFR = 60-89 mL/min/1 73 square meters)    Stage 3A Moderate CKD (GFR = 45-59 mL/min/1 73 square meters)    Stage 3B Moderate CKD (GFR = 30-44 mL/min/1 73 square meters)    Stage 4 Severe CKD (GFR = 15-29 mL/min/1 73 square meters)    Stage 5 End Stage CKD (GFR <15 mL/min/1 73 square meters)  Note: GFR calculation is accurate only with a steady state creatinine    CBC and differential [[id number]] Collected: 06/03/22 1908    Lab Status: Final result Specimen: Blood from Arm, Left Updated: 06/03/22 1922     WBC 5 01 Thousand/uL      RBC 4 21 Million/uL      Hemoglobin 13 1 g/dL      Hematocrit 38 7 %      MCV 92 fL      MCH 31 1 pg      MCHC 33 9 g/dL      RDW 12 6 %      MPV 10 6 fL      Platelets 949 Thousands/uL      nRBC 0 /100 WBCs      Neutrophils Relative 65 %      Immat GRANS % 0 %      Lymphocytes Relative 21 %      Monocytes Relative 9 %      Eosinophils Relative 5 %      Basophils Relative 0 %      Neutrophils Absolute 3 24 Thousands/µL      Immature Grans Absolute 0 02 Thousand/uL      Lymphocytes Absolute 1 03 Thousands/µL      Monocytes Absolute 0 46 Thousand/µL      Eosinophils Absolute 0 24 Thousand/µL      Basophils Absolute 0 02 Thousands/µL     Fingerstick Glucose (POCT) [[id number]]  (Normal) Collected: 06/03/22 1823    Lab Status: Final result Updated: 06/03/22 1824     POC Glucose 104 mg/dl                  No orders to display              Procedures  Procedures         ED Course  ED Course as of 06/03/22 2131 Fri Jun 03, 2022 2131 Patient ate food in ED, has been awake alert with normal mental status and no complaints since arrival   Patient with no further hypoglycemia, will be discharged back to skilled nursing facility  MDM  Number of Diagnoses or Management Options  Diagnosis management comments: 56-year-old male, presents from skilled nursing facility with reported hyperglycemia  Differential diagnosis includes hyperglycemia, dehydration among other diagnosis  Patient currently awake and alert and requesting the eat, glucose on arrival noted to be normal   Labs ordered, will feed in ED, continue to monitor  Amount and/or Complexity of Data Reviewed  Clinical lab tests: ordered        Disposition  Final diagnoses:   Hypoglycemia     Time reflects when diagnosis was documented in both MDM as applicable and the Disposition within this note     Time User Action Codes Description Comment    6/3/2022  9:26 PM Mikey Lora Add [E16 2] Hypoglycemia       ED Disposition     ED Disposition   Discharge    Condition   Stable    Date/Time   Fri Luke 3, 2022  9:27 PM    Comment   Emilee Paulson discharge to SNF  Follow-up Information    None         Patient's Medications   Discharge Prescriptions    No medications on file       No discharge procedures on file      PDMP Review     None          ED Provider  Electronically Signed by           Selam Biggs MD  06/03/22 2131

## 2022-06-04 NOTE — ED NOTES
Spoke with physician director at [facility] [employer], she relayed concerns regarding some possible seizure activity  This was discussed with ED attending who advises outpatient workup  This was relayed to [facility] who will accept patient back with his sister transporting POV        Salvador Guzman RN  06/03/22 4932

## 2022-06-04 NOTE — ED NOTES
Patient's sister was notified of patient status and will be coming in to ED to transport patient back to his assisted living facility        Diann Gamboa RN  06/03/22 4139

## 2022-10-12 PROBLEM — E11.10 DKA (DIABETIC KETOACIDOSIS) (HCC): Status: RESOLVED | Noted: 2019-12-13 | Resolved: 2022-10-12

## 2022-10-21 ENCOUNTER — TELEPHONE (OUTPATIENT)
Dept: OTHER | Facility: OTHER | Age: 67
End: 2022-10-21

## 2022-10-21 NOTE — TELEPHONE ENCOUNTER
S/w Mila Rahman stated patient is new admission and needs to review orders and needs to speak to on call

## 2022-10-24 ENCOUNTER — NURSING HOME VISIT (OUTPATIENT)
Dept: GERIATRICS | Facility: OTHER | Age: 67
End: 2022-10-24

## 2022-10-24 DIAGNOSIS — Z86.718 HISTORY OF DVT (DEEP VEIN THROMBOSIS): ICD-10-CM

## 2022-10-24 DIAGNOSIS — I21.4 NSTEMI (NON-ST ELEVATED MYOCARDIAL INFARCTION) (HCC): ICD-10-CM

## 2022-10-24 DIAGNOSIS — C96.6 LANGERHAN'S CELL HISTIOCYTOSIS (HCC): ICD-10-CM

## 2022-10-24 DIAGNOSIS — N18.2 TYPE 1 DIABETES MELLITUS WITH STAGE 2 CHRONIC KIDNEY DISEASE (HCC): Primary | ICD-10-CM

## 2022-10-24 DIAGNOSIS — I73.9 PERIPHERAL VASCULAR DISEASE, UNSPECIFIED (HCC): ICD-10-CM

## 2022-10-24 DIAGNOSIS — E10.22 TYPE 1 DIABETES MELLITUS WITH STAGE 2 CHRONIC KIDNEY DISEASE (HCC): Primary | ICD-10-CM

## 2022-10-24 PROBLEM — R33.9 URINARY RETENTION: Status: ACTIVE | Noted: 2018-08-29

## 2022-10-24 PROBLEM — K59.09 OTHER CONSTIPATION: Status: ACTIVE | Noted: 2018-08-29

## 2022-10-24 PROBLEM — E87.1 HYPONATREMIA: Status: ACTIVE | Noted: 2022-10-18

## 2022-10-24 PROBLEM — I25.10 CORONARY ARTERY DISEASE WITHOUT ANGINA PECTORIS: Status: ACTIVE | Noted: 2022-10-20

## 2022-10-25 NOTE — PROGRESS NOTES
[facility]  [address] [place] [address], [address] [place] [address]  [address] [place] [address]    Nursing Home Admission    NAME: Priscilla Springer  AGE: 78 y o  SEX: male [id number]    DATE OF ENCOUNTER: 10/24/2022; pt seen at 11 am on 10/24/2022    Assessment/Plan:   Patient’s care was coordinated with nursing facility staff  Recent vitals, labs and updated medications were reviewed on [facility]  Past Medical, surgical, social, medication and allergy history and patient’s previous records reviewed  1  Type 1 diabetes mellitus with stage 2 chronic kidney disease ([facility] 75 )     2  NSTEMI (non-ST elevated myocardial infarction) ([facility] 75 )     3  History of DVT (deep vein thrombosis)     4  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[facility] )     5  Peripheral vascular disease, unspecified ([place] [address] )        S/p DKA  DM1  Last Hga1c 8 9 7/2022  Pt with CKD & labile sugars  Episodes of hypoglycemia so lantus reduced to 20 units on dc from hospital  Sugars here uncontrolled  Increase aspart to 10 units SQ qac; hold if sugar <150  Pt on carb controlled diet  F/u outpatient with [facility] endocrinology    S/p NSTEMI  Echo ok  Abn stress test  Pt declined cardiac cath  Cont aspirin 81 mg 1 tab po daily  Cont atorvastatin 80 mg 1 tab po QHS    CKDII  Baseline Cr 1 1-1 2  Pt had acute renal failure as inpt when in dka; now resolved  Last Cr 0 8  Check labs this week    Hx unprovoked DVT  Cont eliquis 2 5 mg po BID    Langerhans cell histiocytosis  F/u outpatient with oncology    PVD  Cont aspirin 81 mg 1 tab po daily  Cont atorvastatin 80 mg 1 tab po daily      Chief Complaint     High sugars    HPI   Patient is a 78 y o  male with PMH DM1, PVD, Orthostatic Hypotension, DVT (unprovoked), CAD, CKD  Pt admitted from 10/17 to 10/21 at [address] [place] [address] [address] [facility] for DKA  Pt also had elevated troponin and lexiscan done which revealed moderate sonam-infarct ischemia  Pt with CKD and declined the cardiac cath   He was started on ASA 81 mg 1 tab po daily and lipitor 80 mg 1 tab po QHS  Pt had hx of orthostatic hypotension so ace inhibitor and beta blocker contraindicated  Pt received covid booster shot & flu shot  Pt seen and examined today  Pt feels fine  Pt here for therapy  Past Medical History:   Diagnosis Date   • Acute kidney injury ([place] [address] )    • Dehydration    • Diabetes mellitus ([place] [address] )     Type 1   • Lymphoma ([place] [address] )    From Care Everywhere  Past Medical History:   Diagnosis Date   Diabetes mellitus type I ([place] [address] )   Hyperlipidemia   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[place] [address] )   Lymphoma ([place] [address] )   PVD (peripheral vascular disease) ([place] [address] )   Rash 09/08/2017     Family History:  Family History   Problem Relation Age of Onset   Cancer Mother   Bladder cancer Mother   Ulcers Father   Heart disease Father   Seizures Son     Social History  Social History     Socioeconomic History   Marital status: Legally    Number of children: 1   Years of education: 12   Tobacco Use   Smoking status: Never   Smokeless tobacco: Never   Vaping Use   Vaping Use: Never used   Substance and Sexual Activity   Alcohol use: No   Drug use: No     Social History Narrative   " and living at [facility]  Just moved there a week ago    Disabled from [address] job at [employer]  Has a son Miranda Shaffer age 32 who lives with his grandparents  He sees his son infrequently  He has a brother Claudio Magdaleno and a sister Marck Gutierrez who lives nearby and is very supportive emotionally and financially at present He is independent with his ADLs but despite [facility] education and prompting patient still not adherent to diet and insulin regimen, hence the need for assisted living with 24/7 nursing assessment  He has food stamps and social security income  He draws his emotional and spiritual support from his family and a few friends  He does not have a living will   Will discuss at next visit  "    Martínez Partida  Eye surgery for retinopathy    Social History     Tobacco Use   Smoking Status Never Smoker   Smokeless Tobacco Never Used       No Known Allergies    Updated list was reviewed in [facility] system of facility  There were no vitals filed for this visit  Vital signs were reviewed in [facility]  498 3 Lbs 10/21/2022    Blood Pressure: 128 /  80 mmHg    Temperature: 98 0 °F    Pulse: 68 bpm    Respirations: 20 Breaths/min    Blood Sugar: 313 0 mg/dL    O2 Saturation: 98 0 %    Height: 70 0 Inches        Review of Systems   Constitutional: Negative for chills and fever  Respiratory: Negative for shortness of breath  Gastrointestinal: Negative for diarrhea, nausea and vomiting  Neurological: Negative for headaches  Physical Exam  Constitutional:       General: He is not in acute distress  Appearance: He is not ill-appearing  HENT:      Head: Normocephalic and atraumatic  Cardiovascular:      Rate and Rhythm: Normal rate and regular rhythm  Pulses:           Dorsalis pedis pulses are 2+ on the right side and 2+ on the left side  Pulmonary:      Effort: Pulmonary effort is normal  No respiratory distress  Breath sounds: No wheezing  Abdominal:      General: Abdomen is flat  Bowel sounds are normal  There is no distension  Palpations: Abdomen is soft  Tenderness: There is no abdominal tenderness  Feet:      Comments: Clean, dry, intact  Skin:     Comments: Legs - no cyanosis, clubbing, or edema   Neurological:      Mental Status: He is alert and oriented to person, place, and time  Psychiatric:         Mood and Affect: Mood normal          Behavior: Behavior normal        Diagnostic Data   Recent labs and imaging studies were reviewed      313 0 mg/dL      10/24/2022 16:30 313 0 mg/dL     10/24/2022 16:29 313 0 mg/dL     10/24/2022 13:03 316 0 mg/dL     10/24/2022 13:02 316 0 mg/dL     10/24/2022 12:59 316 0 mg/dL     10/24/2022 07:46 247 0 mg/dL     10/24/2022 07:45 247 0 mg/dL     10/24/2022 07:00 247 0 mg/dL     10/23/2022 16:42 150 0 mg/dL 10/23/2022 16:42 150 0 mg/dL     10/23/2022 14:09 351 0 mg/dL     10/23/2022 14:08 351 0 mg/dL     10/23/2022 08:15 135 0 mg/dL     10/23/2022 08:14 135 0 mg/dL     10/22/2022 16:34 372 0 mg/dL     10/22/2022 16:34 372 0 mg/dL     10/22/2022 11:38 371 0 mg/dL     10/22/2022 11:37 371 0 mg/dL     10/22/2022 11:36 245 0 mg/dL     10/22/2022 11:35 245 0 mg/dL     10/21/2022 17:51 285 0 mg/dL        Code Status:    CPR    Additional notes:     This note was electronically signed by Dr Enrique Max

## 2022-10-28 ENCOUNTER — NURSING HOME VISIT (OUTPATIENT)
Dept: GERIATRICS | Facility: OTHER | Age: 67
End: 2022-10-28

## 2022-10-28 DIAGNOSIS — I73.9 PERIPHERAL VASCULAR DISEASE, UNSPECIFIED (HCC): ICD-10-CM

## 2022-10-28 DIAGNOSIS — I21.4 NSTEMI (NON-ST ELEVATED MYOCARDIAL INFARCTION) (HCC): Primary | ICD-10-CM

## 2022-10-28 DIAGNOSIS — E10.22 TYPE 1 DIABETES MELLITUS WITH STAGE 2 CHRONIC KIDNEY DISEASE (HCC): ICD-10-CM

## 2022-10-28 DIAGNOSIS — N18.2 TYPE 1 DIABETES MELLITUS WITH STAGE 2 CHRONIC KIDNEY DISEASE (HCC): ICD-10-CM

## 2022-10-28 DIAGNOSIS — Z86.718 HISTORY OF DVT (DEEP VEIN THROMBOSIS): ICD-10-CM

## 2022-10-28 NOTE — PROGRESS NOTES
[facility]  [address]  [phone]  [facility]  [address]      NAME: Jose Carlos Ferrara  AGE: 78 y o  SEX: male [id number]    DATE OF ENCOUNTER: 10/28/2022    Assessment and Plan   S/p DKA  DM1  Last Hga1c 8 9 7/2022  Pt with CKD & labile sugars  Episodes of hypoglycemia when pt was in the hospital  Sugars here uncontrolled: 144-397  Changed aspart to high dose sliding scale before meals  Pt on carb controlled diet  F/u outpatient with [facility] endocrinology 12/20/22 @ 10:10am,      S/p NSTEMI  Echo ok  Abn stress test as inpatient  Pt declined cardiac cath  Cont aspirin 81 mg 1 tab po daily  Cont atorvastatin 80 mg 1 tab po QHS  10/31/22 f/u 745am [facility]    Langerhans cell histiocytosis  F/u outpatient with oncology     CKDII  Baseline Cr 1 1-1 2  Pt had acute renal failure as inpt when in [facility]ka; now resolved  Last Cr 0 8  Check labs this week     Hx unprovoked DVT  Cont eliquis 2 5 mg po BID     PVD  Pt has good b/l pedal pulse upon admission  Cont aspirin 81 mg 1 tab po daily  Cont atorvastatin 80 mg 1 tab po daily    Code status: CPR    Chief Complaint     Routine Long term follow-up visit  History of Present Illness   Patient is a 78 y o  male with PMH DM1, PVD, Orthostatic Hypotension, DVT (unprovoked), CAD, CKD  Pt admitted from 10/17 to 10/21 at [address] [facility] for DKA  Pt also had elevated troponin and lexiscan done which revealed moderate sonam-infarct ischemia  Pt with CKD and declined the cardiac cath  He was started on ASA 81 mg 1 tab po daily and lipitor 80 mg 1 tab po QHS  Pt had hx of orthostatic hypotension  Pt admitted to [facility] on 10/21/2022  Pt is a brittle diabetic  Pt seen and examined today as his sugars were uncontrolled yesterday  Started pt on high dose SSI instead of having 10 units premeal as he has occasional lower sugars  Pt is continued on lantus 23 units SQ qam  Pt has no nausea/vomiting  Pt asymptomatic      The following portions of the patient's history were reviewed and updated as appropriate: allergies, current medications, past family history, past medical history, past social history, past surgical history and problem list     Review of Systems     Review of Systems   Gastrointestinal: Negative for nausea and vomiting  Active Problem List     Patient Active Problem List   Diagnosis   • ROSANA (acute kidney injury) ([place] [address] )   • Ambulatory dysfunction   • Blurry vision, right eye   • Hyperlipidemia   • Hyperlipidemia associated with type 2 diabetes mellitus ([place] [address] )   • 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[place] [address] )   • Orthostatic hypotension   • Peripheral vascular disease, unspecified ([place] [address] )   • Diabetes mellitus type 1 ([place] [address] )   • Sinus tachycardia   • Anxiety   • Reactive depression   • Hypoglycemia associated with diabetes ([place] [address] )   • Coronary artery disease without angina pectoris   • Acute kidney injury superimposed on chronic kidney disease ([place] [address] )   • History of DVT (deep vein thrombosis)   • Hyperlipidemia due to type 1 diabetes mellitus ([place] [address] )   • Hyponatremia   • NSTEMI (non-ST elevated myocardial infarction) ([place] [address] )   • Other constipation   • Urinary retention       Objective     Vitals:  194 5 Lbs 10/21/2022    Blood Pressure: 126 /  64 mmHg    Temperature: 98 0 °F    Pulse: 80 bpm    Respirations: 20 Breaths/min    Blood Sugar: 210 0 mg/dL    O2 Saturation: 95 0 %    Height: 70 0 Inches      Physical Exam  Constitutional:       General: He is not in acute distress  Appearance: Normal appearance  He is not ill-appearing  HENT:      Head: Normocephalic and atraumatic  Cardiovascular:      Rate and Rhythm: Normal rate and regular rhythm  Pulmonary:      Effort: Pulmonary effort is normal  No respiratory distress  Breath sounds: No wheezing  Abdominal:      General: Bowel sounds are normal  There is no distension  Palpations: Abdomen is soft  Tenderness: There is no abdominal tenderness     Skin:     Comments: Pt has some vitiligo on his left arm  Legs - no cyanosis, clubbing or edema   Neurological:      Mental Status: He is alert and oriented to person, place, and time  Psychiatric:         Mood and Affect: Mood normal          Behavior: Behavior normal        Pertinent Laboratory/Diagnostic Studies:  Refer to facility chart     210 0 mg/dL      10/28/2022 16:21 210 0 mg/dL     10/28/2022 16:21 210 0 mg/dL     10/28/2022 11:41 397 0 mg/dL     10/28/2022 11:40 397 0 mg/dL     10/28/2022 11:39 397 0 mg/dL     10/28/2022 07:41 184 0 mg/dL     10/28/2022 07:40 184 0 mg/dL     10/28/2022 07:40 184 0 mg/dL     10/27/2022 20:06 249 0 mg/dL     10/27/2022 20:06 249 0 mg/dL     10/27/2022 16:29 297 0 mg/dL     10/27/2022 16:28 297 0 mg/dL     10/27/2022 16:28 297 0 mg/dL     10/27/2022 12:10 346 0 mg/dL     10/27/2022 12:10 346 0 mg/dL     10/27/2022 12:09 346 0 mg/dL     10/27/2022 11:26 144 0 mg/dL     10/27/2022 08:02 144 0 mg/dL     10/27/2022 08:02 144 0 mg/dL     10/26/2022 20:24 125 0 mg/dL     10/26/2022 17:05 208 0 mg/dL     10/26/2022 17:05 208 0 mg/dL     10/26/2022 13:27 330 0 mg/dL     10/26/2022 13:27 330 0 mg/dL     10/26/2022 13:27 330 0 mg/dL     10/26/2022 08:58 342 0 mg/dL     10/26/2022 08:56 342 0 mg/dL     10/26/2022 08:51 342 0 mg/d       Current Medications   Medications reviewed and updated in facility chart      Khurram Galeano MD  Internal Medicine  Senior Care Physician

## 2022-11-03 ENCOUNTER — NURSING HOME VISIT (OUTPATIENT)
Dept: GERIATRICS | Facility: OTHER | Age: 67
End: 2022-11-03

## 2022-11-03 DIAGNOSIS — C96.6 LANGERHAN'S CELL HISTIOCYTOSIS (HCC): ICD-10-CM

## 2022-11-03 DIAGNOSIS — I21.4 NSTEMI (NON-ST ELEVATED MYOCARDIAL INFARCTION) (HCC): ICD-10-CM

## 2022-11-03 DIAGNOSIS — E11.649 HYPOGLYCEMIA ASSOCIATED WITH DIABETES (HCC): Primary | ICD-10-CM

## 2022-11-04 NOTE — PROGRESS NOTES
[facility]  Kimberly [address]  [phone]) [facility] 6807 SNF  POS 32      NAME: Kirk Query  AGE: 78 y o  SEX: male [id number]    DATE OF ENCOUNTER: 11/3/2022    Assessment and Plan     S/p DKA  DM1  Hypoglycemia  Last Hga1c 8 9 7/2022  Pt with CKD & labile sugars  Episodes of hypoglycemia when pt was in the hospital  Sugars here uncontrolled  Now with sugar 66 this am  Pt already given lantus  Hold next dose  Pt on aspart to lower dose sliding scale before meals; will need to adjsut  Pt on carb controlled diet  F/u outpatient with [facility] endocrinology 12/20/22 @ 10:10am,      S/p NSTEMI  Echo ok  Abn stress test as inpatient  Pt declined cardiac cath  Cont aspirin 81 mg 1 tab po daily  Cont atorvastatin 80 mg 1 tab po QHS  10/31/22 f/u 745am [facility] Cardiology - no further intervention at this time      Langerhans cell histiocytosis  F/u outpatient with oncology     CKDII  Baseline Cr 1 1-1 2  Pt had acute renal failure as inpt when in dka; now resolved  Last Cr 0 8  Check labs this week     Hx unprovoked DVT  Cont eliquis 2 5 mg po BID     PVD  Pt has good b/l pedal pulse upon admission  Cont aspirin 81 mg 1 tab po daily  Cont atorvastatin 80 mg 1 tab po daily     Code status: CPR       210 0 mg/dL      11/3/2022 17:04 314 0 mg/dL     11/3/2022 16:20 314 0 mg/dL     11/3/2022 16:20 314 0 mg/dL     11/3/2022 12:06 364 0 mg/dL     11/3/2022 12:06 364 0 mg/dL       215 0 mg/dL      11/3/2022 08:49 66 0 mg/dL     11/3/2022 08:48 66 0 mg/dL     11/3/2022 08:48 66 0 mg/dL     11/2/2022 20:20 208 0 mg/dL     11/2/2022 20:19 208 0 mg/dL     11/2/2022 16:39 208 0 mg/dL     11/2/2022 16:39 208 0 mg/dL     11/2/2022 16:38 208 0 mg/dL     11/2/2022 13:54 304 0 mg/dL     11/2/2022 13:54 304 0 mg/dL     11/2/2022 13:53 304 0 mg/dL     11/2/2022 08:39 341 0 mg/dL     11/2/2022 08:38 341 0 mg/dL     11/2/2022 08:38 341 0 mg/dL     11/1/2022 21:04 290 0 mg/dL     11/1/2022 21:04 290 0 mg/dL 11/1/2022 18:17 178 0 mg/dL     11/1/2022 16:56 178 0 mg/dL     11/1/2022 16:56 178 0 mg/dL     11/1/2022 12:27 361 0 mg/dL     11/1/2022 11:28 361 0 mg/dL     11/1/2022 11:28 361 0 mg/dL     11/1/2022 08:34 245 0 mg/dL     11/1/2022 08:34 245 0 mg/dL     11/1/2022 08:33 245 0 mg/dL       Code status: Full    Chief Complaint     Routine Long term follow-up visit  History of Present Illness   Patient is a 78 y o  male with PMH DM1, PVD, Orthostatic Hypotension, DVT (unprovoked), CAD, CKD  Pt admitted from 10/17 to 10/21 at [address] [place] [address] [address] for DKA  Pt also had elevated troponin and lexiscan done which revealed moderate sonam-infarct ischemia  Pt with CKD and declined the cardiac cath  He was started on ASA 81 mg 1 tab po daily and lipitor 80 mg 1 tab po QHS  Pt had hx of orthostatic hypotension  Pt admitted to [facility] on 10/21/2022  Pt is a brittle diabetic      Pt seen and examined today as notified by RN pt with sugar 66  Per RN, pt was asymptomatic  However pt with known hypoglycemic episodes in the hospital and must be cautious with insulin  Will hold next dose of lantus  Lower & restart lantus based on sugar trend  Pt says he feels fine      The following portions of the patient's history were reviewed and updated as appropriate: allergies, current medications, past family history, past medical history, past social history, past surgical history and problem list     Review of Systems     Review of Systems      Active Problem List     Patient Active Problem List   Diagnosis   • [facility]ANA (acute [facility]) ([facility]   • Ambulatory dysfunction   • Blurry vision, right eye   • Hyperlipidemia   • Hyperlipidemia associated with type 2 diabetes mellitus ([facility] )   • 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[facility] )   • Orthostatic hypotension   • Peripheral vascular disease, unspecified ([facility] )   • Diabetes mellitus type 1 ([facility] )   • Sinus tachycardia   • Anxiety   • Reactive depression   • Hypoglycemia associated with diabetes ([facility] ) • Coronary artery disease without angina pectoris   • Acute kidney injury superimposed on chronic kidney disease ([place] [address] )   • History of DVT (deep vein thrombosis)   • Hyperlipidemia due to type 1 diabetes mellitus ([place] [address] )   • Hyponatremia   • NSTEMI (non-ST elevated myocardial infarction) ([place] [address] )   • Other constipation   • Urinary retention       Objective     Vitals:     179 5 Lbs 11/3/2022    Blood Pressure: 111 /  68 mmHg    Temperature: 98 0 °F    Pulse: 72 bpm    Respirations: 18 Breaths/min    Blood Sugar:     O2 Saturation: 94 0 %      10/28/2022 16:21 126 / 64 mmHg     10/30/2022 15:55 79 bpm     RR 20    Physical Exam  Constitutional:       General: He is not in acute distress  Appearance: He is not ill-appearing  HENT:      Head: Normocephalic and atraumatic  Cardiovascular:      Rate and Rhythm: Normal rate and regular rhythm  Heart sounds: Normal heart sounds  No murmur heard  Pulmonary:      Effort: No respiratory distress  Breath sounds: Normal breath sounds  No wheezing  Abdominal:      General: Bowel sounds are normal  There is no distension  Palpations: Abdomen is soft  Tenderness: There is no abdominal tenderness  Skin:     Comments: Pt with vitilgo on his arm   Neurological:      Mental Status: He is alert and oriented to person, place, and time  Psychiatric:         Mood and Affect: Mood normal          Behavior: Behavior normal          Pertinent Laboratory/Diagnostic Studies:  Refer to facility chart  Current Medications   Medications reviewed and updated in facility chart      Chico Ramires MD  Internal Medicine  Senior Care Physician

## 2022-11-18 ENCOUNTER — NURSING HOME VISIT (OUTPATIENT)
Dept: GERIATRICS | Facility: OTHER | Age: 67
End: 2022-11-18

## 2022-11-18 DIAGNOSIS — N18.2 TYPE 1 DIABETES MELLITUS WITH STAGE 2 CHRONIC KIDNEY DISEASE (HCC): Primary | ICD-10-CM

## 2022-11-18 DIAGNOSIS — Z86.718 HISTORY OF DVT (DEEP VEIN THROMBOSIS): ICD-10-CM

## 2022-11-18 DIAGNOSIS — I73.9 PERIPHERAL VASCULAR DISEASE, UNSPECIFIED (HCC): ICD-10-CM

## 2022-11-18 DIAGNOSIS — E10.22 TYPE 1 DIABETES MELLITUS WITH STAGE 2 CHRONIC KIDNEY DISEASE (HCC): Primary | ICD-10-CM

## 2022-11-21 NOTE — PROGRESS NOTES
[facility]  Kimberly Lancaster [address]  [phone]) [facility] 6807 SNF  POS 32      NAME: Paula Zeng  AGE: 78 y o   SEX: male [id number]    DATE OF ENCOUNTER: 11/18/2022    Assessment and Plan   S/p DKA  DM1  Hypoglycemia  Last Hga1c 8 9 7/2022  Pt with CKD & labile sugars  148 0 mg/dL      11/20/2022 17:27 166 0 mg/dL     11/20/2022 17:26 166 0 mg/dL     11/20/2022 11:20 309 0 mg/dL     11/20/2022 11:19 309 0 mg/dL     11/20/2022 07:32 202 0 mg/dL     11/20/2022 07:31 202 0 mg/dL     11/19/2022 20:53 127 0 mg/dL     11/19/2022 16:22 148 0 mg/dL     11/19/2022 16:21 148 0 mg/dL     11/19/2022 16:21 148 0 mg/dL     11/19/2022 12:05 250 0 mg/dL     11/19/2022 12:02 250 0 mg/dL     11/19/2022 12:01 250 0 mg/dL     11/19/2022 08:51 286 0 mg/dL     11/19/2022 08:50 286 0 mg/dL     Episodes of hypoglycemia when pt was in the hospital  Sugars here uncontrolled  Pt on carb controlled diet  Cont Lantus 20 units SQ QHS (pt can not tolerate increased dosing even to 23 units as gets hypoglycemic)  Pt on sliding scale insulin Novolog:  INJECT SQ THREE TIMES DAILY BEFORE MEALS PER SLIDING SCALE: 150-199= 3 UNITS, 200-249= 6 UNITS, 250-299=9 UNITS, 300-349 = 12 UNITS, 350-399= 15 UNITS, >400 = 15 UNITS & CALL MD, <70 ANGEL call MD  F/u outpatient with [facility] endocrinology 12/20/22 @ 10:10am,      S/p NSTEMI  Echo ok  Abn stress test as inpatient  Pt declined cardiac cath  Cont aspirin 81 mg 1 tab po daily  Cont atorvastatin 80 mg 1 tab po QHS  10/31/22 f/u 745am [facility] Cardiology - no further intervention at this time      Langerhans cell histiocytosis  F/u outpatient with oncology     CKDII  Baseline Cr 1 1-1 2  Pt had acute renal failure as inpt when in [place]; now resolved  Last Cr 0 8  Check labs this week     Hx unprovoked DVT  Cont eliquis 2 5 mg po BID     PVD  Pt has good b/l pedal pulse upon admission  Cont aspirin 81 mg 1 tab po daily  Cont atorvastatin 80 mg 1 tab po daily     Code status: CPR      Chief Complaint     Routine Long term follow-up visit  History of Present Illness   Patient is a 78 y o  male with PMH DM1, PVD, Orthostatic Hypotension, DVT (unprovoked), CAD, CKD  Pt admitted from 10/17 to 10/21 at [address] [place] [address] [address] for DKA  Pt seen and examined by me  Pt denies any nausea  The following portions of the patient's history were reviewed and updated as appropriate: allergies, current medications, past family history, past medical history, past social history, past surgical history and problem list     Review of Systems     Review of Systems   All other systems reviewed and are negative  Active Problem List     Patient Active Problem List   Diagnosis   • ROSANA (acute kidney injury) ([facility] )   • Ambulatory dysfunction   • Blurry vision, right eye   • Hyperlipidemia   • Hyperlipidemia associated with type 2 diabetes mellitus ([facility] )   • 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[facility] )   • Orthostatic hypotension   • Peripheral vascular disease, unspecified ([facility] )   • Diabetes mellitus type 1 ([facility] )   • Sinus tachycardia   • Anxiety   • Reactive depression   • Hypoglycemia associated with diabetes ([facility] )   • Coronary artery disease without angina pectoris   • Acute kidney injury superimposed on chronic kidney disease (HCC)   • History of DVT (deep vein thrombosis)   • Hyperlipidemia due to type 1 diabetes mellitus ([facility] )   • Hyponatremia   • NSTEMI (non-ST elevated myocardial infarction) ([facility] )   • Other constipation   • Urinary retention       Objective     Vitals:  VSS  10/28/2022 16:21 126 / 64 mmHg     Physical Exam  Constitutional:       General: He is not in acute distress  Appearance: He is not ill-appearing  HENT:      Head: Normocephalic and atraumatic  Cardiovascular:      Rate and Rhythm: Normal rate and regular rhythm  Pulses: Normal pulses  Heart sounds: Normal heart sounds  No murmur heard  Pulmonary:      Effort: No respiratory distress        Breath sounds: Normal breath sounds  No wheezing  Abdominal:      General: Bowel sounds are normal  There is no distension  Palpations: Abdomen is soft  Tenderness: There is no abdominal tenderness  Skin:     Comments: Legs - no cyanosis, clubbing or edema   Neurological:      Mental Status: He is alert and oriented to person, place, and time  Psychiatric:         Mood and Affect: Mood normal          Thought Content: Thought content normal          Judgment: Judgment normal        Pertinent Laboratory/Diagnostic Studies:  Refer to facility chart  Current Medications   Medications reviewed and updated in facility chart      Yeni Todd MD  Internal Medicine  Senior Care Physician

## 2022-12-17 ENCOUNTER — TELEPHONE (OUTPATIENT)
Dept: OTHER | Facility: OTHER | Age: 67
End: 2022-12-17

## 2022-12-17 NOTE — TELEPHONE ENCOUNTER
Rodriguez Osman called from [facility], requesting a call back from on call provider, regarding pt's high blood sugar levels   Provider paged via [facility]

## 2022-12-19 ENCOUNTER — TELEPHONE (OUTPATIENT)
Dept: OTHER | Facility: OTHER | Age: 67
End: 2022-12-19

## 2022-12-20 ENCOUNTER — NURSING HOME VISIT (OUTPATIENT)
Dept: GERIATRICS | Facility: OTHER | Age: 67
End: 2022-12-20

## 2022-12-20 DIAGNOSIS — Z86.718 HISTORY OF DVT (DEEP VEIN THROMBOSIS): ICD-10-CM

## 2022-12-20 DIAGNOSIS — C96.6 LANGERHAN'S CELL HISTIOCYTOSIS (HCC): ICD-10-CM

## 2022-12-20 DIAGNOSIS — E10.22 TYPE 1 DIABETES MELLITUS WITH STAGE 2 CHRONIC KIDNEY DISEASE (HCC): Primary | ICD-10-CM

## 2022-12-20 DIAGNOSIS — I25.10 CORONARY ARTERY DISEASE WITHOUT ANGINA PECTORIS, UNSPECIFIED VESSEL OR LESION TYPE, UNSPECIFIED WHETHER NATIVE OR TRANSPLANTED HEART: ICD-10-CM

## 2022-12-20 DIAGNOSIS — N18.2 CKD (CHRONIC KIDNEY DISEASE) STAGE 2, GFR 60-89 ML/MIN: ICD-10-CM

## 2022-12-20 DIAGNOSIS — I73.9 PERIPHERAL VASCULAR DISEASE, UNSPECIFIED (HCC): ICD-10-CM

## 2022-12-20 DIAGNOSIS — N18.2 TYPE 1 DIABETES MELLITUS WITH STAGE 2 CHRONIC KIDNEY DISEASE (HCC): Primary | ICD-10-CM

## 2022-12-20 NOTE — TELEPHONE ENCOUNTER
Olvin Parikh from [facility] called back stating on call requested to be paged at 8  This is regarding patients 8pm blood sugar  On call notified via TC

## 2022-12-21 NOTE — PROGRESS NOTES
[facility]  Kimberly Lancaster [address]  [phone]) [facility] 6[address] [facility]  POS 32      NAME: Matt Myers  AGE: 78 y o  SEX: male [id number]    DATE OF ENCOUNTER: 12/20/2022    Assessment and Plan    Diabetes mellitus type 1 ([facility] )  Pt with CKD & labile sugars  Lab Results   Component Value Date    HGBA1C 8 9 (H) 07/25/2022   pt had episodes of hypoglycemia as inpatient  Thus he can not tolerate higher doses of lantus  Pt had endocrinology [facility] f/u today with Edison Almonte who recommended:  "Continue lantus 20 units daily    Start novolog 8 units 3 times/day before meals    Start ISF 40    Diabetes (ISF-40)    Insulin Sensitivity Factor (ISF) Correction Scale: 40 mg/dl  1 unit of insulin drops Blood Glucose (BG) 40 points  BG Target = 120 mg/dl    Take additional short acting insulin for blood glucose as in chart below  Use prescribed insulin (Humalog/Novolog/Apidra/Admelog/Fiasp/Lyumjev)      BG (mg/dL) Insulin Dose    0 Extra Units   156-195 1 Units   196-235 2 Units   236-275 3 Units   276-315 4 Units   316-355 5 Units   356-390 6 Units   391-410 7 Units   Above 410 Call Doctor"    (As per care everywhere)    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[facility] )  F/u outpatient with [facility] oncology    CKD (chronic kidney disease) stage 2, GFR 60-89 ml/min  Lab Results   Component Value Date    EGFR 60 06/03/2022    EGFR 57 01/27/2022    EGFR 84 05/05/2021    CREATININE 1 24 06/03/2022    CREATININE 1 29 01/27/2022    CREATININE 0 95 05/05/2021   baseline Cr 1 1-1 2  10/21/22 Cr 0 91  stable      Coronary artery disease without angina pectoris  Pt had abnormal stress test as inpatient  Pt declined cardiac cath  Pt sees [facility] cardiology on a regular basis  Pt asymptomatic  Cont aspirin 81 mg 1 tab po daily  Cont atorvastatin 80 mg 1 tab po QHS    History of DVT (deep vein thrombosis)  Cont eliquis 2 5 mg po BID    Peripheral vascular disease, unspecified (HCC)  Stable  Cont aspirin 81 mg 1 tab po daily  Cont atorvastatin 80 mg 1 tab po QHS     Code status: Full    Chief Complaint     Routine Long term follow-up visit  History of Present Illness   Asked by RN to see pt as part of 60 day visit  Pt feels fine  Pt saw endocrinology today  Adjusted am medication accordingly  The following portions of the patient's history were reviewed and updated as appropriate: allergies, current medications, past family history, past medical history, past social history, past surgical history and problem list     Review of Systems     Review of Systems   All other systems reviewed and are negative  Active Problem List     Patient Active Problem List   Diagnosis   • ROSANA (acute kidney injury) ([facility] )   • Ambulatory dysfunction   • Blurry vision, right eye   • Hyperlipidemia   • Hyperlipidemia associated with type 2 diabetes mellitus ([facility] )   • 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[facility] )   • Orthostatic hypotension   • Peripheral vascular disease, unspecified ([facility] )   • Diabetes mellitus type 1 ([facility] )   • Sinus tachycardia   • Anxiety   • Reactive depression   • Hypoglycemia associated with diabetes ([facility] )   • Coronary artery disease without angina pectoris   • Acute kidney injury superimposed on chronic kidney disease (HCC)   • History of DVT (deep vein thrombosis)   • Hyperlipidemia due to type 1 diabetes mellitus ([facility] )   • Hyponatremia   • NSTEMI (non-ST elevated myocardial infarction) ([facility])   • Other constipation   • Urinary retention       Objective     VSS as reviewed in [facility]    Physical exam:  A&Ox3  CVS - RRR  Lungs- CTAB  Abd - NT/ND +BS  Legs - no c/c/e  Skin - c/d/i  Neuro - no focal neuro deficits  Psych - calm, cooperative    Pertinent Laboratory/Diagnostic Studies:  Refer to facility chart  Current Medications   Medications reviewed and updated in facility chart      Jhony Rosales MD  Internal Medicine  Senior Care Physician

## 2022-12-22 ENCOUNTER — TELEPHONE (OUTPATIENT)
Dept: OTHER | Facility: OTHER | Age: 67
End: 2022-12-22

## 2023-01-20 ENCOUNTER — NURSING HOME VISIT (OUTPATIENT)
Dept: GERIATRICS | Facility: OTHER | Age: 68
End: 2023-01-20

## 2023-01-20 DIAGNOSIS — E11.69 HYPERLIPIDEMIA ASSOCIATED WITH TYPE 2 DIABETES MELLITUS (HCC): ICD-10-CM

## 2023-01-20 DIAGNOSIS — N18.2 TYPE 1 DIABETES MELLITUS WITH STAGE 2 CHRONIC KIDNEY DISEASE (HCC): ICD-10-CM

## 2023-01-20 DIAGNOSIS — E10.22 TYPE 1 DIABETES MELLITUS WITH STAGE 2 CHRONIC KIDNEY DISEASE (HCC): ICD-10-CM

## 2023-01-20 DIAGNOSIS — E78.5 HYPERLIPIDEMIA ASSOCIATED WITH TYPE 2 DIABETES MELLITUS (HCC): ICD-10-CM

## 2023-01-20 DIAGNOSIS — I73.9 PERIPHERAL VASCULAR DISEASE, UNSPECIFIED (HCC): ICD-10-CM

## 2023-01-20 DIAGNOSIS — R11.2 NAUSEA AND VOMITING, UNSPECIFIED VOMITING TYPE: Primary | ICD-10-CM

## 2023-02-05 NOTE — PROGRESS NOTES
[facility]  Kimberly Lancaster [address]  [phone]) [facility] 6807   [address] 32      NAME: Vandana Tran  AGE: 78 y o  SEX: male [id number]    DATE OF ENCOUNTER: 1/20/2023    Assessment and Plan     1  Nausea and vomiting, unspecified vomiting type        2  Type 1 diabetes mellitus with stage 2 chronic kidney disease ([facility] )        3  Hyperlipidemia associated with type 2 diabetes mellitus ([facility] )        4  Peripheral vascular disease, unspecified ([facility] )           Vomiting  Likely due to gastroenteritis  Cont prn zofran  Resolved now    Diabetes mellitus type 1 ([facility] )  Labile sugars; mostly controlled at lunch/dinner  Lab Results   Component Value Date    HGBA1C 8 3 (H) 02/09/2023     145 0 mg/dL 1/20/2023 20:32     1/20/2023 17:27 181 0 mg/dL     1/20/2023 12:26 155 0 mg/dL     1/20/2023 12:25 155 0 mg/dL     1/20/2023 07:45 353 0 mg/dL     1/19/2023 21:10 129 0 mg/dL     1/19/2023 17:19 111 0 mg/dL     1/19/2023 12:58 266 0 mg/dL     1/19/2023 11:55 266 0 mg/dL     1/19/2023 09:50 270 0 mg/dL     1/18/2023 21:29 298 0 mg/dL     1/18/2023 17:15 191 0 mg/dL     1/18/2023 12:32 275 0 mg/dL     1/18/2023 08:24 268 0 mg/dL       Cont lantus 20 units SQ daily  Cont Novolog SSI as per pt's endo  Cont Novolog 8 units SQ TID    Hyperlipidemia associated with type 2 diabetes mellitus (HCC)  Check lipid panel on 2/9/2023  Lab Results   Component Value Date    HGBA1C 8 3 (H) 02/09/2023   Cont atorvastatin 80 mg 1 tab po QHS    Peripheral vascular disease, unspecified (HCC)  Stable  Cont asa 81 mg daily  Cont atorvastatin 80 mg qhs       Code status: full    Chief Complaint     Routine Long term follow-up visit  History of Present Illness   Asked by RN to see pt as he had belly discomfort  Pt had problems with indigestion last night  Pt improved today  Pt appears medically stable      pointcare click:  0/[id number] 31:96 Nurses Note   Note Text: appt update for cardiology changed to 1/26/23 pickup at 9am   resident also had 2 episodes of emesis today of undigested food  new order to hold novolog tonight for dinner and for PRN zofran  sister Elizabeth Garza updated and agreeable with plan of care  The following portions of the patient's history were reviewed and updated as appropriate: allergies, current medications, past family history, past medical history, past social history, past surgical history and problem list     Review of Systems     Review of Systems   All other systems reviewed and are negative  no further nausea vomiting this am    Active Problem List     Patient Active Problem List   Diagnosis   • ROSANA (acute kidney injury) ([facility] )   • Ambulatory dysfunction   • Blurry vision, right eye   • Hyperlipidemia   • Hyperlipidemia associated with type 2 diabetes mellitus ([facility] )   • 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[facility] )   • Orthostatic hypotension   • Peripheral vascular disease, unspecified ([facility] )   • Diabetes mellitus type 1 ([facility] )   • Sinus tachycardia   • Anxiety   • Reactive depression   • Hypoglycemia associated with diabetes ([facility] )   • Coronary artery disease without angina pectoris   • Acute kidney injury superimposed on chronic kidney disease (HCC)   • History of DVT (deep vein thrombosis)   • Hyperlipidemia due to type 1 diabetes mellitus ([facility] )   • Hyponatremia   • NSTEMI (non-ST elevated myocardial infarction) ([facility])   • Other constipation   • Urinary retention   • CKD (chronic kidney disease) stage 2, GFR 60-89 ml/min   • Vomiting       Objective     Vitals: Reviewed in PointCareClick system  VSS    General: Awake, alert  Head: atraumatic, normocephalic  Cardiovascular: RRR  Lungs: Clear to auscultation bilaterally  Abdomen: nontender/nondistended, +BS  Legs: no cyanosis, clubbing or edema  Skin: clean, dry, intact  Psych: calm, cooperative    Pertinent Laboratory/Diagnostic Studies:  Refer to facility chart      Current Medications   Medications reviewed and updated in facility chart     Acetaminophen Tablet 325 mg     Acetaminophen Suppository 650 MG Insert 1 suppository rectally every 4 hours as needed for Mild Pain Max 3 GM APAP / 24 HR, Give Suppository If Unable to Take By Mouth AND Insert 1 suppository rectally every 4 hours as needed for Temperature = or > 100 degrees Oral / 101 degrees Rectal Max 3 GM APAP / 24 HR, Give Suppository If Unable to Take By Mouth    ELIQUIS 2 5MG TABLET GIVE 1 TABLET BY MOUTH TWICE DAILY FOR DEEP VEIN THROMBOSIS(Related Diagnoses: PERSONAL HISTORY OF OTHER VENOUS THROMBOSIS AND EMBOLISM (Z86 718))(Indications for Use: DVT)    ASPIRIN 81MG LOW DOSE CHEW TAB Give 1 tablet orally one time a day for CAD(Related Diagnoses: ATHEROSCLEROTIC HEART DISEASE OF NATIVE CORONARY ARTERY WITHOUT ANGINA PECTORIS (I25 10))    ATORVASTATIN 80MG TABLET GIVE 1 TABLET BY MOUTH ONCE DAILY WITH DINNER AT 1700 FOR HYPERLIPIDEMIA(Related Diagnoses: HYPERLIPIDEMIA, UNSPECIFIED (E78 5))    CALCIUM+D3 600MG-200U TABLET GIVE 1 TABLET BY MOUTH TWICE DAILY FOR VITAMIN DEFICIENCY(Related Diagnoses: VITAMIN D DEFICIENCY, UNSPECIFIED (E55 9))(Indications for Use: vit  def )    DOCUSATE 100MG CAPSULE Give 1 capsule orally two times a day for constipation(Related Diagnoses: CONSTIPATION, UNSPECIFIED (K59 00))    DORZOLAMIDE/TIMOLOL OPTH SOLN INSTILL ONE DROP INTO RIGHT EYE TWICE DAILY FOR GLAUCOMA(Related Diagnoses: TYPE 1 DIABETES MELLITUS WITH PROLIFERATIVE DIABETIC RETINOPATHY WITH MACULAR EDEMA, BILATERAL (E10 3513))    LATANOPROST 0 005% OPTH SOLN INSTILL ONE DROP INTO RIGHT EYE ONCE DAILY AT BEDTIME FOR GLAUCOMA *REFRIGERATE UNTIL OPEN, THEN 6 WEEKS AT ROOM TEMPERATURE*(Related Diagnoses: TYPE 1 DIABETES MELLITUS WITH PROLIFERATIVE DIABETIC RETINOPATHY WITH MACULAR EDEMA, BILATERAL (E10 3513))    MIDODRINE 5MG TABLET GIVE 1 TABLET BY MOUTH ONCE DAILY AS NEEDED FOR ORTHOSTATIC HYPOTENSION (IF SYSTOLIC BLOOD PRESSURE < 90)(Related Diagnoses: ORTHOSTATIC HYPOTENSION (I95 1))    Enema Disposable Enema (Sodium Phosphates) Insert 1 application rectally as needed for Constipation 1 Time a Day, If NO BM within 3-5 Hours of Suppository Insertion Give Enema    Bisac-Evac Suppository 10 MG (Bisacodyl) Insert 1 suppository rectally as needed for Constipation 1 Time a Day, If NO BM by AM Day # 4, Day Nurse Gives a Suppository that AM    Sorbitol Solution 70 % Give 30 ml orally as needed for Bowel Protocol for ROSANA/CKD 1 Time a Day, If No BM on by Day # 2, Evening Nurse Gives a Laxative on Day # 2 AND Give 30 ml orally as needed for Bowel Protocol for ROSANA/CKD 1 Time a Day, If No BM on by Day # 3, Evening Nurse Gives a Laxative on Day # 3    MiraLax Powder (Polyethylene Glycol 3350) Give 17 gram orally every 24 hours as needed for constipation mix w/ 4oz fluid of choice    LANTUS SOLOSTAR 100UNITS/ML INJECT 20 UNITS SQ ONCE DAILY *REFRIGERATE UNTIL OPEN, THEN 28 DAYS AT ROOM TEMPERATURE*(Related Diagnoses: TYPE 1 DIABETES MELLITUS WITH KETOACIDOSIS WITHOUT COMA (E10 10))    NOVOLOG FLEXPEN 100UNITS/ML INJECT SQ BEFORE MEALS AND AT BEDTIME PER SLIDING SCALE: 156-195= 1 UNIT, , 196-235= 2 UNITS, 236-275=3 UNITS, 276-315 = 4 UNITS, 316-355= 5 UNITS, 356-390=6 UNITS, 391-410=7 UNITS, >410=8 UNITS , <70 OR >450 =CALL MD AND FOLLOW HYPOGLYCEMIC PROTOCOL *REFRIGERATE UNTIL OPEN, THEN 28 DAYS AT ROOM TEMPERATURE*(Related Diagnoses: TYPE 1 DIABETES MELLITUS WITH KETOACIDOSIS WITHOUT COMA (E10 10))(Additional Directions: INJECT SQ BEFORE MEALS+ AT BEDTIME PER SLIDING SCALE: 156-195= 1 UNIT, 196-235= 2 UNITS, 236-275=3 UNITS, 276-315 = 4 UNITS, 316-355= 5 UNITS, 356-390=6 UNITS, 391-410=7 UNITS,>410=8 UNITS , <70 OR >450 =CALL MD+FOLLOW HYPOGLYCEMIC PROTOCOL)    NOVOLOG FLEXPEN 100UNITS/ML INJECT 8 UNITS SQ THREE TIMES DAILY BEFORE MEALS (HOLD IF SUGAR <70 OR IF NOT EATING) *REFRIGERATE UNTIL OPEN, THEN 28 DAYS AT ROOM TEMPERATURE*(Related Diagnoses: TYPE 1 DIABETES MELLITUS WITH KETOACIDOSIS WITHOUT COMA (E10 10)) Francis Chatman MD  Internal Medicine  Senior Care Physician

## 2023-02-12 PROBLEM — R11.10 VOMITING: Status: ACTIVE | Noted: 2023-01-18

## 2023-02-12 PROBLEM — N18.2 CKD (CHRONIC KIDNEY DISEASE) STAGE 2, GFR 60-89 ML/MIN: Status: ACTIVE | Noted: 2022-12-20

## 2023-02-12 NOTE — ASSESSMENT & PLAN NOTE
Labile sugars; mostly controlled at lunch/dinner  Lab Results   Component Value Date    HGBA1C 8 3 (H) 02/09/2023     145 0 mg/dL 1/20/2023 20:32     1/20/2023 17:27 181 0 mg/dL     1/20/2023 12:26 155 0 mg/dL     1/20/2023 12:25 155 0 mg/dL     1/20/2023 07:45 353 0 mg/dL     1/19/2023 21:10 129 0 mg/dL     1/19/2023 17:19 111 0 mg/dL     1/19/2023 12:58 266 0 mg/dL     1/19/2023 11:55 266 0 mg/dL     1/19/2023 09:50 270 0 mg/dL     1/18/2023 21:29 298 0 mg/dL     1/18/2023 17:15 191 0 mg/dL     1/18/2023 12:32 275 0 mg/dL     1/18/2023 08:24 268 0 mg/dL       Cont lantus 20 units SQ daily  Cont Novolog SSI as per pt's endo  Cont Novolog 8 units SQ TID

## 2023-02-12 NOTE — ASSESSMENT & PLAN NOTE
Pt had abnormal stress test as inpatient  Pt declined cardiac cath  Pt sees [facility] cardiology on a regular basis  Pt asymptomatic  Cont aspirin 81 mg 1 tab po daily  Cont atorvastatin 80 mg 1 tab po QHS

## 2023-02-12 NOTE — ASSESSMENT & PLAN NOTE
Pt with CKD & labile sugars  Lab Results   Component Value Date    HGBA1C 8 9 (H) 07/25/2022   pt had episodes of hypoglycemia as inpatient  Thus he can not tolerate higher doses of lantus  Pt had endocrinology [facility] f/u today with Dr Morris who recommended:  "Continue lantus 20 units daily    Start novolog 8 units 3 times/day before meals    Start ISF 40    Diabetes (ISF-40)    Insulin Sensitivity Factor (ISF) Correction Scale: 40 mg/dl  1 unit of insulin drops Blood Glucose (BG) 40 points  BG Target = 120 mg/dl    Take additional short acting insulin for blood glucose as in chart below  Use prescribed insulin (Humalog/Novolog/Apidra/Admelog/Fiasp/Lyumjev)      BG (mg/dL) Insulin Dose    0 Extra Units   156-195 1 Units   196-235 2 Units   236-275 3 Units   276-315 4 Units   316-355 5 Units   356-390 6 Units   391-410 7 Units   Above 410 Call Doctor"    (As per care everywhere)

## 2023-02-12 NOTE — ASSESSMENT & PLAN NOTE
Lab Results   Component Value Date    EGFR 60 06/03/2022    EGFR 57 01/27/2022    EGFR 84 05/05/2021    CREATININE 1 24 06/03/2022    CREATININE 1 29 01/27/2022    CREATININE 0 95 05/05/2021   baseline Cr 1 1-1 2  10/21/22 Cr 0 91  stable

## 2023-02-13 NOTE — ASSESSMENT & PLAN NOTE
Check lipid panel on 2/9/2023  Lab Results   Component Value Date    HGBA1C 8 3 (H) 02/09/2023   Cont atorvastatin 80 mg 1 tab po QHS

## 2023-03-08 NOTE — PROGRESS NOTES
[facility]  [address]  [phone]) [facility] [address] [facility]  [address]      NAME: Karole Simmonds  AGE: 78 y o  SEX: male [id number]    DATE OF ENCOUNTER: 3/9/2023    Assessment and Plan     1  Type 1 diabetes mellitus with stage 2 chronic kidney disease ([facility] )        2  Coronary artery disease without angina pectoris, unspecified vessel or lesion type, unspecified whether native or transplanted heart        3  Hyperlipidemia, unspecified hyperlipidemia type        4  Peripheral vascular disease, unspecified ([facility] )           Diabetes mellitus type 1 ([facility] )    Lab Results   Component Value Date    HGBA1C 8 3 (H) 02/09/2023   Noted pt's lunch time sugars higher; thus increased to 10 units lispro for lunch time  Cont 8 units before breakfast & dinner  Cont lantus 20 units SQ daily (pt unable to tolerate increases in lantus)  Cont SSI lispro as recommended by pt's endocrinologist  Please fax pt's sugars to pt's endocrinologist    Optometry Consult and Treat as Needed [facility] for sight [address] [facility], [phone] on 04/28/23 @ 0930 via  for F/u     Endocrinology Consult and Treat- [address] [facility] [address] [phone]  F/U 4/26/23 P/U 0950 via w/c  Coronary artery disease without angina pectoris  Pt had declined cardiac cath as inpatient  Discussed importance of cardiac cath to help tx pt's cardiac disease  Noted his renal function is back to normal now  Pt declines further w/u & does not wish for further aggressive cardiac care  Defers code status to his sister    Cont asa 81 mg 1 tab po daily & atorvastatin 80 mg 1 tab po QHS    Hyperlipidemia  Stable  Cont atovastatin 80 mg 1 tab po QHS    Peripheral vascular disease, unspecified (HCC)  Stable  Cont asa & atorvastatin       Code status: cpr as pt defers to his sister    Chief Complaint     Routine Long term follow-up visit  60 day orders signed    History of Present Illness   Pt seen and examined as part of 60 day visit   Noted pt's lunch time sugars higher  Thus increased pre lunch insulin to 10 units & asked staff to fax current sugar trend to pt's endocrinologist    The following portions of the patient's history were reviewed and updated as appropriate: allergies, current medications, past family history, past medical history, past social history, past surgical history and problem list     Review of Systems     Review of Systems   Cardiovascular: Negative for chest pain  All other systems reviewed and are negative  Active Problem List     Patient Active Problem List   Diagnosis   • ROSANA (acute kidney injury) ([place] [address] )   • Ambulatory dysfunction   • Blurry vision, right eye   • Hyperlipidemia   • Hyperlipidemia associated with type 2 diabetes mellitus ([place] [address] )   • 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[place] [address] )   • Orthostatic hypotension   • Peripheral vascular disease, unspecified ([place] [address] )   • Diabetes mellitus type 1 ([place] [address] )   • Sinus tachycardia   • Anxiety   • Reactive depression   • Hypoglycemia associated with diabetes ([place] [address] )   • Coronary artery disease without angina pectoris   • Acute kidney injury superimposed on chronic kidney disease ([facility])   • History of DVT (deep vein thrombosis)   • Hyperlipidemia due to type 1 diabetes mellitus ([place] [address] )   • Hyponatremia   • NSTEMI (non-ST elevated myocardial infarction) ([facility])   • Other constipation   • Urinary retention   • CKD (chronic kidney disease) stage 2, GFR 60-89 ml/min   • Vomiting       Objective     Vitals: Reviewed in [employer] system   VSS    194 5 Lbs 3/1/2023 08:42     Blood Pressure: 115 /  57 mmHg 9/7/8843 51:97 • Diastolic Low of 60 exceeded   Temperature: 98 1 °F 3/8/2023 14:04    Pulse: 67 bpm 3/3/2023 10:49    Respirations:       Blood Sugar: 100 0 mg/dL 3/8/2023 17:26    O2 Saturation:       Height:       Pain Level: 0 3/8/2023       General: Awake, A&Ox3  Head: atraumatic, normocephalic  Cardiovascular: RRR  Lungs: Clear to auscultation bilaterally  Abdomen: nontender/nondistended, +BS  Legs: no cyanosis, clubbing or edema  Skin: clean, dry, intact  Psych: calm, cooperative    Pertinent Laboratory/Diagnostic Studies:  Refer to facility chart      3/8/2023 17:25 100 0 mg/dL    3/8/2023 12:12 333 0 mg/dL    3/8/2023 12:12 333 0 mg/dL    3/8/2023 11:20 333 0 mg/dL    3/8/2023 09:05 212 0 mg/dL    3/8/2023 09:05 212 0 mg/dL    3/8/2023 07:50 212 0 mg/dL    3/7/2023 20:44 125 0 mg/dL    3/7/2023 20:43 125 0 mg/dL    3/7/2023 17:17 147 0 mg/dL    3/7/2023 17:16 147 0 mg/dL    3/7/2023 17:15 147 0 mg/dL    3/7/2023 12:48 383 0 mg/dL    3/7/2023 12:48 383 0 mg/dL    3/7/2023 11:11 383 0 mg/dL    3/7/2023 09:01 158 0 mg/dL    3/7/2023 09:01 158 0 mg/dL    3/7/2023 08:59 158 0 mg/dL    3/6/2023 20:19 161 0 mg/dL    3/6/2023 20:17 161 0 mg/dL    3/6/2023 17:50 180 0 mg/dL    3/6/2023 17:50 180 0 mg/dL    3/6/2023 17:49 180 0 mg/dL    3/6/2023 11:37 377 0 mg/dL    3/6/2023 11:36 377 0 mg/dL    3/6/2023 11:36 377 0 mg/dL    3/6/2023 08:41 236 0 mg/dL    3/6/2023 08:41 236 0 mg/dL    3/6/2023 08:40 236 0 mg/dL    3/5/2023 20:38 340 0 mg/dL    3/5/2023 20:38 340 0 mg/dL    3/5/2023 16:31 216 0 mg/dL    3/5/2023 16:29 216 0 mg/dL    3/5/2023 16:28 216 0 mg/dL    3/5/2023 11:32 283 0 mg/dL    3/5/2023 11:32 283 0 mg/dL    3/5/2023 11:32 283 0 mg/dL    3/5/2023 09:02 153 0 mg/dL    3/5/2023 09:00 153 0 mg/dL    3/5/2023 07:28 153 0 mg/dL    3/4/2023 20:14 204 0 mg/dL    3/4/2023 20:14 204 0 mg/dL    3/4/2023 18:19 123 0 mg/dL    3/4/2023 18:12 123 0 mg/dL    3/4/2023 16:14 182 0 mg/dL    3/4/2023 12:58 283 0 mg/dL    3/4/2023 12:57 283 0 mg/dL    3/4/2023 11:32 283 0 mg/dL    3/4/2023 08:20 199 0 mg/dL    3/4/2023 08:19 199 0 mg/dL    3/4/2023 07:55 199 0 mg/dL    3/3/2023 20:35 196 0 mg/dL    3/3/2023 20:35 196 0 mg/dL    3/3/2023 17:08 131 0 mg/dL    3/3/2023 16:37 131 0 mg/dL    3/3/2023 16:36 131 0 mg/dL    3/3/2023 13:18 325 0 mg/dL    3/3/2023 13:14 325 0 mg/dL    3/3/2023 12:14 325 0 mg/dL    3/3/2023 08:19 359 0 mg/dL    3/3/2023 08:19 359 0 mg/dL    3/3/2023 08:16 359 0 mg/dL    3/2/2023 20:51 167 0 mg/dL    3/2/2023 20:50 167 0 mg/dL    3/2/2023 16:55 146 0 mg/dL    3/2/2023 16:54 146 0 mg/dL    3/2/2023 16:54 146 0 mg/dL    3/2/2023 12:24 341 0 mg/dL    3/2/2023 12:23 341 0 mg/dL    3/2/2023 12:22 341 0 mg/dL    3/2/2023 10:15 112 0 mg/dL    3/2/2023 08:15 112 0 mg/dL    3/1/2023 20:35 171 0 mg/dL    3/1/2023 20:35 171 0 mg/dL    3/1/2023 17:48 150 0 mg/dL    3/1/2023 17:48 150 0 mg/dL    3/1/2023 16:34 150 0 mg/dL    3/1/2023 12:39 209 0 mg/dL    3/1/2023 12:38 209 0 mg/dL    3/1/2023 11:15 209 0 mg/dL    3/1/2023 08:18 147 0 mg/dL    3/1/2023 08:13 147 0 mg/dL    3/1/2023 07:31 147 0 mg/dL          Current Medications   Medications reviewed and updated in facility chart      Acetaminophen Tablet 325 MG     Acetaminophen Suppository 650 MG Insert 1 suppository rectally every 4 hours as needed for Mild Pain Max 3 GM APAP / 24 HR, Give Suppository If Unable to Take By Mouth AND Insert 1 suppository rectally every 4 hours as needed for Temperature = or > 100 degrees Oral / 101 degrees Rectal Max 3 GM APAP / 24 HR, Give Suppository If Unable to Take By Mouth    ELIQUIS 2 5MG TABLET GIVE 1 TABLET BY MOUTH TWICE DAILY FOR DEEP VEIN THROMBOSIS(Related Diagnoses: PERSONAL HISTORY OF OTHER VENOUS THROMBOSIS AND EMBOLISM (Z86 678))(Indications for Use: DVT)    ASPIRIN 81MG LOW DOSE CHEW TAB Give 1 tablet orally one time a day for CAD(Related Diagnoses: ATHEROSCLEROTIC HEART DISEASE OF NATIVE CORONARY ARTERY WITHOUT ANGINA PECTORIS (I25 10))    ATORVASTATIN 80MG TABLET GIVE 1 TABLET BY MOUTH ONCE DAILY WITH DINNER AT 1700 FOR HYPERLIPIDEMIA(Related Diagnoses: HYPERLIPIDEMIA, UNSPECIFIED (E78 5))    CALCIUM+D3 600MG-200U TABLET GIVE 1 TABLET BY MOUTH TWICE DAILY FOR VITAMIN DEFICIENCY(Related Diagnoses: VITAMIN D DEFICIENCY, UNSPECIFIED (E55 9))(Indications for Use: vit  def )    DOCUSATE 100MG CAPSULE Give 1 capsule orally two times a day for constipation(Related Diagnoses: CONSTIPATION, UNSPECIFIED (K59 00))    DORZOLAMIDE/TIMOLOL OPTH SOLN INSTILL ONE DROP INTO RIGHT EYE TWICE DAILY FOR GLAUCOMA(Related Diagnoses: TYPE 1 DIABETES MELLITUS WITH PROLIFERATIVE DIABETIC RETINOPATHY WITH MACULAR EDEMA, BILATERAL (E10 3513))    LATANOPROST 0 005% OPTH SOLN INSTILL ONE DROP INTO RIGHT EYE ONCE DAILY AT BEDTIME FOR GLAUCOMA *REFRIGERATE UNTIL OPEN, THEN 6 WEEKS AT ROOM TEMPERATURE*(Related Diagnoses: TYPE 1 DIABETES MELLITUS WITH PROLIFERATIVE DIABETIC RETINOPATHY WITH MACULAR EDEMA, BILATERAL (E10 3513))    MIDODRINE 5MG TABLET GIVE 1 TABLET BY MOUTH ONCE DAILY AS NEEDED FOR ORTHOSTATIC HYPOTENSION (IF SYSTOLIC BLOOD PRESSURE < 90)(Related Diagnoses: ORTHOSTATIC HYPOTENSION (I95 1))    Enema Disposable Enema (Sodium Phosphates) Insert 1 application rectally as needed for Constipation 1 Time a Day, If NO BM within 3-5 Hours of Suppository Insertion Give Enema    Bisac-Evac Suppository 10 MG (Bisacodyl) Insert 1 suppository rectally as needed for Constipation 1 Time a Day, If NO BM by AM Day # 4, Day Nurse Gives a Suppository that AM    Sorbitol Solution 70 % Give 30 ml orally as needed for Bowel Protocol for ROSANA/CKD 1 Time a Day, If No BM on by Day # 2, Evening Nurse Gives a Laxative on Day # 2 AND Give 30 ml orally as needed for Bowel Protocol for ROSANA/CKD 1 Time a Day, If No BM on by Day # 3, Evening Nurse Gives a Laxative on Day # 3    MiraLax Powder (Polyethylene Glycol 3350) Give 17 gram orally every 24 hours as needed for constipation mix w/ 4oz fluid of choice    LANTUS SOLOSTAR 100UNITS/ML INJECT 20 UNITS SQ ONCE DAILY *REFRIGERATE UNTIL OPEN, THEN 28 DAYS AT ROOM TEMPERATURE*(Related Diagnoses: TYPE 1 DIABETES MELLITUS WITH KETOACIDOSIS WITHOUT COMA (E10 10))    NOVOLOG FLEXPEN 100UNITS/ML INJECT SQ BEFORE MEALS AND AT BEDTIME PER SLIDING SCALE: 156-195= 1 UNIT, , 196-235= 2 UNITS, 236-275=3 UNITS, 276-315 = 4 UNITS, 316-355= 5 UNITS, 356-390=6 UNITS, 391-410=7 UNITS, >410=8 UNITS , <70 OR >450 =CALL MD AND FOLLOW HYPOGLYCEMIC PROTOCOL *REFRIGERATE UNTIL OPEN, THEN 28 DAYS AT ROOM TEMPERATURE*(Related Diagnoses: TYPE 1 DIABETES MELLITUS WITH KETOACIDOSIS WITHOUT COMA (E10 10))(Additional Directions: INJECT SQ BEFORE MEALS+ AT BEDTIME PER SLIDING SCALE: 156-195= 1 UNIT, 196-235= 2 UNITS, 236-275=3 UNITS, 276-315 = 4 UNITS, 316-355= 5 UNITS, 356-390=6 UNITS, 391-410=7 UNITS,>410=8 UNITS , <70 OR >450 =CALL MD+FOLLOW HYPOGLYCEMIC PROTOCOL)    NOVOLOG FLEXPEN 100UNITS/ML INJECT 8 UNITS SQ THREE TIMES DAILY BEFORE MEALS (HOLD IF SUGAR <70 OR IF NOT EATING) *REFRIGERATE UNTIL OPEN, THEN 28 DAYS AT ROOM TEMPERATURE*(Related Diagnoses: TYPE 1 DIABETES MELLITUS WITH KETOACIDOSIS WITHOUT COMA (E10 10))          Rogelio Verduzco MD  Internal Medicine  Senior Care Physician

## 2023-03-09 ENCOUNTER — NURSING HOME VISIT (OUTPATIENT)
Dept: GERIATRICS | Facility: OTHER | Age: 68
End: 2023-03-09

## 2023-03-09 DIAGNOSIS — I73.9 PERIPHERAL VASCULAR DISEASE, UNSPECIFIED (HCC): ICD-10-CM

## 2023-03-09 DIAGNOSIS — N18.2 TYPE 1 DIABETES MELLITUS WITH STAGE 2 CHRONIC KIDNEY DISEASE (HCC): Primary | ICD-10-CM

## 2023-03-09 DIAGNOSIS — I25.10 CORONARY ARTERY DISEASE WITHOUT ANGINA PECTORIS, UNSPECIFIED VESSEL OR LESION TYPE, UNSPECIFIED WHETHER NATIVE OR TRANSPLANTED HEART: ICD-10-CM

## 2023-03-09 DIAGNOSIS — E78.5 HYPERLIPIDEMIA, UNSPECIFIED HYPERLIPIDEMIA TYPE: ICD-10-CM

## 2023-03-09 DIAGNOSIS — E10.22 TYPE 1 DIABETES MELLITUS WITH STAGE 2 CHRONIC KIDNEY DISEASE (HCC): Primary | ICD-10-CM

## 2023-03-12 ENCOUNTER — TELEPHONE (OUTPATIENT)
Dept: OTHER | Facility: OTHER | Age: 68
End: 2023-03-12

## 2023-03-13 NOTE — TELEPHONE ENCOUNTER
Rashid Espinosa From [facility] Call to informed patient's blood sugar is 496 and would like to increase the unit to 450         Paged the on call provider Via TC

## 2023-03-15 NOTE — ASSESSMENT & PLAN NOTE
Lab Results   Component Value Date    HGBA1C 8 3 (H) 02/09/2023   Noted pt's lunch time sugars higher; thus increased to 10 units lispro for lunch time  Cont 8 units before breakfast & dinner  Cont lantus 20 units SQ daily (pt unable to tolerate increases in lantus)  Cont SSI lispro as recommended by pt's endocrinologist  Please fax pt's sugars to pt's endocrinologist    Optometry Consult and Treat as Needed [facility] , [phone] on 04/28/23 @ 0930 via  for F/u     Endocrinology Consult and Treat- [address] [facility] [address] [phone]  F/U 4/26/23 P/U 0950 via w/c

## 2023-03-15 NOTE — ASSESSMENT & PLAN NOTE
Pt had declined cardiac cath as inpatient  Discussed importance of cardiac cath to help tx pt's cardiac disease  Noted his renal function is back to normal now  Pt declines further w/u & does not wish for further aggressive cardiac care  Defers code status to his sister    Cont asa 81 mg 1 tab po daily & atorvastatin 80 mg 1 tab po QHS

## 2023-05-08 ENCOUNTER — NURSING HOME VISIT (OUTPATIENT)
Dept: GERIATRICS | Facility: OTHER | Age: 68
End: 2023-05-08

## 2023-05-08 DIAGNOSIS — E78.5 HYPERLIPIDEMIA, UNSPECIFIED HYPERLIPIDEMIA TYPE: ICD-10-CM

## 2023-05-08 DIAGNOSIS — N18.2 TYPE 1 DIABETES MELLITUS WITH STAGE 2 CHRONIC KIDNEY DISEASE (HCC): Primary | ICD-10-CM

## 2023-05-08 DIAGNOSIS — I25.10 CORONARY ARTERY DISEASE WITHOUT ANGINA PECTORIS, UNSPECIFIED VESSEL OR LESION TYPE, UNSPECIFIED WHETHER NATIVE OR TRANSPLANTED HEART: ICD-10-CM

## 2023-05-08 DIAGNOSIS — E10.22 TYPE 1 DIABETES MELLITUS WITH STAGE 2 CHRONIC KIDNEY DISEASE (HCC): Primary | ICD-10-CM

## 2023-05-08 DIAGNOSIS — I95.1 ORTHOSTATIC HYPOTENSION: ICD-10-CM

## 2023-05-09 NOTE — PROGRESS NOTES
"[facility]  [address]  [phone]  [facility]  [address]      NAME: Joo Cradona  AGE: 80 y o  SEX: male [id number]    DATE OF ENCOUNTER: 5/8/2023    Assessment and Plan     1  Type 1 diabetes mellitus with stage 2 chronic kidney disease ([address] )        2  Hyperlipidemia, unspecified hyperlipidemia type        3  Coronary artery disease without angina pectoris, unspecified vessel or lesion type, unspecified whether native or transplanted heart        4   Orthostatic hypotension           Diabetes mellitus type 1 (HCC)    Lab Results   Component Value Date    HGBA1C 8 3 (H) 02/09/2023   Cont Lantus 20 units SQ daily  Cont Novolog 8 units before breakfast (hold if sugar <70)  Cont Novolog 10 units before lunch (hold if sugar <70)  Cont Novolog 10 units before dinner (hold if sugar <70)  Cont Novolog extra 3 units if sugar >300    Pt also on Sliding Scale Insulin:  \"INJECT SQ BEFORE MEALS AND AT BEDTIME PER SLIDING SCALE: 156-195= 1 UNIT, , 196-235= 2 UNITS, 236-275=3 UNITS, 276-315 = 4 UNITS, 316-355= 5 UNITS, 356-390=6 UNITS, 391-410=7 UNITS, >410=8 UNITS , <70 OR >450 =CALL MD AND FOLLOW HYPOGLYCEMIC PROTOCOL\"    Pt sees endocrinology on a regular basis  Pt with labile sugars:  109 0 mg/dL 5/8/2023 16:39    5/8/2023 16:38 109 0 mg/dL    5/8/2023 12:33 182 0 mg/dL    5/8/2023 12:32 182 0 mg/dL    5/8/2023 12:32 182 0 mg/dL    5/8/2023 07:44 109 0 mg/dL    5/8/2023 07:44 109 0 mg/dL    5/7/2023 20:41 184 0 mg/dL    5/7/2023 20:40 184 0 mg/dL    5/7/2023 16:06 241 0 mg/dL    5/7/2023 16:06 241 0 mg/dL    5/7/2023 16:05 241 0 mg/dL    5/7/2023 12:33 425 0 mg/dL    5/7/2023 12:32 425 0 mg/dL    5/7/2023 12:31 425 0 mg/dL    5/7/2023 08:12 265 0 mg/dL    5/7/2023 07:43 265 0 mg/dL    5/6/2023 20:17 158 0 mg/dL    5/6/2023 20:16 158 0 mg/dL    5/6/2023 17:17 76 0 mg/dL    5/6/2023 17:14 76 0 mg/dL    5/6/2023 12:40 261 0 mg/dL    5/6/2023 12:39 261 0 mg/dL    5/6/2023 12:38 " 261 0 mg/dL    5/6/2023 08:55 335 0 mg/dL    5/6/2023 08:54 335 0 mg/dL    5/5/2023 21:20 178 0 mg/dL    5/5/2023 21:20 178 0 mg/dL    5/5/2023 16:54 177 0 mg/dL          Hyperlipidemia  Stable  Cont atorvastatin 80 mg 1 tab po QHS    Coronary artery disease without angina pectoris  Pt with hx of NSTEMI in 2022  Pt had Susie Sndyer with moderate sonam-infact ischemia  Ef 55% in 2022  Pt refused any further cardiac w/u  Pt saw cardiology Jan 2023  Cont medical management  Keep yearly f/u with cardiology  Cont asa, statin      Orthostatic hypotension    5/5/2023 17:11 128 / 61 mmHg   Reviewed trend  BP controlled off meds  Pt no longer on midodrine as in the past       Code status: Full    Chief Complaint     Routine Long term follow-up visit  History of Present Illness     Pt seen and examined  Pt sitting in [facility]  No acute complaints  Pt sees endocrinologist periodically  The following portions of the patient's history were reviewed and updated as appropriate: allergies, current medications, past family history, past medical history, past social history, past surgical history and problem list     Review of Systems     Review of Systems   Constitutional: Negative for chills and fever  All other systems reviewed and are negative        Active Problem List     Patient Active Problem List   Diagnosis   • ROSANA (acute kidney injury) ([facility] )   • Ambulatory dysfunction   • Blurry vision, right eye   • Hyperlipidemia   • Hyperlipidemia associated with type 2 diabetes mellitus ([facility] )   • 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[facility] )   • Orthostatic hypotension   • Peripheral vascular disease, unspecified ([facility] )   • Diabetes mellitus type 1 ([facility] )   • Sinus tachycardia   • Anxiety   • Reactive depression   • Hypoglycemia associated with diabetes ([facility] )   • Coronary artery disease without angina pectoris   • Acute kidney injury superimposed on chronic kidney disease (HCC)   • History of DVT (deep vein thrombosis)   • Hyperlipidemia due to type 1 diabetes mellitus (HCC)   • Hyponatremia   • NSTEMI (non-ST elevated myocardial infarction) ([facility])   • Other constipation   • Urinary retention   • CKD (chronic kidney disease) stage 2, GFR 60-89 ml/min   • Vomiting       Objective     Vitals: Reviewed in [employer] system  VSS  General: Awake, alert & oriented x 2-3  Head: atraumatic, normocephalic  Cardiovascular: RRR  Lungs: Clear to auscultation bilaterally  Abdomen: nontender/nondistended, +BS  Legs: no cyanosis, clubbing or edema  Skin: clean, dry, intact  Psych: calm, cooperative    Pertinent Laboratory/Diagnostic Studies:  Refer to facility chart  Current Medications   Medications reviewed and updated in facility chart      Kerri Dumont MD  Internal Medicine  Senior Care Physician

## 2023-05-14 NOTE — ASSESSMENT & PLAN NOTE
5/5/2023 17:11 128 / 61 mmHg   Reviewed trend  BP controlled off meds  Pt no longer on midodrine as in the past

## 2023-05-14 NOTE — ASSESSMENT & PLAN NOTE
"  Lab Results   Component Value Date    HGBA1C 8 3 (H) 02/09/2023   Cont Lantus 20 units SQ daily  Cont Novolog 8 units before breakfast (hold if sugar <70)  Cont Novolog 10 units before lunch (hold if sugar <70)  Cont Novolog 10 units before dinner (hold if sugar <70)  Cont Novolog extra 3 units if sugar >300    Pt also on Sliding Scale Insulin:  \"INJECT SQ BEFORE MEALS AND AT BEDTIME PER SLIDING SCALE: 156-195= 1 UNIT, , 196-235= 2 UNITS, 236-275=3 UNITS, 276-315 = 4 UNITS, 316-355= 5 UNITS, 356-390=6 UNITS, 391-410=7 UNITS, >410=8 UNITS , <70 OR >450 =CALL MD AND FOLLOW HYPOGLYCEMIC PROTOCOL\"    Pt sees endocrinology on a regular basis  Pt with labile sugars:  109 0 mg/dL 5/8/2023 16:39    5/8/2023 16:38 109 0 mg/dL    5/8/2023 12:33 182 0 mg/dL    5/8/2023 12:32 182 0 mg/dL    5/8/2023 12:32 182 0 mg/dL    5/8/2023 07:44 109 0 mg/dL    5/8/2023 07:44 109 0 mg/dL    5/7/2023 20:41 184 0 mg/dL    5/7/2023 20:40 184 0 mg/dL    5/7/2023 16:06 241 0 mg/dL    5/7/2023 16:06 241 0 mg/dL    5/7/2023 16:05 241 0 mg/dL    5/7/2023 12:33 425 0 mg/dL    5/7/2023 12:32 425 0 mg/dL    5/7/2023 12:31 425 0 mg/dL    5/7/2023 08:12 265 0 mg/dL    5/7/2023 07:43 265 0 mg/dL    5/6/2023 20:17 158 0 mg/dL    5/6/2023 20:16 158 0 mg/dL    5/6/2023 17:17 76 0 mg/dL    5/6/2023 17:14 76 0 mg/dL    5/6/2023 12:40 261 0 mg/dL    5/6/2023 12:39 261 0 mg/dL    5/6/2023 12:38 261 0 mg/dL    5/6/2023 08:55 335 0 mg/dL    5/6/2023 08:54 335 0 mg/dL    5/5/2023 21:20 178 0 mg/dL    5/5/2023 21:20 178 0 mg/dL    5/5/2023 16:54 177 0 mg/dL        "

## 2023-05-14 NOTE — ASSESSMENT & PLAN NOTE
Pt with hx of NSTEMI in 2022  Pt had Genella Loose with moderate sonam-infact ischemia  Ef 55% in 2022  Pt refused any further cardiac w/u  Pt saw cardiology Jan 2023  Cont medical management  Keep yearly f/u with cardiology  Cont asa, statin

## 2023-06-05 ENCOUNTER — NURSING HOME VISIT (OUTPATIENT)
Dept: GERIATRICS | Facility: OTHER | Age: 68
End: 2023-06-05
Payer: MEDICARE

## 2023-06-05 DIAGNOSIS — E78.5 HYPERLIPIDEMIA ASSOCIATED WITH TYPE 2 DIABETES MELLITUS (HCC): ICD-10-CM

## 2023-06-05 DIAGNOSIS — I95.1 ORTHOSTATIC HYPOTENSION: ICD-10-CM

## 2023-06-05 DIAGNOSIS — R35.0 URINARY FREQUENCY: Primary | ICD-10-CM

## 2023-06-05 DIAGNOSIS — E78.5 HYPERLIPIDEMIA DUE TO TYPE 1 DIABETES MELLITUS (HCC): ICD-10-CM

## 2023-06-05 DIAGNOSIS — C96.6 LANGERHAN'S CELL HISTIOCYTOSIS (HCC): ICD-10-CM

## 2023-06-05 DIAGNOSIS — N18.2 CKD (CHRONIC KIDNEY DISEASE) STAGE 2, GFR 60-89 ML/MIN: ICD-10-CM

## 2023-06-05 DIAGNOSIS — K59.09 OTHER CONSTIPATION: ICD-10-CM

## 2023-06-05 DIAGNOSIS — Z86.718 HISTORY OF DVT (DEEP VEIN THROMBOSIS): ICD-10-CM

## 2023-06-05 DIAGNOSIS — E11.69 HYPERLIPIDEMIA ASSOCIATED WITH TYPE 2 DIABETES MELLITUS (HCC): ICD-10-CM

## 2023-06-05 DIAGNOSIS — I25.10 CORONARY ARTERY DISEASE WITHOUT ANGINA PECTORIS, UNSPECIFIED VESSEL OR LESION TYPE, UNSPECIFIED WHETHER NATIVE OR TRANSPLANTED HEART: ICD-10-CM

## 2023-06-05 DIAGNOSIS — E10.69 HYPERLIPIDEMIA DUE TO TYPE 1 DIABETES MELLITUS (HCC): ICD-10-CM

## 2023-06-05 PROBLEM — E11.649 HYPOGLYCEMIA ASSOCIATED WITH DIABETES (HCC): Status: RESOLVED | Noted: 2021-04-30 | Resolved: 2023-06-05

## 2023-06-05 PROCEDURE — 99309 SBSQ NF CARE MODERATE MDM 30: CPT

## 2023-06-05 RX ORDER — SENNA PLUS 8.6 MG/1
1 TABLET ORAL
COMMUNITY

## 2023-06-05 NOTE — PROGRESS NOTES
MONTHLY VISIT  Location: [facility]   POS: 32 LTC    NAME: Guerda Knowles  AGE: 80 y o  SEX: male    DATE OF ENCOUNTER: 6/5/2023    ASSESSMENT AND PROBLEMS:  1  Urinary frequency  Assessment & Plan:  Reports frequency as well as at night impacting sleep  Possibility of BPH  Check PVR x48 hour      2  Other constipation  Assessment & Plan:  Reports constipation  Add Senna 1 tab QHS - hold for loose stools  Continue docusate and PRNs      3  Coronary artery disease without angina pectoris, unspecified vessel or lesion type, unspecified whether native or transplanted heart  Assessment & Plan:  Hx of NSTEMI  Follows with cardiology in 1 year  Continue aspirin, statin   Continue with BP, DM, and HLD control      4  Hyperlipidemia due to type 1 diabetes mellitus (HCC)  Assessment & Plan:  Stable  Continue Lipitor  Continue annual lipid panel  Continue BP and DM management      5  History of DVT (deep vein thrombosis)  Assessment & Plan:  Stable  Hx of unprovoked VTE requiring life long AC  Continue Eliquis  Follows with heme/onc - continue annual visit      6  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[facility] )  Assessment & Plan:  Stable  Follows with [facility] heme/onc      7  CKD (chronic kidney disease) stage 2, GFR 60-89 ml/min  Assessment & Plan:  Stable  4/6 BMP: BUN 18, creatinine 0 94, EGFR 89  Urine creatinine 68 4, urine albumin less than 0 7  Continue to monitor routine labs  Continue CBC, BMP Q3 months  Monitor DM      8  Orthostatic hypotension  Assessment & Plan:  Stable  Off midodrine, BP and symptoms stable      9   Hyperlipidemia associated with type 2 diabetes mellitus ([facility] )  Assessment & Plan:  Stable  Continue statin  Continue annual lipid panel  Continue DM management and monitor BP              CHIEF COMPLAINT:  Monthly Visit- routine 30 day     HISTORY OF PRESENT ILLNESS:  History taken from patient, staff, chart     Guerda Knowles is a 67-year-old male LTC resident of [facility] with [address] including "but not limited to DM1, HLD, CAD, NSTEMI, CKD, DVT, seen today for monthly follow up  Continues with ongoing supportive care at [address]  Reports he is overall doing well but does report difficulty falling and maintaining sleep for some time  He does report a degree or urinary frequency which also sometimes wakes him up at night despite voiding prior to bed  Reports he is eating and drinking \"okay\", depends on food and amount  Denies any SOB or CP  Continues on Eliquis for DVT history  LTC chart reviewed, appetite good eating approximately 75 to 100% of meals, last BM 6/5, weight remains stable  REVIEW OF SYSTEMS:  Complete ROS negative other than as stated in HPI    HISTORY:  Medical Hx: Reviewed, unchanged  Family Hx: Reviewed, unchanged  Soc Hx: Reviewed, unchanged  No Known Allergies    PHYSICAL EXAM:  Vital Signs: /72, temp 98 4, HR 76, RR 20, O2 100%  Weight 201 9 pounds    General: NAD, laying in bed   Eye Exam: anicteric sclera, no discharge  ENT: moist mucous membranes  Cardiovascular: regular rate, regular rhythm, no murmurs, rubs, or gallops  Pulmonary: no wheeze, no rhonchi, unlabored, CTA, RA  Abdominal: soft, nontender, nondistended, bowel sounds audible  Neurological: Awake, alert, oriented x4, forgetful     Skin: No significant lesions appreciated, no significant rashes appreciated  Psych: Pleasant, cooperative, limited insight    PERTINENT LABS/IMAGING DATA:    4/6/23 BMP: Glucose 120, BUN 18, creatinine 0 94, sodium 142, potassium 3 8, calcium 8 4, EGFR 89  Lipid panel: Cholesterol 109, triglycerides 61, HDL 44, LDL 53  TSH: 1 54  Hemoglobin A1c 8 5    Albumin/creatinine ratio: Urine creatinine 68 4, urine albumin less than 0 7    2/9/2023 lipid panel: Cholesterol 105, triglyceride 85, HDL 45, LDL 43  Vitamin D: 37  CBC: Hemoglobin 10 8, WBC 4 5, platelet 013  TSH: 3 66  Hemoglobin A1c 8 3  CMP: Glucose 287, BUN 20, creatinine 1 01, sodium 138, potassium 4 1, albumin 3 3, total protein " 6 0, EGFR 82      CURRENT MEDICATIONS:  All medications reviewed and updated in Nursing Home EMR      Provider: Mercedes TURCIOS  Patient: Lilly Ravi   6/5/2023

## 2023-06-06 NOTE — ASSESSMENT & PLAN NOTE
Hx of NSTEMI  Follows with cardiology in 1 year  Continue aspirin, statin   Continue with BP, DM, and HLD control

## 2023-06-06 NOTE — ASSESSMENT & PLAN NOTE
Stable  4/6 BMP: BUN 18, creatinine 0 94, EGFR 89  Urine creatinine 68 4, urine albumin less than 0 7  Continue to monitor routine labs  Continue CBC, BMP Q3 months  Monitor DM

## 2023-06-06 NOTE — ASSESSMENT & PLAN NOTE
Stable  Hx of unprovoked VTE requiring life long AC  Continue Eliquis  Follows with heme/onc - continue annual visit

## 2023-06-06 NOTE — ASSESSMENT & PLAN NOTE
Blood sugar trends reviewed  Blood sugars labile ranging anywhere from 84- 375 this past week,   Lantus 20 units daily, NovoLog 8 units with breakfast, NovoLog 10 units with lunch and dinner, NovoLog 3 units as needed for blood sugar greater than 300  ISS  Continue CCD diet   Most recent HA1C 8 3 on 2/9/2023  Continue HA1C, CBC, BMP Q3 months  Continue activity as tolerated   Monitor for renal and cardiovascular comorbidity- monitor routine labs   Avoid hypo/hyperglycemia  Following with [facility] endocrinology

## 2023-06-26 ENCOUNTER — NURSING HOME VISIT (OUTPATIENT)
Dept: GERIATRICS | Facility: OTHER | Age: 68
End: 2023-06-26

## 2023-06-26 DIAGNOSIS — E10.65 TYPE 1 DIABETES MELLITUS WITH HYPERGLYCEMIA (HCC): Primary | ICD-10-CM

## 2023-06-27 NOTE — PROGRESS NOTES
[facility]  [address]  [phone]  [facility]  POS 32      NAME: Manfred Muller  AGE: 80 y o  SEX: male [id number]    DATE OF ENCOUNTER: 6/26/2023    Assessment and Plan     1  Type 1 diabetes mellitus with hyperglycemia (HCC)           Type 1 diabetes mellitus with hyperglycemia (HCC)  Pt with chronically labile sugars  Pt sees [facility] endocrinology  Lab Results   Component Value Date    HGBA1C 8 5 (H) 04/06/2023   Pt had low sugar 57 & was lethargic  56 0 mg/dL 6/26/2023 16:25     6/26/2023 16:24 56 0 mg/dL     6/26/2023 16:22 56 0 mg/dL     6/26/2023 12:11 132 0 mg/dL     6/26/2023 12:01 132 0 mg/dL     6/26/2023 12:01 132 0 mg/dL     6/26/2023 07:28 73 0 mg/dL     6/26/2023 07:27 73 0 mg/dL     6/25/2023 20:17 155 0 mg/dL     6/25/2023 18:04 298 0 mg/dL     6/25/2023 18:04 298 0 mg/dL     6/25/2023 16:16 298 0 mg/dL     6/25/2023 12:49 197 0 mg/dL     6/25/2023 12:48 197 0 mg/dL     6/25/2023 12:48 197 0 mg/dL     6/25/2023 09:22 104 0 mg/dL     6/25/2023 07:35 104 0 mg/dL     6/24/2023 22:00 139 0 mg/dL     6/24/2023 21:59 139 0 mg/dL     6/24/2023 17:45 142 0 mg/dL     6/24/2023 17:41 142 0 mg/dL     6/24/2023 17:40 142 0 mg/dL     6/24/2023 12:55 259 0 mg/dL         Hold insulin today  Lower lantus to 17 units daily  Cont novolog tomorrow 8 units with breakfast, 10 units with lunch dinner if sugar   >70  Fax endocrinology blood sugar trend  Avoid hypoglycemia       Code status: full    Chief Complaint     Acute visit  History of Present Illness   Notified by RN pt's sugar 57  RN reports pt was lethargic  Pt given OJ & peanut butter with crackers  Pt says not enough carb in his breakfast  Will hold rest of insulin tonight & re-eval diet with dietician      The following portions of the patient's history were reviewed and updated as appropriate: allergies, current medications, past family history, past medical history, past social history, past surgical history and problem list     Review of Systems     Review of Systems   Constitutional: Negative for appetite change  All other systems reviewed and are negative  Active Problem List     Patient Active Problem List   Diagnosis   • ROSANA (acute kidney injury) ([facility] )   • Ambulatory dysfunction   • Blurry vision, right eye   • Hyperlipidemia   • Hyperlipidemia associated with type 2 diabetes mellitus ([facility] )   • 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[facility] )   • Orthostatic hypotension   • Peripheral vascular disease, unspecified ([facility])   • Type 1 diabetes mellitus with hyperglycemia ([facility])   • Sinus tachycardia   • Anxiety   • Reactive depression   • Coronary artery disease without angina pectoris   • Acute kidney injury superimposed on chronic kidney disease ([facility])   • History of DVT (deep vein thrombosis)   • Hyperlipidemia due to type 1 diabetes mellitus ([place] [address] )   • Hyponatremia   • NSTEMI (non-ST elevated myocardial infarction) ([facility])   • Other constipation   • Urinary retention   • CKD (chronic kidney disease) stage 2, GFR 60-89 ml/min   • Vomiting   • Urinary frequency       Objective     Vitals: Reviewed in [employer] system  VSS    General: Awake, alert and oriented  Head: atraumatic, normocephalic  Cardiovascular: RRR  Lungs: Clear to auscultation bilaterally  Abdomen: nontender/nondistended, +BS  Legs: no cyanosis, clubbing or edema  Skin: clean, dry, intact  Psych: calm, cooperative    Pertinent Laboratory/Diagnostic Studies:  Refer to facility chart  Current Medications   Medications reviewed and updated in facility chart    Total time spent: 10 minutes    Chris Martini MD  Internal Medicine  Senior Care Physician

## 2023-07-02 NOTE — ASSESSMENT & PLAN NOTE
Pt with chronically labile sugars  Pt sees [facility] endocrinology  Lab Results   Component Value Date    HGBA1C 8 5 (H) 04/06/2023   Pt had low sugar 57 & was lethargic  56 0 mg/dL 6/26/2023 16:25     6/26/2023 16:24 56 0 mg/dL     6/26/2023 16:22 56 0 mg/dL     6/26/2023 12:11 132 0 mg/dL     6/26/2023 12:01 132 0 mg/dL     6/26/2023 12:01 132 0 mg/dL     6/26/2023 07:28 73 0 mg/dL     6/26/2023 07:27 73 0 mg/dL     6/25/2023 20:17 155 0 mg/dL     6/25/2023 18:04 298 0 mg/dL     6/25/2023 18:04 298 0 mg/dL     6/25/2023 16:16 298 0 mg/dL     6/25/2023 12:49 197 0 mg/dL     6/25/2023 12:48 197 0 mg/dL     6/25/2023 12:48 197 0 mg/dL     6/25/2023 09:22 104 0 mg/dL     6/25/2023 07:35 104 0 mg/dL     6/24/2023 22:00 139 0 mg/dL     6/24/2023 21:59 139 0 mg/dL     6/24/2023 17:45 142 0 mg/dL     6/24/2023 17:41 142 0 mg/dL     6/24/2023 17:40 142 0 mg/dL     6/24/2023 12:55 259 0 mg/dL         Hold insulin today  Lower lantus to 17 units daily  Cont novolog tomorrow 8 units with breakfast, 10 units with lunch dinner if sugar   >70  Fax endocrinology blood sugar trend  Avoid hypoglycemia

## 2023-07-06 ENCOUNTER — NURSING HOME VISIT (OUTPATIENT)
Dept: GERIATRICS | Facility: OTHER | Age: 68
End: 2023-07-06
Payer: MEDICARE

## 2023-07-06 DIAGNOSIS — E10.69 HYPERLIPIDEMIA DUE TO TYPE 1 DIABETES MELLITUS (HCC): ICD-10-CM

## 2023-07-06 DIAGNOSIS — E78.5 HYPERLIPIDEMIA DUE TO TYPE 1 DIABETES MELLITUS (HCC): ICD-10-CM

## 2023-07-06 DIAGNOSIS — I73.9 PERIPHERAL VASCULAR DISEASE, UNSPECIFIED (HCC): ICD-10-CM

## 2023-07-06 DIAGNOSIS — E10.65 TYPE 1 DIABETES MELLITUS WITH HYPERGLYCEMIA (HCC): Primary | ICD-10-CM

## 2023-07-06 DIAGNOSIS — I25.10 CORONARY ARTERY DISEASE WITHOUT ANGINA PECTORIS, UNSPECIFIED VESSEL OR LESION TYPE, UNSPECIFIED WHETHER NATIVE OR TRANSPLANTED HEART: ICD-10-CM

## 2023-07-06 PROCEDURE — 99309 SBSQ NF CARE MODERATE MDM 30: CPT | Performed by: INTERNAL MEDICINE

## 2023-07-06 NOTE — PROGRESS NOTES
[address]  [phone]  [facility] -  CAMPUS SNF  POS 32      NAME: Janel Matt  AGE: 80 y.o. SEX: male [phone]    DATE OF ENCOUNTER: 7/6/2023    Assessment and Plan     1. Type 1 diabetes mellitus with hyperglycemia (HCC)        2. Hyperlipidemia due to type 1 diabetes mellitus ([address] )        3. Peripheral vascular disease, unspecified ([address])        4.  Coronary artery disease without angina pectoris, unspecified vessel or lesion type, unspecified whether native or transplanted heart           Type 1 diabetes mellitus with hyperglycemia (HCC)  Stable  7/6/2023 Hga1c 7.9  185.0 mg/dL      7/7/2023 20:10 185.0 mg/dL     7/7/2023 18:47 131.0 mg/dL     7/7/2023 18:08 131.0 mg/dL     7/7/2023 16:10 131.0 mg/dL     7/7/2023 12:48 274.0 mg/dL     7/7/2023 12:42 274.0 mg/dL     7/7/2023 12:41 274.0 mg/dL     7/7/2023 09:07 234.0 mg/dL     7/7/2023 07:44 234.0 mg/dL     7/6/2023 20:46 155.0 mg/dL     7/6/2023 20:46 155.0 mg/dL     7/6/2023 16:57 186.0 mg/dL     7/6/2023 16:49 186.0 mg/dL     7/6/2023 16:46 186.0 mg/dL     7/6/2023 12:16 312.0 mg/dL     7/6/2023 12:15 312.0 mg/dL     7/6/2023 12:15 312.0 mg/dL     7/6/2023 07:30 140.0 mg/dL     7/6/2023 07:30 140.0 mg/dL     7/5/2023 20:18 135.0 mg/dL     7/5/2023 20:17 135.0 mg/dL     7/5/2023 19:02 118.0 mg/dL     7/5/2023 16:11 116.0 mg/dL     7/5/2023 16:10 116.0 mg/dL     Pt on ssi before meals & qhs as per his endocrinologist  Pt also on scheduled premeal insulin:  Cont 8 units novolog before breakfast & 10 units before lunch/dinner  Cont lantus 17 units SQ at noon      Hyperlipidemia due to type 1 diabetes mellitus (HCC)  stable  Cont atorvastatin 80 mg 1 tab po QHS    Peripheral vascular disease, unspecified (HCC)  Stable  Cont aspirin 81 mg 1 tab po daily  Cont atorvastatin 80 mg 1 tab po QHS    Coronary artery disease without angina pectoris  Stable  Cont aspirin 81 mg 1 tab po daily  Cont atorvastatin 80 mg 1 tab po QHS       Code status: full    Chief Complaint     Long term follow-up visit. 60 day visit    History of Present Illness   Pt seen and examined. Pt's sugars better now. Pt wanting to go up on his insulin. Explained pt's endocrinologist is managing his sugars and we have faxed to her office. Pt gets hypoglycemic if his lantus is above 20 units. Pt denies any fever, chills, nausea, vomiting. Pt eats % of his meals most of time. He has regular BM. The following portions of the patient's history were reviewed and updated as appropriate: allergies, current medications, past family history, past medical history, past social history, past surgical history and problem list.    Review of Systems     Review of Systems   Constitutional: Negative for chills and fever. Gastrointestinal: Negative for nausea and vomiting. All other systems reviewed and are negative. Active Problem List     Patient Active Problem List   Diagnosis   • ROSANA (acute kidney injury) ([address])   • Ambulatory dysfunction   • Blurry vision, right eye   • Hyperlipidemia   • Hyperlipidemia associated with type 2 diabetes mellitus ([address])   • 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[address])   • Orthostatic hypotension   • Peripheral vascular disease, unspecified (HCC)   • Type 1 diabetes mellitus with hyperglycemia (HCC)   • Sinus tachycardia   • Anxiety   • Reactive depression   • Coronary artery disease without angina pectoris   • Acute kidney injury superimposed on chronic kidney disease (HCC)   • History of DVT (deep vein thrombosis)   • Hyperlipidemia due to type 1 diabetes mellitus ([address])   • Hyponatremia   • NSTEMI (non-ST elevated myocardial infarction) ([facility])   • Other constipation   • Urinary retention   • CKD (chronic kidney disease) stage 2, GFR 60-89 ml/min   • Vomiting   • Urinary frequency       Objective     Vitals: Reviewed in [facility] system.  VSS    Weight: 200 lbs; overall stable  1.9 pound weight loss    General: Awake, alert & oriented  Head: atraumatic, normocephalic  Cardiovascular: RRR  Lungs: Clear to auscultation bilaterally  Abdomen: nontender/nondistended, +BS  Legs: no cyanosis, clubbing or edema  Skin: clean, dry, intact  Psych: calm, cooperative    Pertinent Laboratory/Diagnostic Studies:  Refer to facility chart. Current Medications   Medications reviewed and updated in facility chart.     Acetaminophen Tablet 325 MG     Acetaminophen Suppository 650 MG Insert 1 suppository rectally every 4 hours as needed for Mild Pain Max 3 GM APAP / 24 HR, Give Suppository If Unable to Take By Mouth AND Insert 1 suppository rectally every 4 hours as needed for Temperature = or > 100 degrees Oral / 101 degrees Rectal Max 3 GM APAP / 24 HR, Give Suppository If Unable to Take By Mouth    ELIQUIS 2.5MG TABLET GIVE 1 TABLET BY MOUTH TWICE DAILY FOR DEEP VEIN THROMBOSIS(Related Diagnoses: PERSONAL HISTORY OF OTHER VENOUS THROMBOSIS AND EMBOLISM (Z86.718))(Indications for Use: DVT)    ASPIRIN 81MG LOW DOSE CHEW TAB Give 1 tablet orally one time a day for CAD(Related Diagnoses: ATHEROSCLEROTIC HEART DISEASE OF NATIVE CORONARY ARTERY WITHOUT ANGINA PECTORIS (I25.10))    ATORVASTATIN 80MG TABLET GIVE 1 TABLET BY MOUTH ONCE DAILY WITH DINNER AT 1700 FOR HYPERLIPIDEMIA(Related Diagnoses: HYPERLIPIDEMIA, UNSPECIFIED (E78.5))    CALCIUM+D3 600MG-200U TABLET GIVE 1 TABLET BY MOUTH TWICE DAILY FOR VITAMIN DEFICIENCY(Related Diagnoses: VITAMIN D DEFICIENCY, UNSPECIFIED (E55.9))(Indications for Use: vit. def.)    DOCUSATE 100MG CAPSULE Give 1 capsule orally two times a day for constipation(Related Diagnoses: CONSTIPATION, UNSPECIFIED (K59.00))    DORZOLAMIDE/TIMOLOL OPTH SOLN INSTILL ONE DROP INTO RIGHT EYE TWICE DAILY FOR GLAUCOMA(Related Diagnoses: TYPE 1 DIABETES MELLITUS WITH PROLIFERATIVE DIABETIC RETINOPATHY WITH MACULAR EDEMA, BILATERAL (E10.3513))    LATANOPROST 0.005% OPTH SOLN INSTILL ONE DROP INTO RIGHT EYE ONCE DAILY AT BEDTIME FOR GLAUCOMA *REFRIGERATE UNTIL OPEN, THEN 6 WEEKS AT ROOM TEMPERATURE*(Related Diagnoses: TYPE 1 DIABETES MELLITUS WITH PROLIFERATIVE DIABETIC RETINOPATHY WITH MACULAR EDEMA, BILATERAL (D79.9055))    Enema Disposable Enema (Sodium Phosphates) Insert 1 application rectally as needed for Constipation 1 Time a Day, If NO BM within 3-5 Hours of Suppository Insertion Give Enema    Bisac-Evac Suppository 10 MG (Bisacodyl) Insert 1 suppository rectally as needed for Constipation 1 Time a Day, If NO BM by AM Day # 4, Day Nurse Gives a Suppository that AM    Sorbitol Solution 70 % Give 30 ml orally as needed for Bowel Protocol for ROSANA/CKD 1 Time a Day, If No BM on by Day # 2, Evening Nurse Gives a Laxative on Day # 2 AND Give 30 ml orally as needed for Bowel Protocol for ROSANA/CKD 1 Time a Day, If No BM on by Day # 3, Evening Nurse Gives a Laxative on Day # 3    MiraLax Powder (Polyethylene Glycol 3350) Give 17 gram orally every 24 hours as needed for constipation mix w/ 4oz fluid of choice    NOVOLOG FLEXPEN 100UNITS/ML INJECT SQ BEFORE MEALS AND AT BEDTIME PER SLIDING SCALE: 156-195= 1 UNIT, , 196-235= 2 UNITS, 236-275=3 UNITS, 276-315 = 4 UNITS, 316-355= 5 UNITS, 356-390=6 UNITS, 391-410=7 UNITS, >410=8 UNITS , <70 OR >450 =CALL MD AND FOLLOW HYPOGLYCEMIC PROTOCOL *REFRIGERATE UNTIL OPEN, THEN 28 DAYS AT ROOM TEMPERATURE*(Related Diagnoses: TYPE 1 DIABETES MELLITUS WITH KETOACIDOSIS WITHOUT COMA (E10.10))(Additional Directions: INJECT SQ BEFORE MEALS+ AT BEDTIME PER SLIDING SCALE: 156-195= 1 UNIT, 196-235= 2 UNITS, 236-275=3 UNITS, 276-315 = 4 UNITS, 316-355= 5 UNITS, 356-390=6 UNITS, 391-410=7 UNITS,>410=8 UNITS , <70 OR >450 =CALL MD+FOLLOW HYPOGLYCEMIC PROTOCOL)    NOVOLOG FLEXPEN 100UNITS/ML INJECT 3 UNITS SQ AS NEEDED FOR IF BLOOD SUGAR >300 *REFRIGERATE UNTIL OPEN, THEN 28 DAYS AT ROOM TEMPERATURE*(Indications for Use: Administer for BS greater than 300)    NOVOLOG FLEXPEN 100UNITS/ML INJECT 8 UNITS SQ EVERY MORNING AT 0800 *HOLD FOR BLOOD SUGAR <70MG/DL OR IF RESIDENT DIDN'T EAT* *REFRIGERATE UNTIL OPEN, THEN 28 DAYS AT ROOM TEMPERATURE*(Related Diagnoses: TYPE 1 DIABETES MELLITUS WITH PROLIFERATIVE DIABETIC RETINOPATHY WITH MACULAR EDEMA, BILATERAL (E10.3513))(Additional Directions: INJECT 8 UNITS SQ EVERY MORNING AT 0800 *HOLD FOR BLOOD SUGAR <70MG/DL OR IF RESIDENT DIDN'T EAT* *REFRIGERATE UNTIL OPEN, THEN 28 DAYS AT ROOM TEMPERATURE*) AND INJECT 10 UNITS SQ TWICE DAILY BEFORE LUNCH AT 1200 AND BEFORE DINNER AT 1700 *HOLD FOR BLOOD SUGAR <70MG/DL OR IF RESIDENT DIDNT EAT *REFRIGERATE UNTIL OPEN, THEN 28 DAYS AT ROOM TEMPERATURE*(Related Diagnoses: TYPE 1 DIABETES MELLITUS WITH PROLIFERATIVE DIABETIC RETINOPATHY WITH MACULAR EDEMA, BILATERAL (E10.3513))    SENNA 8.6MG TABLET GIVE 1 TABLET BY MOUTH ONCE DAILY AT BEDTIME * HOLD FOR LOOSE STOOLS(Indications for Use: constipation)    LANTUS SOLOSTAR 100UNITS/ML INJECT 17 UNITS SQ ONCE DAILY AT NOON (HOLD IF BLOOD SUGAR <70) *REFRIGERATE UNTIL OPEN, THEN 28 DAYS AT ROOM TEMPERATURE*(Indications for Use: Diabetes)(Additional Directions: Hold for blood sugar <70)          Vincent Ceballos MD  Internal Medicine  Senior Care Physician

## 2023-07-08 NOTE — ASSESSMENT & PLAN NOTE
Stable  7/6/2023 Hga1c 7.9  185.0 mg/dL      7/7/2023 20:10 185.0 mg/dL     7/7/2023 18:47 131.0 mg/dL     7/7/2023 18:08 131.0 mg/dL     7/7/2023 16:10 131.0 mg/dL     7/7/2023 12:48 274.0 mg/dL     7/7/2023 12:42 274.0 mg/dL     7/7/2023 12:41 274.0 mg/dL     7/7/2023 09:07 234.0 mg/dL     7/7/2023 07:44 234.0 mg/dL     7/6/2023 20:46 155.0 mg/dL     7/6/2023 20:46 155.0 mg/dL     7/6/2023 16:57 186.0 mg/dL     7/6/2023 16:49 186.0 mg/dL     7/6/2023 16:46 186.0 mg/dL     7/6/2023 12:16 312.0 mg/dL     7/6/2023 12:15 312.0 mg/dL     7/6/2023 12:15 312.0 mg/dL     7/6/2023 07:30 140.0 mg/dL     7/6/2023 07:30 140.0 mg/dL     7/5/2023 20:18 135.0 mg/dL     7/5/2023 20:17 135.0 mg/dL     7/5/2023 19:02 118.0 mg/dL     7/5/2023 16:11 116.0 mg/dL     7/5/2023 16:10 116.0 mg/dL     Pt on ssi before meals & qhs as per his endocrinologist  Pt also on scheduled premeal insulin:  Cont 8 units novolog before breakfast & 10 units before lunch/dinner  Cont lantus 17 units SQ at noon

## 2023-07-30 ENCOUNTER — TELEPHONE (OUTPATIENT)
Dept: OTHER | Facility: OTHER | Age: 68
End: 2023-07-30

## 2023-07-31 ENCOUNTER — NURSING HOME VISIT (OUTPATIENT)
Dept: GERIATRICS | Facility: OTHER | Age: 68
End: 2023-07-31
Payer: MEDICARE

## 2023-07-31 DIAGNOSIS — E10.65 TYPE 1 DIABETES MELLITUS WITH HYPERGLYCEMIA (HCC): Primary | ICD-10-CM

## 2023-07-31 DIAGNOSIS — Z86.718 HISTORY OF DVT (DEEP VEIN THROMBOSIS): ICD-10-CM

## 2023-07-31 DIAGNOSIS — N18.2 CKD (CHRONIC KIDNEY DISEASE) STAGE 2, GFR 60-89 ML/MIN: ICD-10-CM

## 2023-07-31 DIAGNOSIS — R35.0 URINARY FREQUENCY: ICD-10-CM

## 2023-07-31 DIAGNOSIS — I25.10 CORONARY ARTERY DISEASE WITHOUT ANGINA PECTORIS, UNSPECIFIED VESSEL OR LESION TYPE, UNSPECIFIED WHETHER NATIVE OR TRANSPLANTED HEART: ICD-10-CM

## 2023-07-31 DIAGNOSIS — K59.09 OTHER CONSTIPATION: ICD-10-CM

## 2023-07-31 DIAGNOSIS — E78.5 HYPERLIPIDEMIA, UNSPECIFIED HYPERLIPIDEMIA TYPE: ICD-10-CM

## 2023-07-31 PROBLEM — R33.9 URINARY RETENTION: Status: RESOLVED | Noted: 2018-08-29 | Resolved: 2023-07-31

## 2023-07-31 PROBLEM — R11.10 VOMITING: Status: RESOLVED | Noted: 2023-01-18 | Resolved: 2023-07-31

## 2023-07-31 PROBLEM — I25.2 HX OF NON-ST ELEVATION MYOCARDIAL INFARCTION (NSTEMI): Status: ACTIVE | Noted: 2023-07-31

## 2023-07-31 PROBLEM — E11.69 HYPERLIPIDEMIA ASSOCIATED WITH TYPE 2 DIABETES MELLITUS (HCC): Status: RESOLVED | Noted: 2020-01-17 | Resolved: 2023-07-31

## 2023-07-31 PROBLEM — E87.1 HYPONATREMIA: Status: RESOLVED | Noted: 2022-10-18 | Resolved: 2023-07-31

## 2023-07-31 PROBLEM — I21.4 NSTEMI (NON-ST ELEVATED MYOCARDIAL INFARCTION) (HCC): Status: RESOLVED | Noted: 2022-10-18 | Resolved: 2023-07-31

## 2023-07-31 PROCEDURE — 99309 SBSQ NF CARE MODERATE MDM 30: CPT

## 2023-07-31 NOTE — PROGRESS NOTES
MONTHLY VISIT  Location: [facility]   POS: [address] [facility]    NAME: Carroll Rea  AGE: 80 y.o. SEX: male    DATE OF ENCOUNTER: 7/31/2023    ASSESSMENT AND PROBLEMS:  1. Type 1 diabetes mellitus with hyperglycemia (HCC)  Assessment & Plan:  Not at goal  Running 300s-400s more recently  Follows with [facility] endocrine- fax results to Dr. Arceo Brothers today unrevealing, no evidence infection  Most recent HA1C 7.9, next HA1C pending 9/14  Increase Lantus to 20 units daily, NovoLog 8 units with breakfast, NovoLog 10 units with lunch and dinner  Continue CCD diet   Continue activity as tolerated   Monitor for renal and cardiovascular comorbidity- monitor routine labs   Avoid hypo/hyperglycemia      2. Coronary artery disease without angina pectoris, unspecified vessel or lesion type, unspecified whether native or transplanted heart  Assessment & Plan:  Lexiscan stress test:  1. Infarct involving the left circumflex territory/lateral wall with moderate  sonam-infarct ischemia. 2. Normal left ventricle systolic function with ejection fraction of 55%.     Last seen by cardiology on 1/26/2023, note reviewed with the following recommendations:  NSTEMI as noted previously. Currently doing well. He will continue aggressive risk modification. He will continue his current medications. No further cardiac testing is indicated at this time. Follow-up in 1 year     Continue with BP, DM, and HLD control  Annual Lipid panel in April      3. Hyperlipidemia, unspecified hyperlipidemia type  Assessment & Plan:  Stable  Continue Lipitor  Continue BP management  CCD diet   F/u cardiology      4. Urinary frequency  Assessment & Plan:  Likely in setting of BPH  Consult urology       5. History of DVT (deep vein thrombosis)  Assessment & Plan:  Stable  Hx of unprovoked VTE requiring life long AC  Continue Eliquis  Follows with heme/onc - continue annual visit      6.  Other constipation  Assessment & Plan:  Stable  Continue Senna Q HS and PRN 7. CKD (chronic kidney disease) stage 2, GFR 60-89 ml/min  Assessment & Plan:  Stable  Monitor routine labs, CBC, BMP  DM control               CHIEF COMPLAINT:  Monthly Visit    HISTORY OF PRESENT ILLNESS:  History taken from patient, staff, chart     Olivia Villalta is a 26-year-old male LTC resident of [facility] with PMH including but not limited to DM1, HLD, CAD, NSTEMI, CKD, DVT, seen today for monthly follow up and reports of hyperglycemia. Continues with ongoing supportive care at [address] [place] [address]. He has known hx of DM1, follows with endocrinology, recent blood sugars over the last two days running on high end. Patient reports he has been being served "sweet" items on his trays in which he tries to avoid. He does report some continued urinary frequency and occasional difficulty initiating voiding - this also interrupts his sleep. He otherwise reports stable status. [facility] chart reviewed, appetite good eating approximately 75 to 100% of meals, last BM 7/30, weight remains stable. CBC, BMP reviewed today. REVIEW OF SYSTEMS:  Complete ROS negative other than as stated in HPI    HISTORY:  Medical Hx: Reviewed, unchanged  Family Hx: Reviewed, unchanged  Soc Hx: Reviewed, unchanged  No Known Allergies    PHYSICAL EXAM:  Vital Signs: /57, temp 98.9, HR 72, RR 20, O2 100% on room air  Weight 200 pounds    General: NAD, sitting in chair   Eye Exam: anicteric sclera, no discharge  ENT: moist mucous membranes  Cardiovascular: regular rate, regular rhythm, no murmurs  Pulmonary: unlabored, CTA, RA  Abdominal: soft, nontender, nondistended, bowel sounds audible  Neurological: Awake, alert, oriented x4, forgetful.    Skin: No significant lesions appreciated, no significant rashes appreciated  Psych: Pleasant, cooperative, limited insight    PERTINENT LABS/IMAGING DATA:    7/31 CBC, BMP: Hemoglobin 11.7, WBC 3.7, platelet 829, glucose 389, BUN 20, creatinine 0.99, sodium 140, potassium 4.3, EGFR 83    CBC, BMP: Hemoglobin 10.6, WBC 3.3, platelet 009, glucose 305, BUN 16, creatinine 1.0, sodium 139, potassium 4.5, EGFR 82  Hemoglobin A1c: 7.9    4/6/23 BMP: Glucose 120, BUN 18, creatinine 0.94, sodium 142, potassium 3.8, calcium 8.4, EGFR 89  Lipid panel: Cholesterol 109, triglycerides 61, HDL 44, LDL 53  TSH: 1.54  Hemoglobin A1c 8.5    Albumin/creatinine ratio: Urine creatinine 68.4, urine albumin less than 0.7    2/9/2023 lipid panel: Cholesterol 105, triglyceride 85, HDL 45, LDL 43  Vitamin D: 37  CBC: Hemoglobin 10.8, WBC 4.5, platelet 780  TSH: 5.72  Hemoglobin A1c 8.3  CMP: Glucose 287, BUN 20, creatinine 1.01, sodium 138, potassium 4.1, albumin 3.3, total protein 6.0, EGFR 82      CURRENT MEDICATIONS:  All medications reviewed and updated in Nursing Home EMR.     Provider: Kenisha TURCIOS  Patient: Patricia Karl   7/31/2023

## 2023-07-31 NOTE — ASSESSMENT & PLAN NOTE
Not at goal  Running 300s-400s more recently  Follows with [facility]- fax results to Dr. Lisa Rogel today unrevealing, no evidence infection  Most recent HA1C 7.9, next HA1C pending 9/14  Increase Lantus to 20 units daily, NovoLog 8 units with breakfast, NovoLog 10 units with lunch and dinner  Continue CCD diet   Continue activity as tolerated   Monitor for renal and cardiovascular comorbidity- monitor routine labs   Avoid hypo/hyperglycemia

## 2023-07-31 NOTE — TELEPHONE ENCOUNTER
Serena Cheadle called, requesting a call back from on call provider, regarding pt having high glucose level.  Provider paged via [facility]

## 2023-08-31 ENCOUNTER — NURSING HOME VISIT (OUTPATIENT)
Dept: GERIATRICS | Facility: OTHER | Age: 68
End: 2023-08-31
Payer: MEDICARE

## 2023-08-31 DIAGNOSIS — Z86.718 HISTORY OF DVT (DEEP VEIN THROMBOSIS): ICD-10-CM

## 2023-08-31 DIAGNOSIS — E10.65 TYPE 1 DIABETES MELLITUS WITH HYPERGLYCEMIA (HCC): Primary | ICD-10-CM

## 2023-08-31 DIAGNOSIS — E78.5 HYPERLIPIDEMIA, UNSPECIFIED HYPERLIPIDEMIA TYPE: ICD-10-CM

## 2023-08-31 DIAGNOSIS — K59.09 OTHER CONSTIPATION: ICD-10-CM

## 2023-08-31 PROCEDURE — 99308 SBSQ NF CARE LOW MDM 20: CPT | Performed by: INTERNAL MEDICINE

## 2023-08-31 NOTE — PROGRESS NOTES
[address]  [phone]  [facility]  POS 32      NAME: Carmen Coles  AGE: 80 y.o. SEX: male [phone]    DATE OF ENCOUNTER: 8/31/2023    Assessment and Plan     1. Type 1 diabetes mellitus with hyperglycemia (HCC)        2. Hyperlipidemia, unspecified hyperlipidemia type        3. History of DVT (deep vein thrombosis)        4. Other constipation           Type 1 diabetes mellitus with hyperglycemia (HCC)  Pt with labile sugars  Lab Results   Component Value Date    HGBA1C 7.9 (H) 07/06/2023   Cont lantus 17 units SQ daily  Cont novolog 8 units in am  Cont novolog 10 units before lunch & dinner  Cont care per endocrinology    Hyperlipidemia  Stable  Cont atorvastatin 80 mg 1 tab po QHS    History of DVT (deep vein thrombosis)  Stable  Cont eliquis 2.5 mg 1 tab po BID    Other constipation  Stable  Cont prn stool softners       Code status: full    Chief Complaint     Long term follow-up visit. 60 day orders    History of Present Illness   Pt seen and examined as part of 60 day visit. Pt denies any pain. The following portions of the patient's history were reviewed and updated as appropriate: allergies, current medications, past family history, past medical history, past social history, past surgical history and problem list.    Review of Systems     Review of Systems   All other systems reviewed and are negative.         Active Problem List     Patient Active Problem List   Diagnosis    Ambulatory dysfunction    Blurry vision, right eye    Hyperlipidemia    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HCC)    Orthostatic hypotension    Peripheral vascular disease, unspecified (HCC)    Type 1 diabetes mellitus with hyperglycemia (HCC)    Sinus tachycardia    Anxiety    Reactive depression    Coronary artery disease without angina pectoris    Acute kidney injury superimposed on chronic kidney disease     History of DVT (deep vein thrombosis)    Hyperlipidemia due to type 1 diabetes mellitus     Other constipation    CKD (chronic kidney disease) stage 2, GFR 60-89 ml/min    Urinary frequency    Hx of non-ST elevation myocardial infarction (NSTEMI)    Mild cognitive disorder       Objective     Vitals: Reviewed in [employer] system. VSS    General: Awake, alert & oriented x 3  Head: atraumatic, normocephalic  Cardiovascular: RRR  Lungs: Clear to auscultation bilaterally  Abdomen: nontender/nondistended, +BS  Legs: no cyanosis, clubbing or edema  Skin: clean, dry  Psych: calm, cooperative    Pertinent Laboratory/Diagnostic Studies:  Refer to facility chart. Current Medications   Medications reviewed and updated in facility chart.  Total time spent: 15 minutes    Alka Ludwig MD  Internal Medicine  Senior Care Physician

## 2023-10-05 ENCOUNTER — NURSING HOME VISIT (OUTPATIENT)
Dept: GERIATRICS | Facility: OTHER | Age: 68
End: 2023-10-05
Payer: MEDICARE

## 2023-10-05 DIAGNOSIS — E78.5 HYPERLIPIDEMIA, UNSPECIFIED HYPERLIPIDEMIA TYPE: ICD-10-CM

## 2023-10-05 DIAGNOSIS — R26.2 AMBULATORY DYSFUNCTION: ICD-10-CM

## 2023-10-05 DIAGNOSIS — E10.65 TYPE 1 DIABETES MELLITUS WITH HYPERGLYCEMIA (HCC): Primary | ICD-10-CM

## 2023-10-05 DIAGNOSIS — N18.2 CKD (CHRONIC KIDNEY DISEASE) STAGE 2, GFR 60-89 ML/MIN: ICD-10-CM

## 2023-10-05 PROCEDURE — 99309 SBSQ NF CARE MODERATE MDM 30: CPT

## 2023-10-05 NOTE — PROGRESS NOTES
[address]  [phone]    NAME: Ryan Zabala  AGE: 80 y.o.  SEX: male    Progress Note    Location: [facility]  POS: 28 ([facility])  Code Status: Full Code    Chief complaint / Reason for visit:  Follow-up visit    Assessment/Plan:    Type 1 diabetes mellitus with hyperglycemia ([address] )    Lab Results   Component Value Date    HGBA1C 7.9 (H) 07/06/2023     · HgA1c on 09/14/23 was 7.5  Encourage CCD  AC HS blood glucose checks  Blood Glucose Checks:  10/05 B-375 L-511 D-460 HS-170  10/04 B-311 L-403 D-146 HS-229  10/03 B-152 L-287 D-400 HS-157   10/02 B-121 L-290 D-114 HS-140  Avoid hypoglycemia  Hypoglycemia protocol  · Continue novolog 8 units with breakfast 10 units with lunch and dinner  · May need to consider adjusting morning insulin as he is with elevated blood glucoses at lunch and dinner  · Continue lantus 17 units SQ at lunch  · Follow up with Endocrinology on 10/27/23    CKD (chronic kidney disease) stage 2, GFR 60-89 ml/min  Lab Results   Component Value Date    EGFR 60 06/03/2022    EGFR 57 01/27/2022    EGFR 84 05/05/2021    CREATININE 1.24 06/03/2022    CREATININE 1.29 01/27/2022    CREATININE 0.95 05/05/2021     · BMP on 07/31/23  · Creatinine - 0.99  · BUN- 20  · eGFR - 83  · Continue to avoid nephrotoxic medications  · Monitor routine labs    Ambulatory dysfunction  · Encourage use of assistive device  · Uses rolling walker  • Continue fall precautions  • Resident is looking for more independence with walker, would like to be able to go downstairs and/or outside without supervision   • Will speak with nursing staff/therapy  • Encourage patient to participate with activities  • Provide education for patient, family, and caregivers    Hyperlipidemia  · lipid panel (04/06/23)  · cholesterol-109  · triglycerides-61  · LDL-53  · HDL-44  · Being treated with a high-intensity statin d/t prior history of a NSTEMI, PVD, and CAD  · Continue atorvastatin 80 mg po daily  · No adverse effects noted from resident  · Continue to monitor      This is a 80 y.o. male seen today at [facility]. Medical history includes, not limited to, type 1 DM, orthostatic hypotension, peripheral vascular disease, atherosclerotic heart disease, hyperlipidemia, CKD stage 2, hyperlipidemia, and dementia. Resident is seen today for a follow up visit. Upon entering resident's room today he is sitting out of bed in his chair. He is alert and oriented x 3. Able to answer questions appropriately. He currently denies pain. States she has been eating well. States some difficulty with sleeping recently. Is willing to try melatonin as long as it will not interact with his diabetes medications. States he has been ambulating with his rolling walker. Is trying to work with therapy to obtain more independence so he can ambulate downstairs and hopefully outside without assistance. Discussed resident with nursing staff. Reviewed recent labs, vitals and placed new orders. Nursing and prior provider notes reviewed on this visit. Discussed visit with PCP and nursing staff/ supervisor. Review of Systems   Constitutional: Negative for activity change, appetite change, fatigue and fever. HENT: Negative for congestion and rhinorrhea. Eyes: Negative for pain and visual disturbance. Eye glasses   Respiratory: Negative for cough and shortness of breath. Cardiovascular: Negative for chest pain and leg swelling. Gastrointestinal: Negative for abdominal pain and constipation. Genitourinary: Negative for difficulty urinating and dysuria. Musculoskeletal: Positive for gait problem. Negative for arthralgias. Skin: Negative for color change and rash. Neurological: Negative for dizziness, syncope and weakness. Psychiatric/Behavioral: Positive for sleep disturbance. Negative for behavioral problems and confusion.    All other systems reviewed and are negative. ALLERGY: Reviewed, unchanged  No Known Allergies    HISTORY:  Medical Hx: Reviewed, unchanged  Family Hx: Reviewed, unchanged  Soc Hx: Reviewed,  unchanged      Physical Exam  Vitals reviewed. Constitutional:       General: He is not in acute distress. Appearance: Normal appearance. He is not ill-appearing. HENT:      Head: Normocephalic. Right Ear: Tympanic membrane normal.      Left Ear: Tympanic membrane normal.      Nose: Nose normal. No congestion. Mouth/Throat:      Mouth: Mucous membranes are moist.      Pharynx: Oropharynx is clear. Eyes:      General:         Right eye: No discharge. Left eye: No discharge. Extraocular Movements: Extraocular movements intact. Conjunctiva/sclera: Conjunctivae normal.      Pupils: Pupils are equal, round, and reactive to light. Cardiovascular:      Rate and Rhythm: Normal rate and regular rhythm. Pulses: Normal pulses. Heart sounds: Normal heart sounds. Pulmonary:      Effort: Pulmonary effort is normal. No respiratory distress. Breath sounds: Normal breath sounds. Chest:      Chest wall: No tenderness. Abdominal:      General: Bowel sounds are normal.      Palpations: Abdomen is soft. Tenderness: There is no abdominal tenderness. Musculoskeletal:         General: No swelling. Normal range of motion. Cervical back: Normal range of motion. Right lower leg: No edema. Left lower leg: No edema. Skin:     General: Skin is warm and dry. Neurological:      Mental Status: He is alert and oriented to person, place, and time. Mental status is at baseline. Motor: No weakness. Psychiatric:         Mood and Affect: Mood normal.         Behavior: Behavior normal.         Thought Content:  Thought content normal.         Judgment: Judgment normal.            Laboratory results / Imaging reviewed: Hard copy/ies in medical chart:    Vitals:    10/05/23 1114   BP: 108/64   Pulse: 72   Resp: 20   Temp: (!) 97.3 °F (36.3 °C)   SpO2: 97%       Current Medications: All medications reviewed and updated in Nursing Home Chart    Please note: This note was completed in part utilizing a voice-recognition software may have been used in the preparation of this document. Grammatical errors, random word insertion, spelling mistakes, and incomplete sentences may be an occasional consequence of the system secondary to software limitations, ambient noise and hardware issues. Occasional wrong word or "sound-alike" substitutions may have occurred due to the inherent limitations of voice recognition software. At the time of dictation, efforts were made to edit, clarify and/or correct errors. Interpretation should be guided by context. Please read the chart carefully and recognize, using context, where substitutions have occurred. If you have any questions or concerns about the context, text or information contained within the body of this dictation, please contact myself, the provider, for further clarification.       TAM Arrington  10/8/2023

## 2023-10-06 ENCOUNTER — TELEPHONE (OUTPATIENT)
Dept: OTHER | Facility: HOSPITAL | Age: 68
End: 2023-10-06

## 2023-10-06 NOTE — TELEPHONE ENCOUNTER
Patient glucose reading is 561 and 18 units of Novolog has been administered post reading. TC on call provider. ..

## 2023-10-07 NOTE — TELEPHONE ENCOUNTER
[phone]/ULHJ from [address] [place] [address] back to inform On-charan that pt's BS is still High at 426. Dr Rosario Charles was paged via     [phone]/Lea from [address] [place] [address] back to inform On-charan that pt's BS is still High at 426.

## 2023-10-08 VITALS
WEIGHT: 198.8 LBS | RESPIRATION RATE: 20 BRPM | DIASTOLIC BLOOD PRESSURE: 64 MMHG | TEMPERATURE: 97.3 F | OXYGEN SATURATION: 97 % | SYSTOLIC BLOOD PRESSURE: 108 MMHG | BODY MASS INDEX: 28.52 KG/M2 | HEART RATE: 72 BPM

## 2023-10-08 NOTE — ASSESSMENT & PLAN NOTE
· lipid panel (04/06/23)  · cholesterol-109  · triglycerides-61  · LDL-53  · HDL-44  · Being treated with a high-intensity statin d/t prior history of a NSTEMI, PVD, and CAD  · Continue atorvastatin 80 mg po daily  · No adverse effects noted from resident  · Continue to monitor

## 2023-10-08 NOTE — ASSESSMENT & PLAN NOTE
Lab Results   Component Value Date    EGFR 60 06/03/2022    EGFR 57 01/27/2022    EGFR 84 05/05/2021    CREATININE 1.24 06/03/2022    CREATININE 1.29 01/27/2022    CREATININE 0.95 05/05/2021     · BMP on 07/31/23  · Creatinine - 0.99  · BUN- 20  · eGFR - 83  · Continue to avoid nephrotoxic medications  · Monitor routine labs

## 2023-10-08 NOTE — ASSESSMENT & PLAN NOTE
Lab Results   Component Value Date    HGBA1C 7.9 (H) 07/06/2023     · HgA1c on 09/14/23 was 7.5  Encourage CCD  [facility] HS blood glucose checks  Blood Glucose Checks:  10/05 B-375 L-511 D-460 HS-170  10/04 B-311 L-403 D-146 HS-229  10/03 B-152 L-287 D-400 HS-157   10/02 B-121 L-290 D-114 HS-140  Avoid hypoglycemia  Hypoglycemia protocol  · Continue novolog 8 units with breakfast 10 units with lunch and dinner  · May need to consider adjusting morning insulin as he is with elevated blood glucoses at lunch and dinner  · Continue lantus 17 units SQ at lunch  · Follow up with Endocrinology on 10/27/23

## 2023-10-08 NOTE — ASSESSMENT & PLAN NOTE
· Encourage use of assistive device  · Uses rolling walker  • Continue fall precautions  • Resident is looking for more independence with walker, would like to be able to go downstairs and/or outside without supervision   • Will speak with nursing staff/therapy  • Encourage patient to participate with activities  • Provide education for patient, family, and caregivers

## 2023-10-21 ENCOUNTER — TELEPHONE (OUTPATIENT)
Dept: OTHER | Facility: OTHER | Age: 68
End: 2023-10-21

## 2023-10-31 ENCOUNTER — NURSING HOME VISIT (OUTPATIENT)
Dept: GERIATRICS | Facility: OTHER | Age: 68
End: 2023-10-31
Payer: MEDICARE

## 2023-10-31 DIAGNOSIS — F09 MILD COGNITIVE DISORDER: Primary | ICD-10-CM

## 2023-10-31 DIAGNOSIS — E78.5 HYPERLIPIDEMIA DUE TO TYPE 1 DIABETES MELLITUS: ICD-10-CM

## 2023-10-31 DIAGNOSIS — N18.2 CKD (CHRONIC KIDNEY DISEASE) STAGE 2, GFR 60-89 ML/MIN: ICD-10-CM

## 2023-10-31 DIAGNOSIS — E10.69 HYPERLIPIDEMIA DUE TO TYPE 1 DIABETES MELLITUS: ICD-10-CM

## 2023-10-31 DIAGNOSIS — E10.65 TYPE 1 DIABETES MELLITUS WITH HYPERGLYCEMIA (HCC): ICD-10-CM

## 2023-10-31 PROCEDURE — 99309 SBSQ NF CARE MODERATE MDM 30: CPT | Performed by: INTERNAL MEDICINE

## 2023-10-31 NOTE — PROGRESS NOTES
[address]  [phone]   [facility]   POS 32      NAME: Payal Rodriguez  AGE: 80 y.o. SEX: male [phone]    DATE OF ENCOUNTER: 10-    Assessment and Plan     1. Mild cognitive disorder        2. Type 1 diabetes mellitus with hyperglycemia (HCC)        3. Hyperlipidemia due to type 1 diabetes mellitus         4. CKD (chronic kidney disease) stage 2, GFR 60-89 ml/min           Mild cognitive disorder  Speech therapy performed RIPA G2  Pt scored GDS 4/7 which is indicative of mild cognitive disorder  He has difficulty with problem solving and spatial orientation  Redirect, reorient and provide distraction techniques  Avoid deliriogenic agents  Pt's sister is alternate health care representative  Assist with adls/iadls  Cont 64/0 longterm supportive care    Type 1 diabetes mellitus with hyperglycemia ([address])  Sugars labile  Pt regularly sees his endocrinologist  Pt with mild cognitive disorder  Pt gets his lantus every day  7/6/2023 Hga1c 7.9    Pt also on scheduled premeal insulin:  Cont 8 units novolog before breakfast & 10 units before lunch/dinner  Cont lantus 17 units SQ at noon    Cont care per endocrinology      Hyperlipidemia due to type 1 diabetes mellitus ([address])  stable  4/6/2023 ldl 53  Cont atorvastatin 80 mg 1 tab po QHS    CKD (chronic kidney disease) stage 2, GFR 60-89 ml/min  Lab Results   Component Value Date    EGFR 60 06/03/2022    EGFR 57 01/27/2022    EGFR 84 05/05/2021    CREATININE 1.24 06/03/2022    CREATININE 1.29 01/27/2022    CREATININE 0.95 05/05/2021   Stable  7/31/2023 0.99  Avoid nsaids/nephrotoxins/IV contrast  Check periodic bmp       Code status: Full    Chief Complaint     Long term follow-up visit. 60 day orders signed    History of Present Illness     Asked by nurse to see pt as part of 60 day visit. Pt was sleeping. Easily arousable. No fever, chills, cp, nausea or vomiting.     Pt recently saw his endocrinologist. Pt has been receiving his Lantus consistently Pt with hx of     The following portions of the patient's history were reviewed and updated as appropriate: allergies, current medications, past family history, past medical history, past social history, past surgical history and problem list.    Review of Systems     Review of Systems   Constitutional:  Negative for chills and fever. Cardiovascular:  Negative for chest pain. Gastrointestinal:  Negative for nausea and vomiting. Neurological:  Negative for headaches. All other systems reviewed and are negative. Active Problem List     Patient Active Problem List   Diagnosis    Ambulatory dysfunction    Blurry vision, right eye    Hyperlipidemia    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HCC)    Orthostatic hypotension    Peripheral vascular disease, unspecified (HCC)    Type 1 diabetes mellitus with hyperglycemia (HCC)    Sinus tachycardia    Anxiety    Reactive depression    Coronary artery disease without angina pectoris    Acute kidney injury superimposed on chronic kidney disease     History of DVT (deep vein thrombosis)    Hyperlipidemia due to type 1 diabetes mellitus     Other constipation    CKD (chronic kidney disease) stage 2, GFR 60-89 ml/min    Urinary frequency    Hx of non-ST elevation myocardial infarction (NSTEMI)    Mild cognitive disorder       Objective     Vitals: Reviewed in [employer] system. VSS    General: Awake, alert x 3  Head: atraumatic, normocephalic  Cardiovascular: RRR  Lungs: Clear to auscultation bilaterally  Abdomen: nontender/nondistended, +BS  Legs: no cyanosis, clubbing or edema  Skin: clean, dry  Psych: calm, cooperative    Pertinent Laboratory/Diagnostic Studies:  Refer to facility chart.     148.0 mg/dL 10/31/2023 20:11     10/31/2023 17:30 396.0 mg/dL     10/31/2023 17:29 396.0 mg/dL     10/31/2023 17:27 396.0 mg/dL     10/31/2023 13:09 203.0 mg/dL     10/31/2023 13:07 203.0 mg/dL     10/31/2023 13:06 203.0 mg/dL 10/31/2023 07:14 166.0 mg/dL     10/31/2023 07:14 166.0 mg/dL     10/30/2023 20:43 208.0 mg/dL     10/30/2023 20:43 208.0 mg/dL     10/30/2023 18:15 119.0 mg/dL     10/30/2023 18:14 119.0 mg/dL     10/30/2023 18:13 119.0 mg/dL     10/30/2023 11:36 342.0 mg/dL     10/30/2023 11:36 342.0 mg/dL     10/30/2023 11:35 342.0 mg/dL     10/30/2023 09:02 342.0 mg/dL     10/30/2023 09:01 342.0 mg/dL     10/29/2023 20:37 188.0 mg/dL     10/29/2023 20:36 188.0 mg/dL     10/29/2023 16:21 74.0 mg/dL     10/29/2023 16:21 74.0 mg/dL     10/29/2023 16:21 74.0 mg/dL     10/29/2023 13:01 142.0 mg/dL     10/29/2023 12:34 142.0 mg/dL     10/29/2023 12:33 142.0 mg/dL     10/29/2023 08:27 90.0 mg/dL     10/29/2023 08:04 90.0 mg/dL     10/28/2023 20:29 221.0 mg/dL     10/28/2023 20:27 221.0 mg/dL     10/28/2023 17:52 243.0 mg/dL     10/28/2023 17:52 243.0 mg/dL     10/28/2023 16:05 243.0 mg/dL     10/28/2023 13:05 167.0 mg/dL     10/28/2023 13:05 167.0 mg/dL     10/28/2023 11:45 167.0 mg/dL     10/28/2023 07:38 125.0 mg/dL     10/28/2023 07:38 125.0 mg/dL         Current Medications   Medications reviewed and updated in facility chart.     Acetaminophen Tablet 325 MG    Acetaminophen Suppository 650 MG Insert 1 suppository rectally every 4 hours as needed for Mild Pain Max 3 GM APAP / 24 HR, Give Suppository If Unable to Take By Mouth AND Insert 1 suppository rectally every 4 hours as needed for Temperature = or > 100 degrees Oral / 101 degrees Rectal Max 3 GM APAP / 24 HR, Give Suppository If Unable to Take By Mouth   ELIQUIS 2.5MG TABLET GIVE 1 TABLET BY MOUTH TWICE DAILY FOR DEEP VEIN THROMBOSIS(Related Diagnoses: PERSONAL HISTORY OF OTHER VENOUS THROMBOSIS AND EMBOLISM (Z86.718))(Indications for Use: DVT)   ASPIRIN 81MG LOW DOSE CHEW TAB Give 1 tablet orally one time a day for CAD(Related Diagnoses: ATHEROSCLEROTIC HEART DISEASE OF NATIVE CORONARY ARTERY WITHOUT ANGINA PECTORIS (I25.10))   ATORVASTATIN 80MG TABLET GIVE 1 TABLET BY MOUTH ONCE DAILY WITH DINNER AT 1700 FOR HYPERLIPIDEMIA(Related Diagnoses: HYPERLIPIDEMIA, UNSPECIFIED (E78.5))   CALCIUM+D3 600MG-200U TABLET GIVE 1 TABLET BY MOUTH TWICE DAILY FOR VITAMIN DEFICIENCY(Related Diagnoses: VITAMIN D DEFICIENCY, UNSPECIFIED (E55.9))(Indications for Use: vit. def.)   DOCUSATE 100MG CAPSULE Give 1 capsule orally two times a day for constipation(Related Diagnoses: CONSTIPATION, UNSPECIFIED (K59.00))   DORZOLAMIDE/TIMOLOL OPTH SOLN INSTILL ONE DROP INTO RIGHT EYE TWICE DAILY FOR GLAUCOMA(Related Diagnoses: TYPE 1 DIABETES MELLITUS WITH PROLIFERATIVE DIABETIC RETINOPATHY WITH MACULAR EDEMA, BILATERAL (E10.9243))   LATANOPROST 0.005% OPTH SOLN INSTILL ONE DROP INTO RIGHT EYE ONCE DAILY AT BEDTIME FOR GLAUCOMA *REFRIGERATE UNTIL OPEN, THEN 6 WEEKS AT ROOM TEMPERATURE*(Related Diagnoses: TYPE 1 DIABETES MELLITUS WITH PROLIFERATIVE DIABETIC RETINOPATHY WITH MACULAR EDEMA, BILATERAL (M71.2018))   Enema Disposable Enema (Sodium Phosphates) Insert 1 application rectally as needed for Constipation 1 Time a Day, If NO BM within 3-5 Hours of Suppository Insertion Give Enema   Bisac-Evac Suppository 10 MG (Bisacodyl) Insert 1 suppository rectally as needed for Constipation 1 Time a Day, If NO BM by AM Day # 4, Day Nurse Gives a Suppository that AM   Sorbitol Solution 70 % Give 30 ml orally as needed for Bowel Protocol for ROSANA/CKD 1 Time a Day, If No BM on by Day # 2, Evening Nurse Gives a Laxative on Day # 2 AND Give 30 ml orally as needed for Bowel Protocol for ROSANA/CKD 1 Time a Day, If No BM on by Day # 3, Evening Nurse Gives a Laxative on Day # 3   MiraLax Powder (Polyethylene Glycol 3350) Give 17 gram orally every 24 hours as needed for constipation mix w/ 4oz fluid of choice   NOVOLOG FLEXPEN 100UNITS/ML INJECT SQ BEFORE MEALS AND AT BEDTIME PER SLIDING SCALE: 156-195= 1 UNIT, , 196-235= 2 UNITS, 236-275=3 UNITS, 276-315 = 4 UNITS, 316-355= 5 UNITS, 356-390=6 UNITS, 391-410=7 UNITS, >410=8 UNITS , <70 OR >450 =CALL MD AND FOLLOW HYPOGLYCEMIC PROTOCOL *REFRIGERATE UNTIL OPEN, THEN 28 DAYS AT ROOM TEMPERATURE*(Related Diagnoses: TYPE 1 DIABETES MELLITUS WITH KETOACIDOSIS WITHOUT COMA (E10.10))(Additional Directions: INJECT SQ BEFORE MEALS+ AT BEDTIME PER SLIDING SCALE: 156-195= 1 UNIT, 196-235= 2 UNITS, 236-275=3 UNITS, 276-315 = 4 UNITS, 316-355= 5 UNITS, 356-390=6 UNITS, 391-410=7 UNITS,>410=8 UNITS , <70 OR >450 =CALL MD+FOLLOW HYPOGLYCEMIC PROTOCOL)   NOVOLOG FLEXPEN 100UNITS/ML INJECT 8 UNITS SQ EVERY MORNING AT 0800 *HOLD FOR BLOOD SUGAR <70MG/DL OR IF RESIDENT DIDN'T EAT* *REFRIGERATE UNTIL OPEN, THEN 28 DAYS AT ROOM TEMPERATURE*(Related Diagnoses: TYPE 1 DIABETES MELLITUS WITH PROLIFERATIVE DIABETIC RETINOPATHY WITH MACULAR EDEMA, BILATERAL (E10.3513))(Additional Directions: INJECT 8 UNITS SQ EVERY MORNING AT 0800 *HOLD FOR BLOOD SUGAR <70MG/DL OR IF RESIDENT DIDN'T EAT* *REFRIGERATE UNTIL OPEN, THEN 28 DAYS AT ROOM TEMPERATURE*) AND INJECT 10 UNITS SQ TWICE DAILY BEFORE LUNCH AT 1200 AND BEFORE DINNER AT 1700 *HOLD FOR BLOOD SUGAR <70MG/DL OR IF RESIDENT DIDNT EAT *REFRIGERATE UNTIL OPEN, THEN 28 DAYS AT ROOM TEMPERATURE*(Related Diagnoses: TYPE 1 DIABETES MELLITUS WITH PROLIFERATIVE DIABETIC RETINOPATHY WITH MACULAR EDEMA, BILATERAL (E10.3513))   SENNA 8.6MG TABLET Give 1 tablet orally at bedtime for constipation related to CONSTIPATION, UNSPECIFIED (K59.00)   Lantus SoloStar Subcutaneous Solution Pen-injector 100 UNIT/ML (Insulin Glargine) Inject 17 unit subcutaneously in the afternoon related to TYPE 1 DIABETES MELLITUS WITH PROLIFERATIVE DIABETIC RETINOPATHY WITH MACULAR EDEMA, BILATERAL (S50.9252)   Melatonin Tablet 3 MG Give 1 tablet by mouth at bedtime for insomina       Mckenzie Martínez MD  Internal Medicine  Senior Care Physician

## 2023-11-05 PROBLEM — F09 MILD COGNITIVE DISORDER: Status: ACTIVE | Noted: 2023-02-01

## 2023-11-05 NOTE — ASSESSMENT & PLAN NOTE
Lab Results   Component Value Date    EGFR 60 06/03/2022    EGFR 57 01/27/2022    EGFR 84 05/05/2021    CREATININE 1.24 06/03/2022    CREATININE 1.29 01/27/2022    CREATININE 0.95 05/05/2021   Stable  7/31/2023 0.99  Avoid nsaids/nephrotoxins/IV contrast  Check periodic bmp

## 2023-11-05 NOTE — ASSESSMENT & PLAN NOTE
Speech therapy performed RIPA G2  Pt scored GDS 4/7 which is indicative of mild cognitive disorder  He has difficulty with problem solving and spatial orientation  Redirect, reorient and provide distraction techniques  Avoid deliriogenic agents  Pt's sister is alternate health care representative  Assist with adls/iadls  Cont 83/6 longterm supportive care

## 2023-11-05 NOTE — ASSESSMENT & PLAN NOTE
Pt with labile sugars  Lab Results   Component Value Date    HGBA1C 7.9 (H) 07/06/2023   Cont lantus 17 units SQ daily  Cont novolog 8 units in am  Cont novolog 10 units before lunch & dinner  Cont care per endocrinology

## 2023-11-05 NOTE — ASSESSMENT & PLAN NOTE
Sugars labile  Pt regularly sees his endocrinologist  Pt with mild cognitive disorder  Pt gets his lantus every day  7/6/2023 Hga1c 7.9    Pt also on scheduled premeal insulin:  Cont 8 units novolog before breakfast & 10 units before lunch/dinner  Cont lantus 17 units SQ at noon    Cont care per endocrinology

## 2023-11-30 ENCOUNTER — NURSING HOME VISIT (OUTPATIENT)
Dept: GERIATRICS | Facility: OTHER | Age: 68
End: 2023-11-30
Payer: MEDICARE

## 2023-11-30 VITALS
WEIGHT: 202.1 LBS | BODY MASS INDEX: 29 KG/M2 | SYSTOLIC BLOOD PRESSURE: 153 MMHG | DIASTOLIC BLOOD PRESSURE: 82 MMHG | HEART RATE: 74 BPM

## 2023-11-30 DIAGNOSIS — E10.65 TYPE 1 DIABETES MELLITUS WITH HYPERGLYCEMIA (HCC): Primary | ICD-10-CM

## 2023-11-30 DIAGNOSIS — H53.8 BLURRY VISION, RIGHT EYE: ICD-10-CM

## 2023-11-30 DIAGNOSIS — F09 MILD COGNITIVE DISORDER: ICD-10-CM

## 2023-11-30 DIAGNOSIS — F41.9 ANXIETY: ICD-10-CM

## 2023-11-30 DIAGNOSIS — R26.2 AMBULATORY DYSFUNCTION: ICD-10-CM

## 2023-11-30 DIAGNOSIS — Z86.718 HISTORY OF DVT (DEEP VEIN THROMBOSIS): ICD-10-CM

## 2023-11-30 PROCEDURE — 99309 SBSQ NF CARE MODERATE MDM 30: CPT

## 2023-11-30 RX ORDER — LANOLIN ALCOHOL/MO/W.PET/CERES
3 CREAM (GRAM) TOPICAL
COMMUNITY

## 2023-11-30 RX ORDER — LATANOPROST 50 UG/ML
1 SOLUTION/ DROPS OPHTHALMIC
COMMUNITY

## 2023-11-30 RX ORDER — DOCUSATE SODIUM 100 MG/1
100 CAPSULE, LIQUID FILLED ORAL 2 TIMES DAILY
COMMUNITY

## 2023-11-30 NOTE — ASSESSMENT & PLAN NOTE
Encourage use of assistive device  Uses rolling walker  Continue fall precautions  Encourage patient to participate with activities  Provide education for patient, family, and caregivers

## 2023-11-30 NOTE — ASSESSMENT & PLAN NOTE
May require redirection and reorientation  Encourage participation with group activities  Encourage staying mentally active with word searches, crossword puzzles, and suduko   Avoid deliriogenic medications   Continue 24/7 supportive care at LTC

## 2023-11-30 NOTE — ASSESSMENT & PLAN NOTE
Mood stable at time of assessment  Continue to monitor for changes  Continue to provide 24/7 supportive care at [address] [place] [address]

## 2023-11-30 NOTE — ASSESSMENT & PLAN NOTE
HgA1c on 09/14/23 was 7.5  Encourage CCD  AC HS blood glucose checks  Blood glucose checks reviewed from facility records  Stable   Avoid hypoglycemia  Hypoglycemia protocol  Continue novolog 8 units with breakfast 10 units with lunch and dinner  Continue lantus 17 units SQ at lunch  Repeat HgA1c in January   Follow up with Endocrinology on 03/05/24

## 2023-11-30 NOTE — ASSESSMENT & PLAN NOTE
Continue latanoprost ophthalmic solution drop to right eye daily at bedtime  Follow up with ophthalmology  [facility] on 12/12/23

## 2024-01-12 ENCOUNTER — NURSING HOME VISIT (OUTPATIENT)
Dept: GERIATRICS | Facility: OTHER | Age: 69
End: 2024-01-12
Payer: MEDICARE

## 2024-01-12 VITALS
HEART RATE: 77 BPM | TEMPERATURE: 97.7 F | OXYGEN SATURATION: 98 % | RESPIRATION RATE: 18 BRPM | DIASTOLIC BLOOD PRESSURE: 64 MMHG | SYSTOLIC BLOOD PRESSURE: 108 MMHG

## 2024-01-12 DIAGNOSIS — R26.2 AMBULATORY DYSFUNCTION: Primary | ICD-10-CM

## 2024-01-12 DIAGNOSIS — I95.1 ORTHOSTATIC HYPOTENSION: ICD-10-CM

## 2024-01-12 DIAGNOSIS — E10.65 TYPE 1 DIABETES MELLITUS WITH HYPERGLYCEMIA (HCC): ICD-10-CM

## 2024-01-12 DIAGNOSIS — H53.8 BLURRY VISION, RIGHT EYE: ICD-10-CM

## 2024-01-12 DIAGNOSIS — F41.9 ANXIETY: ICD-10-CM

## 2024-01-12 DIAGNOSIS — Z86.718 HISTORY OF DVT (DEEP VEIN THROMBOSIS): ICD-10-CM

## 2024-01-12 PROCEDURE — 99308 SBSQ NF CARE LOW MDM 20: CPT | Performed by: INTERNAL MEDICINE

## 2024-01-12 NOTE — ASSESSMENT & PLAN NOTE
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

## 2024-01-12 NOTE — ASSESSMENT & PLAN NOTE
Patient with markedly decreased vision in his right eye secondary to his underlying diabetes.  He does take latanoprost ophthalmic solution to the right eye at bedtime and does follow with ophthalmology for site at [facility].

## 2024-01-12 NOTE — ASSESSMENT & PLAN NOTE
Patient does have a rolling walker that he can use he was encouraged to ambulate.  Make sure that he is well monitored.

## 2024-01-12 NOTE — PROGRESS NOTES
[facility]  Bon Cifuentes [facility]  Progress Note [facility] POS32 Time spent 45 minutes    NAME: Tuan Remy  AGE: 68 y.o. SEX: male [phone]    DATE OF ENCOUNTER: 1/12/2024    Assessment and Plan     Problem List Items Addressed This Visit          Endocrine    Type 1 diabetes mellitus with hyperglycemia (HCC)       Cardiovascular and Mediastinum    Orthostatic hypotension     Patient with history of orthostatic hypotension.            Other    Ambulatory dysfunction - Primary     Patient does have a rolling walker that he can use he was encouraged to ambulate.  Make sure that he is well monitored.         Blurry vision, right eye     Patient with markedly decreased vision in his right eye secondary to his underlying diabetes.  He does take latanoprost ophthalmic solution to the right eye at bedtime and does follow with ophthalmology for site at [facility].         Anxiety    History of DVT (deep vein thrombosis)     Currently on Eliquis 2.5 mg orally twice a day.            All medications and routine orders were reviewed and updated as needed.    Plan discussed with: Patient    Chief Complaint     No chief complaint on file.  Patient with no active complaints today.    History of Present Illness     HPI patient is a 68-year-old  male who resides and D2 at [facility].  Patient has had a previous history of diabetes mellitus type 1, history of orthostatic hypotension, atherosclerotic heart disease, peripheral vascular disease, hyperlipidemia, chronic renal insufficiency stage II, and mild cognitive impairment.  When evaluated today he was sitting in his chair he appeared to be very comfortable.  He does work on crossword puzzles and tells me he does read he has some difficulty breathing out of his right eye.  She denies any chest pain denies any shortness of breath.      HISTORY:  History reviewed. No pertinent surgical history.   Past  Medical History:   Diagnosis Date    Acute kidney injury (HCC)     ROSANA (acute kidney injury) (HCC) 12/28/2015    Dehydration     Diabetes mellitus (HCC)     Type 1    Hyperlipidemia associated with type 2 diabetes mellitus  1/17/2020    Last Assessment & Plan:  Per Dr. Blanco. Ok for patient to hold simvastatin due to GI upset    Hyponatremia 10/18/2022    Lymphoma (HCC)     Urinary retention 8/29/2018    Vomiting 1/18/2023     History reviewed. No pertinent family history.  Social History     Socioeconomic History    Marital status: Single     Spouse name: None    Number of children: None    Years of education: None    Highest education level: None   Occupational History    None   Tobacco Use    Smoking status: Never    Smokeless tobacco: Never   Vaping Use    Vaping status: Never Used   Substance and Sexual Activity    Alcohol use: Not Currently    Drug use: Never    Sexual activity: None   Other Topics Concern    None   Social History Narrative    None     Social Determinants of Health     Financial Resource Strain: Not on file   Food Insecurity: Not on file   Transportation Needs: Not on file   Physical Activity: Not on file   Stress: Not on file   Social Connections: Not on file   Intimate Partner Violence: Not on file   Housing Stability: Not on file       Allergies:  No Known Allergies    Review of Systems     Review of Systems   Constitutional: Negative.    HENT: Negative.     Eyes:  Positive for visual disturbance.        Visual disturbance in right eye   Respiratory: Negative.     Cardiovascular: Negative.    Gastrointestinal: Negative.    Endocrine:        Diabetes mellitus type 1   Genitourinary: Negative.    Musculoskeletal:  Positive for arthralgias and gait problem.   Skin: Negative.    Psychiatric/Behavioral: Negative.         PHQ-2/9 Depression Screening             Medications and orders       Current Outpatient Medications:     apixaban (ELIQUIS) 2.5 mg, Take 1 tablet by mouth 2 (two) times a day,  Disp: , Rfl:     aspirin 81 mg chewable tablet, Chew 81 mg daily, Disp: , Rfl:     atorvastatin (LIPITOR) 80 mg tablet, Take 80 mg by mouth every evening, Disp: , Rfl:     Calcium Carbonate-Vitamin D 600-200 MG-UNIT CAPS, Take 1 tablet by mouth 2 (two) times a day, Disp: , Rfl:     Dextrose, Diabetic Use, 15 GM/33GM GEL, Take 1 packet by mouth, Disp: , Rfl:     docusate sodium (COLACE) 100 mg capsule, Take 100 mg by mouth 2 (two) times a day, Disp: , Rfl:     dorzolamide-timolol (COSOPT) 22.3-6.8 MG/ML ophthalmic solution, Administer 1 drop to the right eye 2 (two) times a day, Disp: , Rfl:     insulin aspart (NovoLOG FlexPen) 100 UNIT/ML injection pen, Inject 8 Units under the skin in the morning, Disp: , Rfl:     insulin aspart (NovoLOG FlexPen) 100 UNIT/ML injection pen, Inject 10 Units under the skin 2 (two) times a day AT LUNCH AND DINNER, Disp: , Rfl:     insulin glargine (LANTUS) 100 units/mL subcutaneous injection, Inject 17 Units under the skin in the morning, Disp: , Rfl:     insulin lispro (HumaLOG) 100 units/mL injection, Inject 1-6 Units under the skin 3 (three) times a day before meals Blood Glucose 150 - 189: 1 Unit of Insulin Blood Glucose 190 - 229: 2 Units of Insulin Blood Glucose 230 - 269: 3 Units of Insulin Blood Glucose 270 - 309: 4 Units of Insulin Blood Glucose 310 - 349: 5 Units of Insulin Blood Glucose greater than or equal to 350: 6 Units of Insulin, Disp: 10 mL, Rfl: 0    latanoprost (XALATAN) 0.005 % ophthalmic solution, Administer 1 drop to the right eye daily at bedtime, Disp: , Rfl:     melatonin 3 mg, Take 3 mg by mouth daily at bedtime, Disp: , Rfl:     polyethylene glycol (MIRALAX) 17 g packet, Take 17 g by mouth daily, Disp: 30 each, Rfl: 0    Pramoxine-Calamine (AVEENO ANTI-ITCH EX), Apply topically, Disp: , Rfl:     senna (SENOKOT) 8.6 MG tablet, Take 1 tablet by mouth daily at bedtime, Disp: , Rfl:        Objective     Vitals:   Vitals:    01/12/24 1625   BP: 108/64   Pulse: 77    Resp: 18   Temp: 97.7 °F (36.5 °C)   SpO2: 98%       Physical Exam  HENT:      Head: Normocephalic and atraumatic.      Right Ear: Ear canal and external ear normal.      Left Ear: Ear canal and external ear normal.      Nose: Nose normal.      Mouth/Throat:      Mouth: Mucous membranes are dry.   Eyes:      Extraocular Movements: Extraocular movements intact.      Conjunctiva/sclera: Conjunctivae normal.   Cardiovascular:      Rate and Rhythm: Normal rate and regular rhythm.      Pulses: Normal pulses.      Heart sounds: Normal heart sounds.   Abdominal:      Palpations: Abdomen is soft.   Musculoskeletal:      Cervical back: Neck supple.   Neurological:      Mental Status: He is alert and oriented to person, place, and time. Mental status is at baseline.   Psychiatric:         Mood and Affect: Mood normal.         Behavior: Behavior normal.         Thought Content: Thought content normal.         Pertinent Laboratory/Diagnostic Studies:   The following labs/studies were reviewed please see facility chart for details.

## 2024-01-12 NOTE — ASSESSMENT & PLAN NOTE
Lab Results   Component Value Date    HGBA1C 7.9 (H) 07/06/2023   Patient currently on NovoLog 8 units with breakfast 10 units with lunch and dinner and Lantus insulin 17 units daily.  Continue monitoring blood sugars regularly.

## 2024-02-12 ENCOUNTER — NURSING HOME VISIT (OUTPATIENT)
Dept: GERIATRICS | Facility: OTHER | Age: 69
End: 2024-02-12
Payer: MEDICARE

## 2024-02-12 DIAGNOSIS — E10.65 TYPE 1 DIABETES MELLITUS WITH HYPERGLYCEMIA (HCC): Primary | ICD-10-CM

## 2024-02-12 DIAGNOSIS — F41.9 ANXIETY: ICD-10-CM

## 2024-02-12 DIAGNOSIS — E78.5 HYPERLIPIDEMIA DUE TO TYPE 1 DIABETES MELLITUS: ICD-10-CM

## 2024-02-12 DIAGNOSIS — E78.5 HYPERLIPIDEMIA, UNSPECIFIED HYPERLIPIDEMIA TYPE: ICD-10-CM

## 2024-02-12 DIAGNOSIS — Z86.718 HISTORY OF DVT (DEEP VEIN THROMBOSIS): ICD-10-CM

## 2024-02-12 DIAGNOSIS — R26.2 AMBULATORY DYSFUNCTION: ICD-10-CM

## 2024-02-12 DIAGNOSIS — F09 MILD COGNITIVE DISORDER: ICD-10-CM

## 2024-02-12 DIAGNOSIS — F51.01 PRIMARY INSOMNIA: ICD-10-CM

## 2024-02-12 DIAGNOSIS — E10.69 HYPERLIPIDEMIA DUE TO TYPE 1 DIABETES MELLITUS: ICD-10-CM

## 2024-02-12 PROCEDURE — 99309 SBSQ NF CARE MODERATE MDM 30: CPT

## 2024-02-13 VITALS
TEMPERATURE: 97.7 F | OXYGEN SATURATION: 97 % | HEART RATE: 67 BPM | WEIGHT: 204 LBS | RESPIRATION RATE: 18 BRPM | SYSTOLIC BLOOD PRESSURE: 115 MMHG | BODY MASS INDEX: 29.27 KG/M2 | DIASTOLIC BLOOD PRESSURE: 65 MMHG

## 2024-02-13 PROBLEM — F51.01 PRIMARY INSOMNIA: Status: ACTIVE | Noted: 2024-02-13

## 2024-02-13 RX ORDER — TAMSULOSIN HYDROCHLORIDE 0.4 MG/1
0.4 CAPSULE ORAL
COMMUNITY

## 2024-02-14 NOTE — ASSESSMENT & PLAN NOTE
lipid panel (04/06/23)  cholesterol-109  triglycerides-61  LDL-53  HDL-44  Being treated with a high-intensity statin d/t prior history of a NSTEMI, PVD, and CAD  Continue atorvastatin 80 mg po daily  No adverse effects noted from resident  Continue to monitor yearly

## 2024-02-14 NOTE — ASSESSMENT & PLAN NOTE
HgA1c on 09/14/23 was 7.5  Encourage CCD  AC HS blood glucose checks  Blood glucose checks reviewed from facility records  Stable   Avoid hypoglycemia  Hypoglycemia protocol  Continue novolog 8 units with breakfast 10 units with lunch and dinner  Continue lantus 20 units SQ at lunch  Repeat HgA1c Q3 months   Follow up with Endocrinology on 03/05/24

## 2024-02-14 NOTE — ASSESSMENT & PLAN NOTE
Mood stable at time of assessment  Continue to monitor for changes  Continue to provide 24/7 supportive care at LTC    Patient appears to have subsequent cellulitis along with numerous wounds  R hand wound between the 2nd and 3rd digits with drainage, redness extending up the forearm, warm to touch  Afebrile, WBC normalized   Blood cultures no growth   Completed course of Vancomycin Patient appears to have subsequent cellulitis along with numerous wounds  R hand wound between the 2nd and 3rd digits with drainage, redness extending up the forearm, warm to touch  Afebrile, WBC normalized   Blood cultures no growth   Completed course of Vancomycin Patient appears to have subsequent cellulitis along with numerous wounds  R hand wound between the 2nd and 3rd digits with drainage, redness extending up the forearm, warm to touch  Afebrile, WBC normalized   Blood cultures sent   c/w Vancomycin Patient appears to have subsequent cellulitis along with numerous wounds  R hand wound between the 2nd and 3rd digits with drainage, redness extending up the forearm, warm to touch  Afebrile, WBC normalized   Blood cultures no growth   c/w Vancomycin, will switch to doxycycline on discharge Patient appears to have subsequent cellulitis along with numerous wounds  R hand wound between the 2nd and 3rd digits with drainage, redness extending up the forearm, warm to touch  Afebrile, WBC normalized   Blood cultures no growth   Completed course of Vancomycin Patient appears to have subsequent cellulitis along with numerous wounds  R hand wound between the 2nd and 3rd digits with drainage, redness extending up the forearm, warm to touch  Afebrile, WBC normalized   Blood cultures sent   c/w Vancomycin

## 2024-02-14 NOTE — ASSESSMENT & PLAN NOTE
Encourage use of assistive device  Uses rolling walker  Continue fall precautions  Has been cleared by therapy to move around the building  Encourage patient to participate with activities  Provide education for patient, family, and caregivers

## 2024-02-14 NOTE — ASSESSMENT & PLAN NOTE
First line is behavioral therapy  Avoid sedative hypnotics such as benzodiazepines and benadryl  Encourage staying awake during the day  Encourage daytime activity, morning exercise  Decrease or eliminate day time naps  Avoid caffiene, alcohol especially during late afternoon and evening hours  Establish a night time routine  Continue melatonin 3 mg po daily at bedtime

## 2024-02-14 NOTE — ASSESSMENT & PLAN NOTE
HgA1c (09/14/23)  7.5  Improvement from July  Continue to monitor Q3 months  Continue current diabetic regimen

## 2024-02-14 NOTE — PROGRESS NOTES
[facility]  [address] [place] [address], [address], [place] [address]  [phone]    NAME: Tuan Remy  AGE: 68 y.o. SEX: male    Progress Note    Location: [facility]  POS: 32 ([facility])  Code Status: Full Code    Chief complaint / Reason for visit:  Follow-up visit    Assessment/Plan:    Ambulatory dysfunction  Encourage use of assistive device  Uses rolling walker  Continue fall precautions  Has been cleared by therapy to move around the building  Encourage patient to participate with activities  Provide education for patient, family, and caregivers    Anxiety  Mood stable at time of assessment  Continue to monitor for changes  Continue to provide 24/7 supportive care at [facility]     History of DVT (deep vein thrombosis)  Continue eliquis 2.5 mg po BID    Mild cognitive disorder  May require redirection and reorientation  Encourage participation with group activities  Encourage staying mentally active with word searches, crossword puzzles, and suduko   Avoid deliriogenic medications   Continue 24/7 supportive care at [facility]    Hyperlipidemia due to type 1 diabetes mellitus (HCC)  HgA1c (09/14/23)  7.5  Improvement from July  Continue to monitor Q3 months  Continue current diabetic regimen     Type 1 diabetes mellitus with hyperglycemia (HCC)  HgA1c on 09/14/23 was 7.5  Encourage CCD  AC HS blood glucose checks  Blood glucose checks reviewed from facility records  Stable   Avoid hypoglycemia  Hypoglycemia protocol  Continue novolog 8 units with breakfast 10 units with lunch and dinner  Continue lantus 20 units SQ at lunch  Repeat HgA1c Q3 months   Follow up with Endocrinology on 03/05/24    Hyperlipidemia  lipid panel (04/06/23)  cholesterol-109  triglycerides-61  LDL-53  HDL-44  Being treated with a high-intensity statin d/t prior history of a NSTEMI, PVD, and CAD  Continue atorvastatin 80 mg po daily  No adverse effects noted from resident  Continue to monitor yearly     Primary insomnia  First line is  behavioral therapy  Avoid sedative hypnotics such as benzodiazepines and benadryl  Encourage staying awake during the day  Encourage daytime activity, morning exercise  Decrease or eliminate day time naps  Avoid caffiene, alcohol especially during late afternoon and evening hours  Establish a night time routine  Continue melatonin 3 mg po daily at bedtime        This is a 68 y.o. male seen today at [facility]. Medical history includes, not limited to, type 1 DM, orthostatic hypotension, peripheral vascular disease, atherosclerotic heart disease, hyperlipidemia, CKD stage 2, hyperlipidemia, and dementia.    Resident is seen today for a routine follow up visit. Upon entering resident's room today he is sitting out of bed in his chair.  He is alert and oriented x 3.  Able to answer questions appropriately.  He currently denies pain.  States he has been ambulating with his rolling walker, ambulating more throughout the building, he enjoys walking and sitting in the breezeway.  Encouraged him to be out of his room more often, continuing to complete his puzzle books.  He states sometimes he has trouble sleeping at night d/t his room situation.      Reviewed resident with nursing staff.         Nursing and prior provider notes reviewed on this visit. Discussed visit with PCP and nursing staff/ supervisor.      Review of Systems   Constitutional:  Negative for activity change, appetite change, fatigue and fever.   HENT:  Negative for congestion and rhinorrhea.    Eyes:  Negative for pain and visual disturbance.   Respiratory:  Negative for cough and shortness of breath.    Cardiovascular:  Negative for chest pain and leg swelling.   Gastrointestinal:  Negative for abdominal pain and constipation.   Genitourinary:  Negative for difficulty urinating and dysuria.   Musculoskeletal:  Negative for arthralgias.   Skin:  Negative for color change and rash.   Neurological:  Negative for dizziness, syncope and weakness.    Psychiatric/Behavioral:  Positive for sleep disturbance. Negative for behavioral problems and confusion.    All other systems reviewed and are negative.         ALLERGY: Reviewed, unchanged  No Known Allergies    HISTORY:  Medical Hx: Reviewed, unchanged  Family Hx: Reviewed, unchanged  Soc Hx: Reviewed,  unchanged      Physical Exam  Vitals reviewed.   Constitutional:       General: He is not in acute distress.     Appearance: Normal appearance. He is not ill-appearing.   HENT:      Head: Normocephalic.      Right Ear: Tympanic membrane normal.      Left Ear: Tympanic membrane normal.      Nose: Nose normal. No congestion.      Mouth/Throat:      Mouth: Mucous membranes are moist.      Pharynx: Oropharynx is clear.   Eyes:      General:         Right eye: No discharge.         Left eye: No discharge.      Extraocular Movements: Extraocular movements intact.      Conjunctiva/sclera: Conjunctivae normal.      Pupils: Pupils are equal, round, and reactive to light.   Cardiovascular:      Rate and Rhythm: Normal rate and regular rhythm.      Pulses: Normal pulses.      Heart sounds: Normal heart sounds.   Pulmonary:      Effort: Pulmonary effort is normal. No respiratory distress.      Breath sounds: Normal breath sounds.   Chest:      Chest wall: No tenderness.   Abdominal:      General: Bowel sounds are normal.      Palpations: Abdomen is soft.      Tenderness: There is no abdominal tenderness.   Musculoskeletal:         General: No swelling. Normal range of motion.      Cervical back: Normal range of motion.      Right lower leg: No edema.      Left lower leg: No edema.   Skin:     General: Skin is warm and dry.   Neurological:      Mental Status: He is alert and oriented to person, place, and time. Mental status is at baseline.      Motor: No weakness.   Psychiatric:         Mood and Affect: Mood normal.         Behavior: Behavior normal.         Thought Content: Thought content normal.         Judgment: Judgment  "normal.            Laboratory results / Imaging reviewed: Hard copy/ies in medical chart:    Vitals:    02/13/24 2311   BP: 115/65   Pulse: 67   Resp: 18   Temp: 97.7 °F (36.5 °C)   SpO2: 97%       Current Medications:  All medications reviewed and updated in Nursing Home Chart    Please note: This note was completed in part utilizing a voice-recognition software may have been used in the preparation of this document. Grammatical errors, random word insertion, spelling mistakes, and incomplete sentences may be an occasional consequence of the system secondary to software limitations, ambient noise and hardware issues. Occasional wrong word or \"sound-alike\" substitutions may have occurred due to the inherent limitations of voice recognition software. At the time of dictation, efforts were made to edit, clarify and/or correct errors. Interpretation should be guided by context. Please read the chart carefully and recognize, using context, where substitutions have occurred. If you have any questions or concerns about the context, text or information contained within the body of this dictation, please contact myself, the provider, for further clarification.      TAM Joseph  2/13/2024  "

## 2024-03-13 ENCOUNTER — NURSING HOME VISIT (OUTPATIENT)
Dept: GERIATRICS | Facility: OTHER | Age: 69
End: 2024-03-13
Payer: MEDICARE

## 2024-03-13 DIAGNOSIS — E10.65 TYPE 1 DIABETES MELLITUS WITH HYPERGLYCEMIA (HCC): Primary | ICD-10-CM

## 2024-03-13 DIAGNOSIS — C96.6 LANGERHAN'S CELL HISTIOCYTOSIS (HCC): ICD-10-CM

## 2024-03-13 DIAGNOSIS — H53.8 BLURRY VISION, RIGHT EYE: ICD-10-CM

## 2024-03-13 DIAGNOSIS — I25.10 CORONARY ARTERY DISEASE INVOLVING NATIVE HEART WITHOUT ANGINA PECTORIS, UNSPECIFIED VESSEL OR LESION TYPE: ICD-10-CM

## 2024-03-13 DIAGNOSIS — Z71.89 ADVANCE CARE PLANNING: ICD-10-CM

## 2024-03-13 DIAGNOSIS — N40.1 BENIGN PROSTATIC HYPERPLASIA WITH NOCTURIA: ICD-10-CM

## 2024-03-13 DIAGNOSIS — E78.2 MIXED HYPERLIPIDEMIA: ICD-10-CM

## 2024-03-13 DIAGNOSIS — R35.1 BENIGN PROSTATIC HYPERPLASIA WITH NOCTURIA: ICD-10-CM

## 2024-03-13 DIAGNOSIS — F09 MILD COGNITIVE DISORDER: ICD-10-CM

## 2024-03-13 DIAGNOSIS — F51.01 PRIMARY INSOMNIA: ICD-10-CM

## 2024-03-13 DIAGNOSIS — I25.2 HX OF NON-ST ELEVATION MYOCARDIAL INFARCTION (NSTEMI): ICD-10-CM

## 2024-03-13 DIAGNOSIS — F41.9 ANXIETY: ICD-10-CM

## 2024-03-13 DIAGNOSIS — Z86.718 HISTORY OF DVT (DEEP VEIN THROMBOSIS): ICD-10-CM

## 2024-03-13 DIAGNOSIS — R26.2 AMBULATORY DYSFUNCTION: ICD-10-CM

## 2024-03-13 PROCEDURE — 99310 SBSQ NF CARE HIGH MDM 45: CPT | Performed by: STUDENT IN AN ORGANIZED HEALTH CARE EDUCATION/TRAINING PROGRAM

## 2024-03-13 NOTE — PROGRESS NOTES
[facility]  Facility: [facility]    PROGRESS NOTE  Nursing Home Place of Service: nursing home place of service: POS 32 Unskilled- No Part A Coverage    NAME: Tuan Remy  : 1955 AGE: 68 y.o. SEX: male MRN: [id number]  DATE OF ENCOUNTER: 3/13/2024    Assessment and Plan     Problem List Items Addressed This Visit       Ambulatory dysfunction     Encourage use of assistive device  Uses rolling walker  Continue fall precautions  Has been cleared by therapy to move around the building  Encourage patient to participate with activities  Provide education for patient, family, and caregivers         Blurry vision, right eye     Patient with markedly decreased vision in his right eye secondary to his underlying diabetes.  He does take latanoprost ophthalmic solution to the right eye at bedtime and does follow with ophthalmology at [facility]          Hyperlipidemia     lipid panel (23)  cholesterol-109  triglycerides-61  LDL-53  HDL-44  Being treated with a high-intensity statin d/t prior history of a NSTEMI, PVD, and CAD  Continue atorvastatin 80 mg po daily, appears to be tolerating well  Goal LDL <70 per Cardiology         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HCC)     Following Heme/Onc at [facility], last visit 24, note reviewed  Was treated in  with 2-CDA  Completed 2 years of zoledronic acid  Considered stable per Heme/Onc, no evidence of recurrent disease  No apparent acute/associated symptoms presently         Type 1 diabetes mellitus with hyperglycemia (HCC) - Primary       Lab Results   Component Value Date    HGBA1C 7.9 (H) 2023: A1c 7.5 (improved from 7.9 in 2023) - monitor every few months  Following Endocrinology, recent visit 3/5/24, ISF adjusted from 40 to 30  Monitor glucose - somewhat variable, generally 100s-200s but at times lower or higher  Avoid hypoglycemia  Continue insulin with sliding scale  Continue novolog 10 units with meals  Continue  "lantus 20u daily         Anxiety     Noted hx  Mood stable  Monitor off meds  Supportive care         Coronary artery disease without angina pectoris     Lexiscan stress test:  1. Infarct involving the left circumflex territory/lateral wall with moderate  sonam-infarct ischemia.  2. Normal left ventricle systolic function with ejection fraction of 55%.    Previous Cardiology visit 1/26/23:  \"NSTEMI as noted previously. Currently doing well.  He will continue aggressive risk modification.  He will continue his current medications.  No further cardiac testing is indicated at this time. \"    Recent Cardiology visit 2/23/24:  To continue aspirin, statin. Avoided beta blocker/ACE-I with hx of orthostatic hypotension.      No acute cardiac complaints presently  Continue aspirin, statin         History of DVT (deep vein thrombosis)     Follows Heme/Onc at [facility]  Hx of unprovoked VTE  To continue Eliquis for AC (lifelong per Hematology recommendation)         Hx of non-ST elevation myocardial infarction (NSTEMI)     See plan under CAD         Mild cognitive disorder     Noted hx in chart  Patient Aox3 and seems to be a fair historian today  May require redirection and reorientation  Encourage participation with group activities  Encourage staying mentally active with word searches, crossword puzzles, and suduko   Avoid deliriogenic medications   Continue 24/7 supportive care at [facility]         Primary insomnia     Stable  Continue melatonin  Supportive care, sleep hygiene         Benign prostatic hyperplasia with nocturia     BPH with LUTS  Following Urology, last visit 1/3/24, note reviewed  Started on flomax 0.4mg in Jan 2024  Monitor PSA routinely  Seems symptomatically stable         Advance care planning     Patient verbalizes HCP as sister Archana Story  Extensive discussion/education with patient today regarding their wishes with respect to resuscitative measures; discussed potential risks vs benefits of resuscitative " measures; patient verbalizes understanding, appears to have capacity to make this decision presently, and clearly verbalizes a desire for Full Code, consistent with prior                  Chief Complaint     Follow up 60 day    History of Present Illness     Tuan Remy is a 68 y.o. male who was seen today for follow up. PMH including type 1 DM, orthostatic hypotension, peripheral vascular disease, atherosclerotic heart disease, hyperlipidemia, LCH, hyperlipidemia     Patient seen and examined in room  Others present: none  Patient seated in chair  Appears comfortable, awake, alert, oriented to situation, able to converse appropriately  Polite, Aox3, in good spirits, appropriately smiling and making jokes at times. Notes he feels well overall. He wishes the meals were warmer sometimes. No acute concerns. No acute pain anywhere. Good appetite. Last BM yesterday, reports going regularly. Breathing fine. No acute cardiopulmonary, abdominal, or urinary symptoms; see ROS for more details.  Gets around mainly with walker and denies recent falls.  No further questions or acute concerns identified.  No acute concerns per nursing.  Patient follows with numerous specialists, recent consult notes reviewed and recommendations reviewed/reconciled.    Lab Review:   9/14/23: A1c 7.5 (improved from 7.9 in July 2023)  12/28/23: PSA WNL (2.45)  1/28/24: CBC with Hb 11.8; CMP generally non-actionable, glu 50, eGFR 75; last TSH WNL from April 2023      Echo Oct 2022: EF 60-65, normal diastolic function      The following portions of the patient's history were reviewed and updated as appropriate: allergies, current medications, past family history, past medical history, past social history, past surgical history and problem list.    Review of Systems     Review of Systems   Constitutional:  Negative for appetite change, chills, diaphoresis and fever.   HENT:  Negative for drooling, ear pain, hearing loss, rhinorrhea, sore throat and  trouble swallowing.    Eyes:  Negative for pain, discharge, redness, itching and visual disturbance.   Respiratory:  Negative for cough, chest tightness, shortness of breath and wheezing.    Cardiovascular:  Positive for leg swelling (mild, baseline). Negative for chest pain and palpitations.   Gastrointestinal:  Negative for abdominal pain, blood in stool, constipation, diarrhea and vomiting.   Genitourinary:  Negative for difficulty urinating, dysuria, flank pain and hematuria.   Musculoskeletal:  Positive for gait problem. Negative for arthralgias, back pain and neck pain.   Skin:  Negative for color change.   Neurological:  Negative for dizziness, tremors, facial asymmetry, speech difficulty, weakness and headaches.   Psychiatric/Behavioral:  Negative for agitation, behavioral problems and confusion. The patient is not nervous/anxious and is not hyperactive.        Active Problem List     Patient Active Problem List   Diagnosis    Ambulatory dysfunction    Blurry vision, right eye    Hyperlipidemia    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HCC)    Orthostatic hypotension    Peripheral vascular disease, unspecified (HCC)    Type 1 diabetes mellitus with hyperglycemia (HCC)    Sinus tachycardia    Anxiety    Reactive depression    Coronary artery disease without angina pectoris    Acute kidney injury superimposed on chronic kidney disease     History of DVT (deep vein thrombosis)    Hyperlipidemia due to type 1 diabetes mellitus     Other constipation    CKD (chronic kidney disease) stage 2, GFR 60-89 ml/min    Urinary frequency    Hx of non-ST elevation myocardial infarction (NSTEMI)    Mild cognitive disorder    Primary insomnia    Benign prostatic hyperplasia with nocturia    Advance care planning       Objective     Vital Signs:     BP: 128/74 mmHg  3/8/2024 17:55   Temp:97.7 °F  1/12/2024 11:05 Pulse:75 bpm  3/8/2024 09:30 Weight:205.2 Lbs  3/1/2024 11:50   Resp:18 Breaths/min  1/12/2024 11:05 BS:137  mg/dL  3/13/2024 18:17 O2:97 %  10/6/2023 21:50 Pain:0  3/13/2024 16:07       Physical Exam  Vitals reviewed.   Constitutional:       General: He is not in acute distress.     Appearance: He is not toxic-appearing or diaphoretic.   HENT:      Head: Normocephalic and atraumatic.      Right Ear: External ear normal.      Left Ear: External ear normal.      Nose: Nose normal. No rhinorrhea.      Mouth/Throat:      Mouth: Mucous membranes are moist.      Pharynx: Oropharynx is clear. No oropharyngeal exudate or posterior oropharyngeal erythema.   Eyes:      General: No scleral icterus.        Right eye: No discharge.         Left eye: No discharge.      Extraocular Movements: Extraocular movements intact.      Conjunctiva/sclera: Conjunctivae normal.      Pupils: Pupils are equal, round, and reactive to light.   Cardiovascular:      Rate and Rhythm: Normal rate and regular rhythm.   Pulmonary:      Effort: Pulmonary effort is normal. No respiratory distress.      Breath sounds: No wheezing or rales.   Abdominal:      General: Bowel sounds are normal.      Palpations: Abdomen is soft.      Tenderness: There is no abdominal tenderness. There is no guarding.   Musculoskeletal:         General: No tenderness.      Cervical back: No rigidity.      Comments: Trace bilateral LE edema (chronic/stable per patient)   Skin:     General: Skin is warm and dry.      Coloration: Skin is not jaundiced.   Neurological:      General: No focal deficit present.      Mental Status: He is alert and oriented to person, place, and time. Mental status is at baseline.   Psychiatric:         Mood and Affect: Mood normal.         Behavior: Behavior normal.         Thought Content: Thought content normal.         Judgment: Judgment normal.         Pertinent Laboratory/Diagnostic Studies:  Laboratory and Imaging studies reviewed. Full report in the paper chart.     Current Medications   Medications reviewed and updated in facility chart.      -Total  time spent on this encounter today including documentation and workup review, face to face time, history and exam, and documentation/orders was approximately 50 minutes.  -This note will be copied to [facility] EMR where vitals and medication orders are placed.    Ryan Chester D.O.  Geriatric Medicine  3/14/2024 10:46 AM

## 2024-03-14 PROBLEM — N40.1 BENIGN PROSTATIC HYPERPLASIA WITH NOCTURIA: Status: ACTIVE | Noted: 2024-03-14

## 2024-03-14 PROBLEM — R35.1 BENIGN PROSTATIC HYPERPLASIA WITH NOCTURIA: Status: ACTIVE | Noted: 2024-03-14

## 2024-03-14 PROBLEM — Z71.89 ADVANCE CARE PLANNING: Status: ACTIVE | Noted: 2024-03-14

## 2024-03-14 NOTE — ASSESSMENT & PLAN NOTE
Patient with markedly decreased vision in his right eye secondary to his underlying diabetes.  He does take latanoprost ophthalmic solution to the right eye at bedtime and does follow with ophthalmology at [facility]

## 2024-03-14 NOTE — ASSESSMENT & PLAN NOTE
lipid panel (04/06/23)  cholesterol-109  triglycerides-61  LDL-53  HDL-44  Being treated with a high-intensity statin d/t prior history of a NSTEMI, PVD, and CAD  Continue atorvastatin 80 mg po daily, appears to be tolerating well  Goal LDL <70 per Cardiology

## 2024-03-14 NOTE — ASSESSMENT & PLAN NOTE
Lab Results   Component Value Date    HGBA1C 7.9 (H) 07/06/2023 9/14/23: A1c 7.5 (improved from 7.9 in July 2023) - monitor every few months  Following Endocrinology, recent visit 3/5/24, ISF adjusted from 40 to 30  Monitor glucose - somewhat variable, generally 100s-200s but at times lower or higher  Avoid hypoglycemia  Continue insulin with sliding scale  Continue novolog 10 units with meals  Continue lantus 20u daily

## 2024-03-14 NOTE — ASSESSMENT & PLAN NOTE
Follows Heme/Onc at [facility]  Hx of unprovoked VTE  To continue Eliquis for AC (lifelong per Hematology recommendation)

## 2024-03-14 NOTE — ASSESSMENT & PLAN NOTE
Patient verbalizes HCP as sister Archana Story  Extensive discussion/education with patient today regarding their wishes with respect to resuscitative measures; discussed potential risks vs benefits of resuscitative measures; patient verbalizes understanding, appears to have capacity to make this decision presently, and clearly verbalizes a desire for Full Code, consistent with prior

## 2024-03-14 NOTE — ASSESSMENT & PLAN NOTE
Noted hx in chart  Patient Aox3 and seems to be a fair historian today  May require redirection and reorientation  Encourage participation with group activities  Encourage staying mentally active with word searches, crossword puzzles, and suduko   Avoid deliriogenic medications   Continue 24/7 supportive care at [facility]

## 2024-03-14 NOTE — ASSESSMENT & PLAN NOTE
Following Heme/Onc at [facility], last visit 2/8/24, note reviewed  Was treated in 2013 with 2-CDA  Completed 2 years of zoledronic acid  Considered stable per Heme/Onc, no evidence of recurrent disease  No apparent acute/associated symptoms presently

## 2024-03-14 NOTE — ASSESSMENT & PLAN NOTE
"Lexiscan stress test:  1. Infarct involving the left circumflex territory/lateral wall with moderate  sonam-infarct ischemia.  2. Normal left ventricle systolic function with ejection fraction of 55%.    Previous Cardiology visit 1/26/23:  \"NSTEMI as noted previously. Currently doing well.  He will continue aggressive risk modification.  He will continue his current medications.  No further cardiac testing is indicated at this time. \"    Recent Cardiology visit 2/23/24:  To continue aspirin, statin. Avoided beta blocker/ACE-I with hx of orthostatic hypotension.      No acute cardiac complaints presently  Continue aspirin, statin  "

## 2024-03-14 NOTE — ASSESSMENT & PLAN NOTE
BPH with LUTS  Following Urology, last visit 1/3/24, note reviewed  Started on flomax 0.4mg in Jan 2024  Monitor PSA routinely  Seems symptomatically stable

## 2024-04-12 ENCOUNTER — NURSING HOME VISIT (OUTPATIENT)
Dept: GERIATRICS | Facility: OTHER | Age: 69
End: 2024-04-12
Payer: MEDICARE

## 2024-04-12 DIAGNOSIS — F51.01 PRIMARY INSOMNIA: ICD-10-CM

## 2024-04-12 DIAGNOSIS — E10.65 TYPE 1 DIABETES MELLITUS WITH HYPERGLYCEMIA (HCC): Primary | ICD-10-CM

## 2024-04-12 DIAGNOSIS — N18.2 CKD (CHRONIC KIDNEY DISEASE) STAGE 2, GFR 60-89 ML/MIN: ICD-10-CM

## 2024-04-12 DIAGNOSIS — E78.2 MIXED HYPERLIPIDEMIA: ICD-10-CM

## 2024-04-12 DIAGNOSIS — I73.9 PERIPHERAL VASCULAR DISEASE, UNSPECIFIED (HCC): ICD-10-CM

## 2024-04-12 DIAGNOSIS — F09 MILD COGNITIVE DISORDER: ICD-10-CM

## 2024-04-12 DIAGNOSIS — R26.2 AMBULATORY DYSFUNCTION: ICD-10-CM

## 2024-04-12 PROCEDURE — 99309 SBSQ NF CARE MODERATE MDM 30: CPT

## 2024-04-21 VITALS
OXYGEN SATURATION: 97 % | BODY MASS INDEX: 29.44 KG/M2 | SYSTOLIC BLOOD PRESSURE: 128 MMHG | TEMPERATURE: 97.9 F | RESPIRATION RATE: 16 BRPM | WEIGHT: 205.2 LBS | DIASTOLIC BLOOD PRESSURE: 70 MMHG | HEART RATE: 88 BPM

## 2024-04-22 NOTE — PROGRESS NOTES
[address], [address], [place] [address]  [phone]    NAME: Tuan Remy  AGE: 69 y.o. SEX: male    Progress Note    Location: [facility]  POS: 32 ([facility])  Code Status: Full Code    Chief complaint / Reason for visit:  Follow-up visit    Assessment/Plan:    Hyperlipidemia  lipid panel (04/06/23)  cholesterol-109  triglycerides-61  LDL-53  HDL-44  Being treated with a high-intensity statin d/t prior history of a NSTEMI, PVD, and CAD  Continue atorvastatin 80 mg po daily  No adverse effects noted from resident  Continue to monitor yearly   Next lipid panel scheduled for 07/01/24    Primary insomnia  First line is behavioral therapy  Avoid sedative hypnotics such as benzodiazepines and benadryl  Encourage staying awake during the day  Encourage daytime activity, morning exercise  Decrease or eliminate day time naps  Avoid caffiene, alcohol especially during late afternoon and evening hours  Establish a night time routine  Recent change of rooms to help with sleeping at night  Continue melatonin 3 mg po daily at bedtime      Ambulatory dysfunction  Encourage use of assistive device  Uses rolling walker  Continue fall precautions  Has been cleared by therapy to move around the building  Encourage patient to participate with activities  Provide education for patient, family, and caregivers    CKD (chronic kidney disease) stage 2, GFR 60-89 ml/min  Lab Results   Component Value Date    EGFR 83 07/31/2023    EGFR 82 07/06/2023    EGFR 86 06/27/2023    CREATININE 0.99 07/31/2023    CREATININE 1.00 07/06/2023    CREATININE 0.96 06/27/2023     BMP on 04/10/24  Creatinine - 1.06  BUN- 22  eGFR - 76  Continue to avoid nephrotoxic medications  Monitor routine labs    Mild cognitive disorder  Continues to be alert and oriented x 3 on exam  Does require some assistance with ADLs at facility   May require redirection and reorientation  Encourage participation with group  activities  Encourage staying mentally active with word searches, crossword puzzles, and suduko   Avoid deliriogenic medications   Continue 24/7 supportive care at LT    Type 1 diabetes mellitus with hyperglycemia (HCC)  HgA1c on 04/10/24 was 7.7  Encourage LASHAE  AC HS blood glucose checks  Blood glucose checks reviewed from facility records  Stable   Avoid hypoglycemia  Hypoglycemia protocol  Continue novolog 10 units TID with meals  Continue lantus 20 units SQ at lunch  Repeat HgA1c Q3 months   Follow up with Endocrinology on 07/10/24    Peripheral vascular disease, unspecified (HCC)  Stable  Continue aspirin 81 mg po daily  Continue atorvastatin 80 mg po daily        This is a 69 y.o. male seen today at [facility]. Medical history includes, not limited to, type 1 DM, orthostatic hypotension, peripheral vascular disease, atherosclerotic heart disease, hyperlipidemia, CKD stage 2, hyperlipidemia, and dementia.    Resident is seen today for a routine follow up visit. Upon entering resident's room today he is sitting out of bed in his chair.  He is alert and oriented x 3.  Able to answer questions appropriately.  He currently denies pain.  States he has been ambulating with his rolling walker, ambulating more throughout the building.  He recently has switched rooms which has helped him sleep better at night.        Reviewed resident with nursing staff.  Reviewed recent labs, vitals, and follow up appointments.           Nursing and prior provider notes reviewed on this visit. Discussed visit with PCP and nursing staff/ supervisor.      Review of Systems   Constitutional:  Negative for activity change, appetite change, fatigue and fever.   HENT:  Negative for congestion and rhinorrhea.    Eyes:  Negative for pain and visual disturbance.   Respiratory:  Negative for cough and shortness of breath.    Cardiovascular:  Negative for chest pain and leg swelling.   Gastrointestinal:  Negative for abdominal pain and  constipation.   Genitourinary:  Negative for difficulty urinating and dysuria.   Musculoskeletal:  Negative for arthralgias.   Skin:  Negative for color change and rash.   Neurological:  Negative for dizziness, syncope and weakness.   Psychiatric/Behavioral:  Positive for sleep disturbance. Negative for behavioral problems and confusion.    All other systems reviewed and are negative.         ALLERGY: Reviewed, unchanged  No Known Allergies    HISTORY:  Medical Hx: Reviewed, unchanged  Family Hx: Reviewed, unchanged  Soc Hx: Reviewed,  unchanged      Physical Exam  Vitals reviewed.   Constitutional:       General: He is not in acute distress.     Appearance: Normal appearance. He is not ill-appearing.   HENT:      Head: Normocephalic.      Right Ear: Tympanic membrane normal.      Left Ear: Tympanic membrane normal.      Nose: Nose normal. No congestion.      Mouth/Throat:      Mouth: Mucous membranes are moist.      Pharynx: Oropharynx is clear.   Eyes:      General:         Right eye: No discharge.         Left eye: No discharge.      Extraocular Movements: Extraocular movements intact.      Conjunctiva/sclera: Conjunctivae normal.      Pupils: Pupils are equal, round, and reactive to light.   Cardiovascular:      Rate and Rhythm: Normal rate and regular rhythm.      Pulses: Normal pulses.      Heart sounds: Normal heart sounds.   Pulmonary:      Effort: Pulmonary effort is normal. No respiratory distress.      Breath sounds: Normal breath sounds.   Chest:      Chest wall: No tenderness.   Abdominal:      General: Bowel sounds are normal.      Palpations: Abdomen is soft.      Tenderness: There is no abdominal tenderness.   Musculoskeletal:         General: No swelling. Normal range of motion.      Cervical back: Normal range of motion.      Right lower leg: No edema.      Left lower leg: No edema.   Skin:     General: Skin is warm and dry.   Neurological:      Mental Status: He is alert and oriented to person,  "place, and time. Mental status is at baseline.      Motor: No weakness.   Psychiatric:         Mood and Affect: Mood normal.         Behavior: Behavior normal.         Thought Content: Thought content normal.         Judgment: Judgment normal.            Laboratory results / Imaging reviewed: Hard copy/ies in medical chart:    Vitals:    04/21/24 2048   BP: 128/70   Pulse: 88   Resp: 16   Temp: 97.9 °F (36.6 °C)   SpO2: 97%       Current Medications:  All medications reviewed and updated in Nursing Home Chart    Please note: This note was completed in part utilizing a voice-recognition software may have been used in the preparation of this document. Grammatical errors, random word insertion, spelling mistakes, and incomplete sentences may be an occasional consequence of the system secondary to software limitations, ambient noise and hardware issues. Occasional wrong word or \"sound-alike\" substitutions may have occurred due to the inherent limitations of voice recognition software. At the time of dictation, efforts were made to edit, clarify and/or correct errors. Interpretation should be guided by context. Please read the chart carefully and recognize, using context, where substitutions have occurred. If you have any questions or concerns about the context, text or information contained within the body of this dictation, please contact myself, the provider, for further clarification.      TAM Joseph  4/21/2024  "

## 2024-04-22 NOTE — ASSESSMENT & PLAN NOTE
First line is behavioral therapy  Avoid sedative hypnotics such as benzodiazepines and benadryl  Encourage staying awake during the day  Encourage daytime activity, morning exercise  Decrease or eliminate day time naps  Avoid caffiene, alcohol especially during late afternoon and evening hours  Establish a night time routine  Recent change of rooms to help with sleeping at night  Continue melatonin 3 mg po daily at bedtime

## 2024-04-22 NOTE — ASSESSMENT & PLAN NOTE
Continues to be alert and oriented x 3 on exam  Does require some assistance with ADLs at facility   May require redirection and reorientation  Encourage participation with group activities  Encourage staying mentally active with word searches, crossword puzzles, and suduko   Avoid deliriogenic medications   Continue 24/7 supportive care at LTC

## 2024-04-22 NOTE — ASSESSMENT & PLAN NOTE
lipid panel (04/06/23)  cholesterol-109  triglycerides-61  LDL-53  HDL-44  Being treated with a high-intensity statin d/t prior history of a NSTEMI, PVD, and CAD  Continue atorvastatin 80 mg po daily  No adverse effects noted from resident  Continue to monitor yearly   Next lipid panel scheduled for 07/01/24

## 2024-04-22 NOTE — ASSESSMENT & PLAN NOTE
HgA1c on 04/10/24 was 7.7  Encourage CCD  AC HS blood glucose checks  Blood glucose checks reviewed from facility records  Stable   Avoid hypoglycemia  Hypoglycemia protocol  Continue novolog 10 units TID with meals  Continue lantus 20 units SQ at lunch  Repeat HgA1c Q3 months   Follow up with Endocrinology on 07/10/24

## 2024-04-22 NOTE — ASSESSMENT & PLAN NOTE
Lab Results   Component Value Date    EGFR 83 07/31/2023    EGFR 82 07/06/2023    EGFR 86 06/27/2023    CREATININE 0.99 07/31/2023    CREATININE 1.00 07/06/2023    CREATININE 0.96 06/27/2023     BMP on 04/10/24  Creatinine - 1.06  BUN- 22  eGFR - 76  Continue to avoid nephrotoxic medications  Monitor routine labs

## 2024-05-15 ENCOUNTER — NURSING HOME VISIT (OUTPATIENT)
Dept: GERIATRICS | Facility: OTHER | Age: 69
End: 2024-05-15
Payer: MEDICARE

## 2024-05-15 DIAGNOSIS — Z71.89 ADVANCE CARE PLANNING: ICD-10-CM

## 2024-05-15 DIAGNOSIS — I73.9 PERIPHERAL VASCULAR DISEASE, UNSPECIFIED (HCC): ICD-10-CM

## 2024-05-15 DIAGNOSIS — I25.2 HX OF NON-ST ELEVATION MYOCARDIAL INFARCTION (NSTEMI): ICD-10-CM

## 2024-05-15 DIAGNOSIS — Z86.718 HISTORY OF DVT (DEEP VEIN THROMBOSIS): ICD-10-CM

## 2024-05-15 DIAGNOSIS — E10.65 TYPE 1 DIABETES MELLITUS WITH HYPERGLYCEMIA (HCC): Primary | ICD-10-CM

## 2024-05-15 DIAGNOSIS — E78.2 MIXED HYPERLIPIDEMIA: ICD-10-CM

## 2024-05-15 DIAGNOSIS — R35.1 BENIGN PROSTATIC HYPERPLASIA WITH NOCTURIA: ICD-10-CM

## 2024-05-15 DIAGNOSIS — H53.8 BLURRY VISION, RIGHT EYE: ICD-10-CM

## 2024-05-15 DIAGNOSIS — R26.2 AMBULATORY DYSFUNCTION: ICD-10-CM

## 2024-05-15 DIAGNOSIS — F51.01 PRIMARY INSOMNIA: ICD-10-CM

## 2024-05-15 DIAGNOSIS — N40.1 BENIGN PROSTATIC HYPERPLASIA WITH NOCTURIA: ICD-10-CM

## 2024-05-15 DIAGNOSIS — F41.9 ANXIETY: ICD-10-CM

## 2024-05-15 DIAGNOSIS — I25.10 CORONARY ARTERY DISEASE INVOLVING NATIVE HEART WITHOUT ANGINA PECTORIS, UNSPECIFIED VESSEL OR LESION TYPE: ICD-10-CM

## 2024-05-15 DIAGNOSIS — F09 MILD COGNITIVE DISORDER: ICD-10-CM

## 2024-05-15 DIAGNOSIS — C96.6 LANGERHAN'S CELL HISTIOCYTOSIS (HCC): ICD-10-CM

## 2024-05-15 PROCEDURE — 99309 SBSQ NF CARE MODERATE MDM 30: CPT | Performed by: STUDENT IN AN ORGANIZED HEALTH CARE EDUCATION/TRAINING PROGRAM

## 2024-05-15 NOTE — ASSESSMENT & PLAN NOTE
Lab Results   Component Value Date    HGBA1C 7.9 (H) 07/06/2023     Monitor A1c routinely  Following Endocrinology, recent visit March 2024  Monitor glucose - somewhat variable, fasting generally low-mid 100s, later generally 100s-200s  Avoid hypoglycemia  Continue insulin with sliding scale  Continue novolog 10 units with meals  Continue lantus 20u daily  Follow up with Endocrine

## 2024-05-15 NOTE — ASSESSMENT & PLAN NOTE
Extensively discussed ACP at a prior visit ~3/14/24  Briefly revisited today to confirm/evaluate for changes in patient's wishes  Patient with sufficient capacity, confirms HCP as sister Archana and desire to remain full code

## 2024-05-15 NOTE — ASSESSMENT & PLAN NOTE
"Lexiscan stress test:  1. Infarct involving the left circumflex territory/lateral wall with moderate  sonam-infarct ischemia.  2. Normal left ventricle systolic function with ejection fraction of 55%.     Previous Cardiology visit 1/26/23:  \"NSTEMI as noted previously. Currently doing well.  He will continue aggressive risk modification.  He will continue his current medications.  No further cardiac testing is indicated at this time. \"     Recent Cardiology visit 2/23/24:  To continue aspirin, statin. Avoided beta blocker/ACE-I with hx of orthostatic hypotension.        No acute cardiac complaints, seems stable  Continue aspirin, statin  "

## 2024-05-15 NOTE — ASSESSMENT & PLAN NOTE
Patient with markedly decreased vision in his right eye, chronic issue, secondary to his underlying diabetes. Stable.  He does take latanoprost ophthalmic solution to the right eye at bedtime and does follow with ophthalmology at [facility]    Yes

## 2024-05-15 NOTE — ASSESSMENT & PLAN NOTE
Following Heme/Onc at [facility], last visit 2/8/24, note reviewed  Was treated in 2013 with 2-CDA  Completed 2 years of zoledronic acid  Considered stable per Heme/Onc, no evidence of recurrent disease  No apparent acute/associated symptoms presently

## 2024-05-15 NOTE — ASSESSMENT & PLAN NOTE
BPH with LUTS  Following Urology, recent visit in Jan 2024  Started on flomax 0.4mg in Jan 2024  Monitor PSA routinely  Seems symptomatically stable

## 2024-05-15 NOTE — PROGRESS NOTES
[facility]  Facility: [facility]    PROGRESS NOTE  Nursing Home Place of Service: nursing home place of service: POS 32 Unskilled- No Part A Coverage    NAME: Tuan Remy  : 1955 AGE: 69 y.o. SEX: male MRN: [id number]  DATE OF ENCOUNTER: 5/15/2024    Assessment and Plan     Problem List Items Addressed This Visit       Ambulatory dysfunction     Encourage use of assistive device  Uses rolling walker  Continue fall precautions  Has been cleared by therapy to move around the building  Encourage patient to participate with activities  Provide education for patient, family, and caregivers         Blurry vision, right eye     Patient with markedly decreased vision in his right eye, chronic issue, secondary to his underlying diabetes. Stable.  He does take latanoprost ophthalmic solution to the right eye at bedtime and does follow with ophthalmology at [facility]          Hyperlipidemia     lipid panel (23)  cholesterol-109  triglycerides-61  LDL-53  HDL-44  Being treated with a high-intensity statin d/t prior history of a NSTEMI, PVD, and CAD  Continue atorvastatin 80 mg po daily, appears to be tolerating well  Goal LDL <70 per Cardiology         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HCC)     Following Heme/Onc at [facility], last visit 24, note reviewed  Was treated in  with 2-CDA  Completed 2 years of zoledronic acid  Considered stable per Heme/Onc, no evidence of recurrent disease  No apparent acute/associated symptoms presently         Peripheral vascular disease, unspecified (HCC)     Noted history  No apparent acute/associated concerns, seems stable  Continue aspirin, statin         Type 1 diabetes mellitus with hyperglycemia (HCC) - Primary       Lab Results   Component Value Date    HGBA1C 7.9 (H) 2023     Monitor A1c routinely  Following Endocrinology, recent visit 2024  Monitor glucose - somewhat variable, fasting generally low-mid 100s, later generally  "100s-200s  Avoid hypoglycemia  Continue insulin with sliding scale  Continue novolog 10 units with meals  Continue lantus 20u daily  Follow up with Endocrine         Anxiety     Noted hx  Mood stable, good spirits  Monitor off meds  Supportive care         Coronary artery disease without angina pectoris     Lexiscan stress test:  1. Infarct involving the left circumflex territory/lateral wall with moderate  sonam-infarct ischemia.  2. Normal left ventricle systolic function with ejection fraction of 55%.     Previous Cardiology visit 1/26/23:  \"NSTEMI as noted previously. Currently doing well.  He will continue aggressive risk modification.  He will continue his current medications.  No further cardiac testing is indicated at this time. \"     Recent Cardiology visit 2/23/24:  To continue aspirin, statin. Avoided beta blocker/ACE-I with hx of orthostatic hypotension.        No acute cardiac complaints, seems stable  Continue aspirin, statin         History of DVT (deep vein thrombosis)     Follows Heme/Onc at [facility]  Hx of unprovoked VTE  To continue Eliquis for AC (lifelong per Hematology recommendation)         Hx of non-ST elevation myocardial infarction (NSTEMI)     See plan under CAD          Mild cognitive disorder     Noted hx in chart  Patient Aox3 and seems to be a fair historian today  May require redirection and reorientation  Encourage participation with group activities  Encourage staying mentally active with word searches, crossword puzzles, and suduko   Avoid deliriogenic medications   Continue 24/7 supportive care at LTC         Primary insomnia     Stable  Continue melatonin  Supportive care, sleep hygiene         Benign prostatic hyperplasia with nocturia     BPH with LUTS  Following Urology, recent visit in Jan 2024  Started on flomax 0.4mg in Jan 2024  Monitor PSA routinely  Seems symptomatically stable         Advance care planning     Extensively discussed ACP at a prior visit ~3/14/24  Briefly " revisited today to confirm/evaluate for changes in patient's wishes  Patient with sufficient capacity, confirms HCP as sister Archana and desire to remain full code                Chief Complaint     Follow up 60 day    History of Present Illness     Tuan Remy is a 69 y.o. male who was seen today for follow up. PMH including type 1 DM, orthostatic hypotension, peripheral vascular disease, atherosclerotic heart disease, hyperlipidemia, LCH, hyperlipidemia        Patient seen and examined in room  Others present: none  Patient seated in chair  Appears comfortable, awake, alert, oriented to situation, able to converse appropriately  Polite, Aox3, in good spirits. Notes he feels well overall. He does not like the food options always. No acute concerns. No acute pain anywhere. Good appetite, no acute swallowing concerns. Reports generally daily regular BM. Breathing fine. No acute cardiopulmonary, abdominal, or urinary symptoms; see ROS for more details. He does not feel sick or confused. He keeps active and enjoys reading with some books at bedside presently.  Gets around mainly with walker and denies recent falls.  No further questions or acute concerns identified.  No acute concerns per NP or nursing.     Lab Review:   9/14/23: A1c 7.5 (improved from 7.9 in July 2023)  12/28/23: PSA WNL (2.45)  1/29/24: CBC with Hb 11.8; CMP generally non-actionable, glu 50, eGFR 75; last TSH WNL from April 2023  4/10/24: CBC with Hb 11.4; BMP generally stable/non-actionable, eGFR 76; A1c 7.7        Echo Oct 2022: EF 60-65, normal diastolic function       The following portions of the patient's history were reviewed and updated as appropriate: allergies, current medications, past family history, past medical history, past social history, past surgical history and problem list.    Review of Systems     Review of Systems   Constitutional:  Negative for appetite change, chills, diaphoresis and fever.   HENT:  Negative for drooling,  ear pain, hearing loss, rhinorrhea, sore throat and trouble swallowing.    Eyes:  Negative for pain, discharge, redness, itching and visual disturbance.   Respiratory:  Negative for cough, chest tightness, shortness of breath and wheezing.    Cardiovascular:  Positive for leg swelling (mild, baseline). Negative for chest pain and palpitations.   Gastrointestinal:  Negative for abdominal pain, blood in stool, constipation, diarrhea and vomiting.   Genitourinary:  Negative for difficulty urinating, dysuria, flank pain and hematuria.   Musculoskeletal:  Positive for gait problem. Negative for arthralgias, back pain and neck pain.   Skin:  Negative for color change.   Neurological:  Negative for dizziness, tremors, facial asymmetry, speech difficulty, weakness and headaches.   Psychiatric/Behavioral:  Negative for agitation, behavioral problems and confusion. The patient is not nervous/anxious and is not hyperactive.        Active Problem List     Patient Active Problem List   Diagnosis    Ambulatory dysfunction    Blurry vision, right eye    Hyperlipidemia    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HCC)    Orthostatic hypotension    Peripheral vascular disease, unspecified (HCC)    Type 1 diabetes mellitus with hyperglycemia (HCC)    Sinus tachycardia    Anxiety    Reactive depression    Coronary artery disease without angina pectoris    Acute kidney injury superimposed on chronic kidney disease  (HCC)    History of DVT (deep vein thrombosis)    Hyperlipidemia due to type 1 diabetes mellitus  (HCC)    Other constipation    CKD (chronic kidney disease) stage 2, GFR 60-89 ml/min    Urinary frequency    Hx of non-ST elevation myocardial infarction (NSTEMI)    Mild cognitive disorder    Primary insomnia    Benign prostatic hyperplasia with nocturia    Advance care planning       Objective     Vital Signs:     BP: 131/70 mmHg  5/10/2024 16:25 Temp:97.9 °F  3/15/2024 06:19 Pulse:81 bpm  5/10/2024 08:00 Weight:203.8 Lbs  5/1/2024  11:48   Resp:16 Breaths/min  3/19/2024 06:24 BS:105 mg/dL  5/15/2024 19:01 O2:97 %  10/6/2023 21:50 Pain:0  5/15/2024 15:49       Physical Exam  Vitals reviewed.   Constitutional:       General: He is not in acute distress.     Appearance: He is not toxic-appearing or diaphoretic.   HENT:      Head: Normocephalic and atraumatic.      Right Ear: External ear normal.      Left Ear: External ear normal.      Nose: Nose normal. No rhinorrhea.      Mouth/Throat:      Mouth: Mucous membranes are moist.      Pharynx: Oropharynx is clear. No oropharyngeal exudate or posterior oropharyngeal erythema.   Eyes:      General: No scleral icterus.        Right eye: No discharge.         Left eye: No discharge.      Extraocular Movements: Extraocular movements intact.      Conjunctiva/sclera: Conjunctivae normal.      Pupils: Pupils are equal, round, and reactive to light.   Cardiovascular:      Rate and Rhythm: Normal rate and regular rhythm.   Pulmonary:      Effort: Pulmonary effort is normal. No respiratory distress.      Breath sounds: No wheezing or rales.   Abdominal:      General: Bowel sounds are normal.      Palpations: Abdomen is soft.      Tenderness: There is no abdominal tenderness. There is no guarding.   Musculoskeletal:         General: No tenderness.      Cervical back: No rigidity.      Comments: Trace bilateral LE edema (chronic/stable per patient)   Skin:     General: Skin is warm and dry.      Coloration: Skin is not jaundiced.   Neurological:      General: No focal deficit present.      Mental Status: He is alert and oriented to person, place, and time. Mental status is at baseline.   Psychiatric:         Mood and Affect: Mood normal.         Behavior: Behavior normal.         Thought Content: Thought content normal.         Judgment: Judgment normal.         Pertinent Laboratory/Diagnostic Studies:  Laboratory and Imaging studies reviewed. Full report in the paper chart.     Current Medications   Medications  reviewed and updated in facility chart.      -Total time spent on this encounter today including documentation and workup review, face to face time, history and exam, and documentation/orders was approximately 35 minutes.  -This note will be copied to [facility] EMR where vitals and medication orders are placed.    Ryan Chester D.O.  Geriatric Medicine  5/15/2024 7:24 PM

## 2024-06-11 ENCOUNTER — NURSING HOME VISIT (OUTPATIENT)
Dept: GERIATRICS | Facility: OTHER | Age: 69
End: 2024-06-11
Payer: MEDICARE

## 2024-06-11 DIAGNOSIS — F09 MILD COGNITIVE DISORDER: ICD-10-CM

## 2024-06-11 DIAGNOSIS — E10.65 TYPE 1 DIABETES MELLITUS WITH HYPERGLYCEMIA (HCC): Primary | ICD-10-CM

## 2024-06-11 DIAGNOSIS — N18.2 CKD (CHRONIC KIDNEY DISEASE) STAGE 2, GFR 60-89 ML/MIN: ICD-10-CM

## 2024-06-11 DIAGNOSIS — R26.2 AMBULATORY DYSFUNCTION: ICD-10-CM

## 2024-06-11 DIAGNOSIS — I73.9 PERIPHERAL VASCULAR DISEASE, UNSPECIFIED (HCC): ICD-10-CM

## 2024-06-11 DIAGNOSIS — F51.01 PRIMARY INSOMNIA: ICD-10-CM

## 2024-06-11 PROCEDURE — 99309 SBSQ NF CARE MODERATE MDM 30: CPT

## 2024-06-24 VITALS
RESPIRATION RATE: 16 BRPM | OXYGEN SATURATION: 97 % | TEMPERATURE: 97.9 F | DIASTOLIC BLOOD PRESSURE: 66 MMHG | HEART RATE: 70 BPM | SYSTOLIC BLOOD PRESSURE: 122 MMHG | WEIGHT: 202.1 LBS | BODY MASS INDEX: 29 KG/M2

## 2024-06-25 NOTE — ASSESSMENT & PLAN NOTE
HgA1c on 04/10/24 was 7.7  Encourage CCD  AC HS blood glucose checks  Blood glucose checks reviewed from facility records  Stable   Avoid hypoglycemia  Hypoglycemia protocol  Continue novolog 10 units TID with meals  Continue lantus 20 units SQ at lunch  Repeat HgA1c Q3 months   Labs ordered for 07/01 prior to next endocrine appointment  Follow up with Endocrinology on 07/10/24

## 2024-06-25 NOTE — ASSESSMENT & PLAN NOTE
Maintains ambulatory status of independent with rolling walker  Encourage use of assistive device  Continue fall precautions  No recent fall reported from facility   Encourage patient to participate with activities  Provide education for patient, family, and caregivers

## 2024-06-25 NOTE — PROGRESS NOTES
[address], [address], [place] [address]  [phone]    NAME: Tuan Remy  AGE: 69 y.o. SEX: male    Progress Note    Location: [facility]  POS: 32 ([facility])  Code Status: Full Code    Chief complaint / Reason for visit:  Follow-up visit    Assessment/Plan:    Ambulatory dysfunction  Maintains ambulatory status of independent with rolling walker  Encourage use of assistive device  Continue fall precautions  No recent fall reported from facility   Encourage patient to participate with activities  Provide education for patient, family, and caregivers    Primary insomnia  First line is behavioral therapy  Avoid sedative hypnotics such as benzodiazepines and benadryl  Encourage staying awake during the day  Encourage daytime activity, morning exercise  Decrease or eliminate day time naps  Avoid caffiene, alcohol especially during late afternoon and evening hours  Establish a night time routine  Recent change of rooms to help with sleeping at night  Continue melatonin 3 mg po daily at bedtime      CKD (chronic kidney disease) stage 2, GFR 60-89 ml/min  Lab Results   Component Value Date    EGFR 83 07/31/2023    EGFR 82 07/06/2023    EGFR 86 06/27/2023    CREATININE 0.99 07/31/2023    CREATININE 1.00 07/06/2023    CREATININE 0.96 06/27/2023     BMP on 04/10/24  Creatinine - 1.06  BUN- 22  eGFR - 76  Continue to avoid nephrotoxic medications  Monitor routine labs  Routine labs ordered for 07/01    Mild cognitive disorder  Continues to be alert and oriented x 3 on exam  Does require some assistance with ADLs at facility   May require redirection and reorientation  Encourage participation with group activities  Encourage staying mentally active with word searches, crossword puzzles, and suduko   Avoid deliriogenic medications   Continue 24/7 supportive care at [facility]    Type 1 diabetes mellitus with hyperglycemia (HCC)  HgA1c on 04/10/24 was 7.7  Encourage CCD  AC HS blood glucose  checks  Blood glucose checks reviewed from facility records  Stable   Avoid hypoglycemia  Hypoglycemia protocol  Continue novolog 10 units TID with meals  Continue lantus 20 units SQ at lunch  Repeat HgA1c Q3 months   Labs ordered for 07/01 prior to next endocrine appointment  Follow up with Endocrinology on 07/10/24    Peripheral vascular disease, unspecified (HCC)  Stable  Continue aspirin 81 mg po daily  Continue atorvastatin 80 mg po daily        This is a 69 y.o. male seen today at [facility]. Medical history includes, not limited to, type 1 DM, orthostatic hypotension, peripheral vascular disease, atherosclerotic heart disease, hyperlipidemia, CKD stage 2, hyperlipidemia, and dementia.    Resident is seen today for a routine follow up visit. Upon entering resident's room today he is laying in bed comfortable. He is alert and oriented x 3.  Able to answer questions appropriately.  He currently denies pain.  He states he has been doing well.  He has good family support and seen often with his sister.      Reviewed resident with nursing staff.  Reviewed recent labs, vitals, and follow up appointments.           Nursing and prior provider notes reviewed on this visit. Discussed visit with PCP and nursing staff/ supervisor.      Review of Systems   Constitutional:  Negative for activity change, appetite change, fatigue and fever.   HENT:  Negative for congestion and rhinorrhea.    Eyes:  Negative for pain and visual disturbance.   Respiratory:  Negative for cough and shortness of breath.    Cardiovascular:  Negative for chest pain and leg swelling.   Gastrointestinal:  Negative for abdominal pain and constipation.   Genitourinary:  Negative for difficulty urinating and dysuria.   Musculoskeletal:  Negative for arthralgias.   Skin:  Negative for color change and rash.   Neurological:  Negative for dizziness, syncope and weakness.   Psychiatric/Behavioral:  Positive for sleep disturbance. Negative for  behavioral problems and confusion.    All other systems reviewed and are negative.         ALLERGY: Reviewed, unchanged  No Known Allergies    HISTORY:  Medical Hx: Reviewed, unchanged  Family Hx: Reviewed, unchanged  Soc Hx: Reviewed,  unchanged      Physical Exam  Vitals reviewed.   Constitutional:       General: He is not in acute distress.     Appearance: Normal appearance. He is not ill-appearing.   HENT:      Head: Normocephalic.      Right Ear: Tympanic membrane normal.      Left Ear: Tympanic membrane normal.      Nose: Nose normal. No congestion.      Mouth/Throat:      Mouth: Mucous membranes are moist.      Pharynx: Oropharynx is clear.   Eyes:      General:         Right eye: No discharge.         Left eye: No discharge.      Extraocular Movements: Extraocular movements intact.      Conjunctiva/sclera: Conjunctivae normal.      Pupils: Pupils are equal, round, and reactive to light.   Cardiovascular:      Rate and Rhythm: Normal rate and regular rhythm.      Pulses: Normal pulses.      Heart sounds: Normal heart sounds.   Pulmonary:      Effort: Pulmonary effort is normal. No respiratory distress.      Breath sounds: Normal breath sounds.   Chest:      Chest wall: No tenderness.   Abdominal:      General: Bowel sounds are normal.      Palpations: Abdomen is soft.      Tenderness: There is no abdominal tenderness.   Musculoskeletal:         General: No swelling. Normal range of motion.      Cervical back: Normal range of motion.      Right lower leg: No edema.      Left lower leg: No edema.   Skin:     General: Skin is warm and dry.   Neurological:      Mental Status: He is alert and oriented to person, place, and time. Mental status is at baseline.      Motor: No weakness.   Psychiatric:         Mood and Affect: Mood normal.         Behavior: Behavior normal.         Thought Content: Thought content normal.         Judgment: Judgment normal.            Laboratory results / Imaging reviewed: Hard copy/ies  "in medical chart:    Vitals:    06/24/24 2004   BP: 122/66   Pulse: 70   Resp: 16   Temp: 97.9 °F (36.6 °C)   SpO2: 97%       Current Medications:  All medications reviewed and updated in Nursing Home Chart    Please note: This note was completed in part utilizing a voice-recognition software may have been used in the preparation of this document. Grammatical errors, random word insertion, spelling mistakes, and incomplete sentences may be an occasional consequence of the system secondary to software limitations, ambient noise and hardware issues. Occasional wrong word or \"sound-alike\" substitutions may have occurred due to the inherent limitations of voice recognition software. At the time of dictation, efforts were made to edit, clarify and/or correct errors. Interpretation should be guided by context. Please read the chart carefully and recognize, using context, where substitutions have occurred. If you have any questions or concerns about the context, text or information contained within the body of this dictation, please contact myself, the provider, for further clarification.      TAM Joseph  6/24/2024  "

## 2024-06-25 NOTE — ASSESSMENT & PLAN NOTE
Lab Results   Component Value Date    EGFR 83 07/31/2023    EGFR 82 07/06/2023    EGFR 86 06/27/2023    CREATININE 0.99 07/31/2023    CREATININE 1.00 07/06/2023    CREATININE 0.96 06/27/2023     BMP on 04/10/24  Creatinine - 1.06  BUN- 22  eGFR - 76  Continue to avoid nephrotoxic medications  Monitor routine labs  Routine labs ordered for 07/01

## 2024-07-09 ENCOUNTER — NURSING HOME VISIT (OUTPATIENT)
Dept: GERIATRICS | Facility: OTHER | Age: 69
End: 2024-07-09
Payer: MEDICARE

## 2024-07-09 DIAGNOSIS — E78.2 MIXED HYPERLIPIDEMIA: ICD-10-CM

## 2024-07-09 DIAGNOSIS — F09 MILD COGNITIVE DISORDER: ICD-10-CM

## 2024-07-09 DIAGNOSIS — R26.2 AMBULATORY DYSFUNCTION: ICD-10-CM

## 2024-07-09 DIAGNOSIS — N18.2 CKD (CHRONIC KIDNEY DISEASE) STAGE 2, GFR 60-89 ML/MIN: ICD-10-CM

## 2024-07-09 DIAGNOSIS — Z86.718 HISTORY OF DVT (DEEP VEIN THROMBOSIS): ICD-10-CM

## 2024-07-09 DIAGNOSIS — E10.65 TYPE 1 DIABETES MELLITUS WITH HYPERGLYCEMIA (HCC): Primary | ICD-10-CM

## 2024-07-09 PROCEDURE — 99309 SBSQ NF CARE MODERATE MDM 30: CPT

## 2024-07-28 ENCOUNTER — TELEPHONE (OUTPATIENT)
Dept: OTHER | Facility: OTHER | Age: 69
End: 2024-07-28

## 2024-07-28 NOTE — TELEPHONE ENCOUNTER
"Julio C MOYA from [facility] stated, \"  He had a low blood sugar 54, I  gave him some  snacks and it  came up to 78.\"       Paged on call VIA EPIC   " Fall with Harm Risk

## 2024-07-29 VITALS
WEIGHT: 203.6 LBS | SYSTOLIC BLOOD PRESSURE: 140 MMHG | OXYGEN SATURATION: 97 % | BODY MASS INDEX: 29.21 KG/M2 | HEART RATE: 82 BPM | DIASTOLIC BLOOD PRESSURE: 76 MMHG | TEMPERATURE: 97.9 F | RESPIRATION RATE: 16 BRPM

## 2024-07-29 NOTE — ASSESSMENT & PLAN NOTE
lipid panel (07/01/24)  cholesterol-115  triglycerides-93  LDL-52  HDL-44  Being treated with a high-intensity statin d/t prior history of a NSTEMI, PVD, and CAD  Continue atorvastatin 80 mg po daily  No adverse effects noted from resident  Continue to monitor yearly

## 2024-07-29 NOTE — ASSESSMENT & PLAN NOTE
HgA1c on 07/01/24 was 7.1  Better control from 3 months ago  Encourage [facility] blood glucose checks  Blood glucose checks reviewed from facility records  Stable   Avoid hypoglycemia  Hypoglycemia protocol  Continue novolog 10 units TID with meals  Continue lantus 20 units SQ at lunch  Repeat HgA1c Q3 months   Follow up with Endocrinology on 07/10/24

## 2024-07-29 NOTE — PROGRESS NOTES
[facility]  [address] [place] [address] [address], [address], [place] [address]  [phone]    NAME: Tuna Remy  AGE: 69 y.o. SEX: male    Progress Note    Location: [facility]  POS: 32 ([facility])  Code Status: Full Code    Chief complaint / Reason for visit:  Follow-up visit    Assessment/Plan:    History of DVT (deep vein thrombosis)  Stable   Continue eliquis 2.5 mg po BID    Hyperlipidemia  lipid panel (07/01/24)  cholesterol-115  triglycerides-93  LDL-52  HDL-44  Being treated with a high-intensity statin d/t prior history of a NSTEMI, PVD, and CAD  Continue atorvastatin 80 mg po daily  No adverse effects noted from resident  Continue to monitor yearly     Ambulatory dysfunction  Maintains ambulatory status of independent with rolling walker  Encourage use of assistive device  Continue fall precautions  No recent fall reported from facility   Encourage patient to participate with activities  Provide education for patient, family, and caregivers    CKD (chronic kidney disease) stage 2, GFR 60-89 ml/min  Lab Results   Component Value Date    EGFR 83 07/31/2023    EGFR 82 07/06/2023    EGFR 86 06/27/2023    CREATININE 0.99 07/31/2023    CREATININE 1.00 07/06/2023    CREATININE 0.96 06/27/2023     BMP on 07/01/24  Creatinine - 1.04  BUN- 19  eGFR - 78  Continue to avoid nephrotoxic medications  Monitor periodically     Mild cognitive disorder  Continues to be alert and oriented x 3 on exam  Does require some assistance with ADLs at facility   May require redirection and reorientation  Encourage participation with group activities  Encourage staying mentally active with word searches, crossword puzzles, and suduko   Avoid deliriogenic medications   Continue 24/7 supportive care at [facility]    Type 1 diabetes mellitus with hyperglycemia (HCC)  HgA1c on 07/01/24 was 7.1  Better control from 3 months ago  Encourage CCD  AC HS blood glucose checks  Blood glucose checks reviewed from facility records  Stable   Avoid  hypoglycemia  Hypoglycemia protocol  Continue novolog 10 units TID with meals  Continue lantus 20 units SQ at lunch  Repeat HgA1c Q3 months   Follow up with Endocrinology on 07/10/24        This is a 69 y.o. male seen today at [facility]. Medical history includes, not limited to, type 1 DM, orthostatic hypotension, peripheral vascular disease, atherosclerotic heart disease, hyperlipidemia, CKD stage 2, hyperlipidemia, and dementia.    Resident is seen today for a routine follow up visit. Resident is alert and oriented.  No complaints at this time.  He continues to have his blood sugars monitored.  Compliant with his medication.  Denies pain.  Is seen moving around the facility well with his walker.      Reviewed resident with nursing staff.  Reviewed recent labs, vitals, and follow up appointments.           Nursing and prior provider notes reviewed on this visit. Discussed visit with PCP and nursing staff/ supervisor.      Review of Systems   Constitutional:  Negative for activity change, appetite change, fatigue and fever.   HENT:  Negative for congestion and rhinorrhea.    Eyes:  Negative for pain and visual disturbance.   Respiratory:  Negative for cough and shortness of breath.    Cardiovascular:  Negative for chest pain and leg swelling.   Gastrointestinal:  Negative for abdominal pain and constipation.   Genitourinary:  Negative for difficulty urinating and dysuria.   Musculoskeletal:  Negative for arthralgias.   Skin:  Negative for color change and rash.   Neurological:  Negative for dizziness, syncope and weakness.   Psychiatric/Behavioral:  Positive for sleep disturbance. Negative for behavioral problems and confusion.    All other systems reviewed and are negative.         ALLERGY: Reviewed, unchanged  No Known Allergies    HISTORY:  Medical Hx: Reviewed, unchanged  Family Hx: Reviewed, unchanged  Soc Hx: Reviewed,  unchanged      Physical Exam  Vitals reviewed.   Constitutional:       General:  He is not in acute distress.     Appearance: Normal appearance. He is not ill-appearing.   HENT:      Head: Normocephalic.      Right Ear: Tympanic membrane normal.      Left Ear: Tympanic membrane normal.      Nose: Nose normal. No congestion.      Mouth/Throat:      Mouth: Mucous membranes are moist.      Pharynx: Oropharynx is clear.   Eyes:      General:         Right eye: No discharge.         Left eye: No discharge.      Extraocular Movements: Extraocular movements intact.      Conjunctiva/sclera: Conjunctivae normal.      Pupils: Pupils are equal, round, and reactive to light.   Cardiovascular:      Rate and Rhythm: Normal rate and regular rhythm.      Pulses: Normal pulses.      Heart sounds: Normal heart sounds.   Pulmonary:      Effort: Pulmonary effort is normal. No respiratory distress.      Breath sounds: Normal breath sounds.   Chest:      Chest wall: No tenderness.   Abdominal:      General: Bowel sounds are normal.      Palpations: Abdomen is soft.      Tenderness: There is no abdominal tenderness.   Musculoskeletal:         General: No swelling. Normal range of motion.      Cervical back: Normal range of motion.      Right lower leg: No edema.      Left lower leg: No edema.   Skin:     General: Skin is warm and dry.   Neurological:      Mental Status: He is alert and oriented to person, place, and time. Mental status is at baseline.      Motor: No weakness.   Psychiatric:         Mood and Affect: Mood normal.         Behavior: Behavior normal.         Thought Content: Thought content normal.         Judgment: Judgment normal.            Laboratory results / Imaging reviewed: Hard copy/ies in medical chart:    Vitals:    07/29/24 1921   BP: 140/76   Pulse: 82   Resp: 16   Temp: 97.9 °F (36.6 °C)   SpO2: 97%       Current Medications:  All medications reviewed and updated in Nursing Home Chart    Please note: This note was completed in part utilizing a voice-recognition software may have been used in  "the preparation of this document. Grammatical errors, random word insertion, spelling mistakes, and incomplete sentences may be an occasional consequence of the system secondary to software limitations, ambient noise and hardware issues. Occasional wrong word or \"sound-alike\" substitutions may have occurred due to the inherent limitations of voice recognition software. At the time of dictation, efforts were made to edit, clarify and/or correct errors. Interpretation should be guided by context. Please read the chart carefully and recognize, using context, where substitutions have occurred. If you have any questions or concerns about the context, text or information contained within the body of this dictation, please contact myself, the provider, for further clarification.      TAM Joseph  7/29/2024  "

## 2024-07-29 NOTE — ASSESSMENT & PLAN NOTE
Lab Results   Component Value Date    EGFR 83 07/31/2023    EGFR 82 07/06/2023    EGFR 86 06/27/2023    CREATININE 0.99 07/31/2023    CREATININE 1.00 07/06/2023    CREATININE 0.96 06/27/2023     BMP on 07/01/24  Creatinine - 1.04  BUN- 19  eGFR - 78  Continue to avoid nephrotoxic medications  Monitor periodically

## 2024-08-08 ENCOUNTER — NURSING HOME VISIT (OUTPATIENT)
Dept: GERIATRICS | Facility: OTHER | Age: 69
End: 2024-08-08
Payer: MEDICARE

## 2024-08-08 DIAGNOSIS — F51.01 PRIMARY INSOMNIA: ICD-10-CM

## 2024-08-08 DIAGNOSIS — E78.2 MIXED HYPERLIPIDEMIA: ICD-10-CM

## 2024-08-08 DIAGNOSIS — R35.1 BENIGN PROSTATIC HYPERPLASIA WITH NOCTURIA: ICD-10-CM

## 2024-08-08 DIAGNOSIS — H53.8 BLURRY VISION, RIGHT EYE: ICD-10-CM

## 2024-08-08 DIAGNOSIS — C96.6 LANGERHAN'S CELL HISTIOCYTOSIS (HCC): ICD-10-CM

## 2024-08-08 DIAGNOSIS — N40.1 BENIGN PROSTATIC HYPERPLASIA WITH NOCTURIA: ICD-10-CM

## 2024-08-08 DIAGNOSIS — F09 MILD COGNITIVE DISORDER: ICD-10-CM

## 2024-08-08 DIAGNOSIS — Z86.718 HISTORY OF DVT (DEEP VEIN THROMBOSIS): ICD-10-CM

## 2024-08-08 DIAGNOSIS — E10.65 TYPE 1 DIABETES MELLITUS WITH HYPERGLYCEMIA (HCC): Primary | ICD-10-CM

## 2024-08-08 DIAGNOSIS — I25.10 CORONARY ARTERY DISEASE INVOLVING NATIVE HEART WITHOUT ANGINA PECTORIS, UNSPECIFIED VESSEL OR LESION TYPE: ICD-10-CM

## 2024-08-08 DIAGNOSIS — I73.9 PERIPHERAL VASCULAR DISEASE, UNSPECIFIED (HCC): ICD-10-CM

## 2024-08-08 DIAGNOSIS — F41.9 ANXIETY: ICD-10-CM

## 2024-08-08 DIAGNOSIS — I25.2 HX OF NON-ST ELEVATION MYOCARDIAL INFARCTION (NSTEMI): ICD-10-CM

## 2024-08-08 DIAGNOSIS — R26.2 AMBULATORY DYSFUNCTION: ICD-10-CM

## 2024-08-08 PROCEDURE — 99309 SBSQ NF CARE MODERATE MDM 30: CPT | Performed by: STUDENT IN AN ORGANIZED HEALTH CARE EDUCATION/TRAINING PROGRAM

## 2024-08-09 NOTE — ASSESSMENT & PLAN NOTE
Noted hx in chart  Patient Aox3 and seems to be a fair historian, mentation seems stable  May require redirection and reorientation  Encourage participation with group activities  Encourage staying mentally active with word searches, crossword puzzles, and suduko   Avoid deliriogenic medications   Continue 24/7 supportive care at [facility]

## 2024-08-09 NOTE — PROGRESS NOTES
[facility]  Facility: [facility]    PROGRESS NOTE  Nursing Home Place of Service: nursing home place of service: POS 32 Unskilled- No Part A Coverage    NAME: Tuan Remy  : 1955 AGE: 69 y.o. SEX: male MRN: [id number]  DATE OF ENCOUNTER: 2024    Assessment and Plan     Problem List Items Addressed This Visit       Ambulatory dysfunction     Encourage use of assistive device  Uses rolling walker  Continue fall precautions  Has been cleared by therapy to move around the building  Encourage patient to participate with activities  Provide education for patient, family, and caregivers         Blurry vision, right eye     Patient with markedly decreased vision in his right eye, chronic issue, secondary to his underlying diabetes. Stable.  He does take latanoprost ophthalmic solution to the right eye at bedtime and does follow with ophthalmology at [facility]          Hyperlipidemia     lipid panel (23)  cholesterol-109  triglycerides-61  LDL-53  HDL-44  Being treated with a high-intensity statin d/t prior history of a NSTEMI, PVD, and CAD  Continue atorvastatin 80 mg po daily, appears to be tolerating well  Goal LDL <70 per Cardiology         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HCC)     Following Heme/Onc at [facility], recent visit 2024  Was treated in  with 2-CDA  Completed 2 years of zoledronic acid  Considered stable per Heme/Onc, no evidence of recurrent disease  No apparent acute/associated symptoms presently         Peripheral vascular disease, unspecified (HCC)     Noted history  No apparent acute/associated concerns, seems stable  Continue aspirin, statin         Type 1 diabetes mellitus with hyperglycemia (HCC) - Primary       Lab Results   Component Value Date    HGBA1C 7.9 (H) 2023     Monitor A1c routinely - improved recently 7.1  Following Endocrinology, recent visit 2024  Monitor glucose - somewhat variable, but overall elevated lately, ranging 100s-300s  "generally  Avoid hypoglycemia  Continue insulin with sliding scale  Continue novolog 12 units + sliding scale with meals  Continue lantus 23 u daily (recently increased)  Adjust/titrate regimen as appropriate if sugars persistently elevated above goal  Follow up with Endocrine         Anxiety     Noted hx  Mood stable, good spirits  Monitor off meds  Supportive care         Coronary artery disease without angina pectoris     Lexiscan stress test:  1. Infarct involving the left circumflex territory/lateral wall with moderate  sonam-infarct ischemia.  2. Normal left ventricle systolic function with ejection fraction of 55%.     Previous Cardiology visit 1/26/23:  \"NSTEMI as noted previously. Currently doing well.  He will continue aggressive risk modification.  He will continue his current medications.  No further cardiac testing is indicated at this time. \"     Recent Cardiology visit 2/23/24:  To continue aspirin, statin. Avoided beta blocker/ACE-I with hx of orthostatic hypotension.        No acute cardiac complaints, seems stable  Continue aspirin, statin         History of DVT (deep vein thrombosis)     Follows Heme/Onc at [facility]  Hx of unprovoked VTE  To continue Eliquis for AC (lifelong per Hematology recommendation)         Hx of non-ST elevation myocardial infarction (NSTEMI)     See plan under CAD          Mild cognitive disorder     Noted hx in chart  Patient Aox3 and seems to be a fair historian, mentation seems stable  May require redirection and reorientation  Encourage participation with group activities  Encourage staying mentally active with word searches, crossword puzzles, and suduko   Avoid deliriogenic medications   Continue 24/7 supportive care at LTC         Primary insomnia     Stable  Continue melatonin  Supportive care, sleep hygiene         Benign prostatic hyperplasia with nocturia     BPH with LUTS  Following Urology, recent visit in Jan 2024  Started on flomax 0.4mg in Jan 2024  Monitor PSA " routinely - WNL though some mild uptrend noted, continue to monitor  Seems symptomatically stable                  Chief Complaint     Follow up 60 day (90 day)    History of Present Illness     Tuan Remy is a 69 y.o. male who was seen today for follow up. PMH including type 1 DM, orthostatic hypotension, peripheral vascular disease, atherosclerotic heart disease, hyperlipidemia, LCH, hyperlipidemia        Patient seen and examined in room  Others present: none  Patient seated in chair reading in room  Appears comfortable, awake, alert, oriented to situation, able to converse appropriately  Polite, Aox3, in good spirits, appearing similar to my prior visit in terms of mentation and mood. Notes he feels well overall with no acute concerns at present. No acute pain anywhere. Good appetite, no acute swallowing concerns. Reports generally daily regular BM, no abd pain/N/V. Breathing fine, no orthopnea. No acute cardiopulmonary, abdominal, or urinary symptoms; see ROS for more details. He does not feel sick or confused recently. Sleeping generally OK, some disturbance at times from noise outside room or roommate's TV. He keeps active and enjoys reading.  Gets around mainly with walker and denies recent falls.  No further questions or acute concerns identified.  No acute concerns per NP or nursing.     Lab Review:   9/14/23: A1c 7.5 (improved from 7.9 in July 2023)  12/28/23: PSA WNL (2.45)  1/29/24: CBC with Hb 11.8; CMP generally non-actionable, glu 50, eGFR 75; last TSH WNL from April 2023  4/10/24: CBC with Hb 11.4; BMP generally stable/non-actionable, eGFR 76; A1c 7.7  6/24/24: PSA WNL (3.42)  7/1/24: CBC with Hb 12.0; BMP generally stable/non-actionable, eGFR 78; LDL 52; TSH WNL; A1c 7.1  7/24/24: PSA 3.52 (WNL)        Echo Oct 2022: EF 60-65, normal diastolic function       The following portions of the patient's history were reviewed and updated as appropriate: allergies, current medications, past family  history, past medical history, past social history, past surgical history and problem list.    Review of Systems     Review of Systems   Constitutional:  Negative for appetite change, chills, diaphoresis and fever.   HENT:  Negative for drooling, ear pain, hearing loss, rhinorrhea, sore throat and trouble swallowing.    Eyes:  Negative for pain, discharge, redness, itching and visual disturbance.   Respiratory:  Negative for cough, chest tightness, shortness of breath and wheezing.    Cardiovascular:  Negative for chest pain, palpitations and leg swelling (mild, baseline).   Gastrointestinal:  Negative for abdominal pain, blood in stool, constipation, diarrhea and vomiting.   Genitourinary:  Negative for difficulty urinating, dysuria, flank pain and hematuria.   Musculoskeletal:  Positive for gait problem. Negative for arthralgias, back pain and neck pain.   Skin:  Negative for color change.   Neurological:  Negative for dizziness, tremors, facial asymmetry, speech difficulty, weakness and headaches.   Psychiatric/Behavioral:  Negative for agitation, behavioral problems and confusion. The patient is not nervous/anxious and is not hyperactive.        Active Problem List     Patient Active Problem List   Diagnosis    Ambulatory dysfunction    Blurry vision, right eye    Hyperlipidemia    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HCC)    Orthostatic hypotension    Peripheral vascular disease, unspecified (HCC)    Type 1 diabetes mellitus with hyperglycemia (HCC)    Sinus tachycardia    Anxiety    Reactive depression    Coronary artery disease without angina pectoris    Acute kidney injury superimposed on chronic kidney disease  (HCC)    History of DVT (deep vein thrombosis)    Hyperlipidemia due to type 1 diabetes mellitus  (HCC)    Other constipation    CKD (chronic kidney disease) stage 2, GFR 60-89 ml/min    Urinary frequency    Hx of non-ST elevation myocardial infarction (NSTEMI)    Mild cognitive disorder    Primary  insomnia    Benign prostatic hyperplasia with nocturia    Advance care planning       Objective     Vital Signs:     BP: 138/72 mmHg  8/2/2024 18:10 Temp:98.4 °F  7/31/2024 08:59 Pulse:83 bpm  8/2/2024 09:02 Weight:194.3 Lbs  8/1/2024 17:42   Resp:20 Breaths/min  7/31/2024 09:09 BS:150 mg/dL  8/8/2024 20:38 O2:97 %  7/31/2024 09:09 Pain:0  8/8/2024 15:42       Physical Exam  Vitals reviewed.   Constitutional:       General: He is not in acute distress.     Appearance: He is not toxic-appearing or diaphoretic.   HENT:      Head: Normocephalic and atraumatic.      Right Ear: External ear normal.      Left Ear: External ear normal.      Nose: Nose normal. No rhinorrhea.      Mouth/Throat:      Mouth: Mucous membranes are moist.      Pharynx: Oropharynx is clear. No oropharyngeal exudate or posterior oropharyngeal erythema.   Eyes:      General: No scleral icterus.        Right eye: No discharge.         Left eye: No discharge.      Extraocular Movements: Extraocular movements intact.      Conjunctiva/sclera: Conjunctivae normal.      Pupils: Pupils are equal, round, and reactive to light.   Cardiovascular:      Rate and Rhythm: Normal rate and regular rhythm.   Pulmonary:      Effort: Pulmonary effort is normal. No respiratory distress.      Breath sounds: No wheezing or rales.   Abdominal:      General: Bowel sounds are normal.      Palpations: Abdomen is soft.      Tenderness: There is no abdominal tenderness. There is no guarding.   Musculoskeletal:         General: No tenderness.      Cervical back: No rigidity.      Comments: Trace bilateral LE edema (chronic/stable per patient)   Skin:     General: Skin is warm and dry.      Coloration: Skin is not jaundiced.   Neurological:      General: No focal deficit present.      Mental Status: He is alert and oriented to person, place, and time. Mental status is at baseline.   Psychiatric:         Mood and Affect: Mood normal.         Behavior: Behavior normal.          Thought Content: Thought content normal.         Judgment: Judgment normal.         Pertinent Laboratory/Diagnostic Studies:  Laboratory and Imaging studies reviewed. Full report in the paper chart.     Current Medications   Medications reviewed and updated in facility chart.      -Total time spent on this encounter today including documentation and workup review, face to face time, history and exam, and documentation/orders was approximately 35 minutes.  -This note will be copied to [facility] EMR where vitals and medication orders are placed.    Ryan Chester D.O.  Geriatric Medicine  8/8/2024 11:43 PM

## 2024-08-09 NOTE — ASSESSMENT & PLAN NOTE
Patient with markedly decreased vision in his right eye, chronic issue, secondary to his underlying diabetes. Stable.  He does take latanoprost ophthalmic solution to the right eye at bedtime and does follow with ophthalmology at [facility]

## 2024-08-09 NOTE — ASSESSMENT & PLAN NOTE
BPH with LUTS  Following Urology, recent visit in Jan 2024  Started on flomax 0.4mg in Jan 2024  Monitor PSA routinely - WNL though some mild uptrend noted, continue to monitor  Seems symptomatically stable

## 2024-08-09 NOTE — ASSESSMENT & PLAN NOTE
Lab Results   Component Value Date    HGBA1C 7.9 (H) 07/06/2023     Monitor A1c routinely - improved recently 7.1  Following Endocrinology, recent visit July 2024  Monitor glucose - somewhat variable, but overall elevated lately, ranging 100s-300s generally  Avoid hypoglycemia  Continue insulin with sliding scale  Continue novolog 12 units + sliding scale with meals  Continue lantus 23 u daily (recently increased)  Adjust/titrate regimen as appropriate if sugars persistently elevated above goal  Follow up with Endocrine

## 2024-08-09 NOTE — ASSESSMENT & PLAN NOTE
Following Heme/Onc at [facility], recent visit Feb 2024  Was treated in 2013 with 2-CDA  Completed 2 years of zoledronic acid  Considered stable per Heme/Onc, no evidence of recurrent disease  No apparent acute/associated symptoms presently

## 2024-08-25 ENCOUNTER — TELEPHONE (OUTPATIENT)
Dept: OTHER | Facility: OTHER | Age: 69
End: 2024-08-25

## 2024-08-26 NOTE — TELEPHONE ENCOUNTER
Nursing home or Independent living name:  [facility]     Who is calling: Teresa     Callback number: [phone]    Symptoms:  BGL 69, gave a snack and dinner BLG now 135   Per parameters     Message sent to on call provider; verified read receipt of message.

## 2024-09-03 ENCOUNTER — TELEPHONE (OUTPATIENT)
Dept: OTHER | Facility: OTHER | Age: 69
End: 2024-09-03

## 2024-09-03 ENCOUNTER — NURSING HOME VISIT (OUTPATIENT)
Dept: GERIATRICS | Facility: OTHER | Age: 69
End: 2024-09-03
Payer: MEDICARE

## 2024-09-03 DIAGNOSIS — N18.2 CKD (CHRONIC KIDNEY DISEASE) STAGE 2, GFR 60-89 ML/MIN: ICD-10-CM

## 2024-09-03 DIAGNOSIS — I95.1 ORTHOSTATIC HYPOTENSION: ICD-10-CM

## 2024-09-03 DIAGNOSIS — I25.10 CORONARY ARTERY DISEASE INVOLVING NATIVE HEART WITHOUT ANGINA PECTORIS, UNSPECIFIED VESSEL OR LESION TYPE: ICD-10-CM
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
## 2024-09-03 DIAGNOSIS — F09 MILD COGNITIVE DISORDER: ICD-10-CM

## 2024-09-03 DIAGNOSIS — N40.1 BENIGN PROSTATIC HYPERPLASIA WITH NOCTURIA: ICD-10-CM

## 2024-09-03 DIAGNOSIS — R26.2 AMBULATORY DYSFUNCTION: ICD-10-CM

## 2024-09-03 DIAGNOSIS — R35.1 BENIGN PROSTATIC HYPERPLASIA WITH NOCTURIA: ICD-10-CM

## 2024-09-03 DIAGNOSIS — F51.01 PRIMARY INSOMNIA: ICD-10-CM

## 2024-09-03 DIAGNOSIS — E10.65 TYPE 1 DIABETES MELLITUS WITH HYPERGLYCEMIA (HCC): Primary | ICD-10-CM

## 2024-09-03 PROCEDURE — 99309 SBSQ NF CARE MODERATE MDM 30: CPT

## 2024-09-03 NOTE — TELEPHONE ENCOUNTER
Nursing home or Independent living name:  If its not nursing home or Independent living, do they see PCP in Network: [facility]  Who is calling: NITA Conner  Callback number: [phone]  Symptoms: Blood glucose 52 after fall  Did you page oncall or place in triage hub: Sent ESC to provider

## 2024-09-04 ENCOUNTER — NURSING HOME VISIT (OUTPATIENT)
Dept: GERIATRICS | Facility: OTHER | Age: 69
End: 2024-09-04
Payer: MEDICARE

## 2024-09-04 DIAGNOSIS — R41.0 ACUTE CONFUSION: Primary | ICD-10-CM

## 2024-09-04 DIAGNOSIS — F09 MILD COGNITIVE DISORDER: ICD-10-CM

## 2024-09-04 DIAGNOSIS — I95.1 ORTHOSTATIC HYPOTENSION: ICD-10-CM

## 2024-09-04 PROCEDURE — 99308 SBSQ NF CARE LOW MDM 20: CPT

## 2024-09-05 ENCOUNTER — NURSING HOME VISIT (OUTPATIENT)
Dept: GERIATRICS | Facility: OTHER | Age: 69
End: 2024-09-05
Payer: MEDICARE

## 2024-09-05 DIAGNOSIS — E10.65 TYPE 1 DIABETES MELLITUS WITH HYPERGLYCEMIA (HCC): ICD-10-CM

## 2024-09-05 DIAGNOSIS — R35.1 BENIGN PROSTATIC HYPERPLASIA WITH NOCTURIA: ICD-10-CM

## 2024-09-05 DIAGNOSIS — R41.0 ACUTE CONFUSION: Primary | ICD-10-CM

## 2024-09-05 DIAGNOSIS — R26.2 AMBULATORY DYSFUNCTION: ICD-10-CM

## 2024-09-05 DIAGNOSIS — N40.1 BENIGN PROSTATIC HYPERPLASIA WITH NOCTURIA: ICD-10-CM

## 2024-09-05 PROCEDURE — 99308 SBSQ NF CARE LOW MDM 20: CPT

## 2024-09-08 ENCOUNTER — TELEPHONE (OUTPATIENT)
Dept: OTHER | Facility: OTHER | Age: 69
End: 2024-09-08

## 2024-09-10 VITALS
SYSTOLIC BLOOD PRESSURE: 118 MMHG | TEMPERATURE: 98.2 F | HEART RATE: 80 BPM | WEIGHT: 196.2 LBS | DIASTOLIC BLOOD PRESSURE: 64 MMHG | BODY MASS INDEX: 28.15 KG/M2

## 2024-09-10 VITALS
DIASTOLIC BLOOD PRESSURE: 66 MMHG | SYSTOLIC BLOOD PRESSURE: 100 MMHG | HEART RATE: 78 BPM | BODY MASS INDEX: 28.15 KG/M2 | WEIGHT: 196.2 LBS | RESPIRATION RATE: 18 BRPM | TEMPERATURE: 98.2 F

## 2024-09-10 VITALS
WEIGHT: 196.2 LBS | HEART RATE: 97 BPM | BODY MASS INDEX: 28.15 KG/M2 | SYSTOLIC BLOOD PRESSURE: 103 MMHG | TEMPERATURE: 97.8 F | DIASTOLIC BLOOD PRESSURE: 44 MMHG

## 2024-09-10 PROBLEM — R41.0 ACUTE CONFUSION: Status: ACTIVE | Noted: 2024-09-10
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
## 2024-09-10 PROBLEM — R29.6 FALLS: Status: ACTIVE | Noted: 2024-09-10

## 2024-09-10 NOTE — PROGRESS NOTES
[address], [address], [place] [address]  [phone]    NAME: Tuan Remy  AGE: 69 y.o. SEX: male    Progress Note    Location: [facility]  POS: 32 ([facility])  Code Status: Full Code    Chief complaint / Reason for visit:  Follow-up visit/confusion     Assessment/Plan:    Orthostatic hypotension  Recent fall  Possible contributing factors of orthostatic hypotension vs hypoglycemia  Recent orthostatic blood pressure checks showing drop in blood pressure from laying to sitting to standing  History of taking midodrine 5 mg po TID  May need to consider adding back to medication regimen  Encourage slow positional changes  Encourage adequate fluid intake    Mild cognitive disorder  Acute confusion displayed today  Discussion with sister to send Sean out to hospital for further evaluation regarding acute confusion  Avoid deliriogenic medications   Continue 24/7 supportive care at [facility]    Acute confusion  Acute confusion post-fall  Alert and oriented x 1  Thought process and comments display confusion  Referencing people making comments of needing him to stay in bed or in the room?   Unsure if acute confusion is related to hitting head during fall?    Resident cannot recall hitting head  Transfer to ED for further evaluation           This is a 69 y.o. male seen today at [facility]. Medical history includes, not limited to, type 1 DM, orthostatic hypotension, peripheral vascular disease, atherosclerotic heart disease, hyperlipidemia, CKD stage 2, hyperlipidemia, and dementia.    Resident is seen for a follow up regarding acute confusion.  He continues to be alert and oriented x 1.  Displays acute confusion on assessment today.  Spoke with sister, Archana, regarding confusion, who is present at time of assessment.      Reviewed resident with nursing staff.  Decision discussed with sister, Archana, to send Tuan to the hospital for further evaluation.  Resident is  chronically on eliquis, unsure if resident hit head during fall on 09/03.               Nursing and prior provider notes reviewed on this visit. Discussed visit with PCP and nursing staff/ supervisor.      Review of Systems   Constitutional:  Negative for activity change, appetite change, fatigue and fever.   HENT:  Negative for congestion and rhinorrhea.    Eyes:  Negative for pain and visual disturbance.   Respiratory:  Negative for cough and shortness of breath.    Cardiovascular:  Negative for chest pain and leg swelling.   Gastrointestinal:  Negative for abdominal pain and constipation.   Genitourinary:  Negative for difficulty urinating and dysuria.   Musculoskeletal:  Negative for arthralgias.   Skin:  Negative for color change and rash.   Neurological:  Negative for dizziness, syncope and weakness.   Psychiatric/Behavioral:  Positive for sleep disturbance. Negative for behavioral problems and confusion.    All other systems reviewed and are negative.         ALLERGY: Reviewed, unchanged  No Known Allergies    HISTORY:  Medical Hx: Reviewed, unchanged  Family Hx: Reviewed, unchanged  Soc Hx: Reviewed,  unchanged      Physical Exam  Vitals reviewed.   Constitutional:       General: He is not in acute distress.     Appearance: Normal appearance. He is not ill-appearing.   HENT:      Head: Normocephalic.      Right Ear: Tympanic membrane normal.      Left Ear: Tympanic membrane normal.      Nose: Nose normal. No congestion.      Mouth/Throat:      Mouth: Mucous membranes are moist.      Pharynx: Oropharynx is clear.   Eyes:      General:         Right eye: No discharge.         Left eye: No discharge.      Extraocular Movements: Extraocular movements intact.      Conjunctiva/sclera: Conjunctivae normal.      Pupils: Pupils are equal, round, and reactive to light.   Cardiovascular:      Rate and Rhythm: Normal rate and regular rhythm.      Pulses: Normal pulses.      Heart sounds: Normal heart sounds.  "  Pulmonary:      Effort: Pulmonary effort is normal. No respiratory distress.      Breath sounds: Normal breath sounds.   Chest:      Chest wall: No tenderness.   Abdominal:      General: Bowel sounds are normal.      Palpations: Abdomen is soft.      Tenderness: There is no abdominal tenderness.   Musculoskeletal:         General: No swelling. Normal range of motion.      Cervical back: Normal range of motion.      Right lower leg: No edema.      Left lower leg: No edema.   Skin:     General: Skin is warm and dry.   Neurological:      Mental Status: He is alert.      Motor: No weakness.      Comments: Acute confusion   Psychiatric:         Mood and Affect: Mood normal.         Behavior: Behavior normal.         Judgment: Judgment normal.            Laboratory results / Imaging reviewed: Hard copy/ies in medical chart:    Vitals:    09/04/24 1138   BP: (!) 103/44   Pulse: 97   Temp: 97.8 °F (36.6 °C)       Current Medications:  All medications reviewed and updated in Nursing Home Chart    Please note: This note was completed in part utilizing a voice-recognition software may have been used in the preparation of this document. Grammatical errors, random word insertion, spelling mistakes, and incomplete sentences may be an occasional consequence of the system secondary to software limitations, ambient noise and hardware issues. Occasional wrong word or \"sound-alike\" substitutions may have occurred due to the inherent limitations of voice recognition software. At the time of dictation, efforts were made to edit, clarify and/or correct errors. Interpretation should be guided by context. Please read the chart carefully and recognize, using context, where substitutions have occurred. If you have any questions or concerns about the context, text or information contained within the body of this dictation, please contact myself, the provider, for further clarification.      TAM Joseph  9/10/2024  "

## 2024-09-10 NOTE — ASSESSMENT & PLAN NOTE
PSA (07/24/24)  3.52  Continue tamsulosin 0.4 mg  Was supposed to go to follow up urology appointment on 09/03, had fall in early morning, appointment needs to be rescheduled  CT abdomen pelvis completed on 09/04 during ED evaluation  Small enhancing nodule at the bladder base in the region of the trigone unchanged from prior

## 2024-09-10 NOTE — ASSESSMENT & PLAN NOTE
PSA (07/24/24)  3.52  Continue tamsulosin 0.4 mg  Was supposed to go to follow up urology appointment today  Rescheduled due to fall in early morning

## 2024-09-10 NOTE — PROGRESS NOTES
[facility]  [address] [place] [address], [address], [place] [address]  [phone]    NAME: Tuan Remy  AGE: 69 y.o. SEX: male    Progress Note    Location: [facility]  POS: 32 ([facility])  Code Status: Full Code    Chief complaint / Reason for visit:  Follow-up visit/fall overnight    Assessment/Plan:    Orthostatic hypotension  Recent fall overnight  Possible contributing factors of orthostatic hypotension vs hypoglycemia  Obtain orthostatic blood pressures x 3 days  History of taking midodrine 5 mg po TID  May need to consider adding back to medication regimen  Encourage slow positional changes  Encourage adequate fluid intake    Coronary artery disease without angina pectoris  Recent cardiology appointment on 02/23/24  Continue aspirin 81 mg  Continue atorvastatin 80 mg  Avoid beta blocker/ACE-I with history of orthostatic hypotension  Denies acute cardiac symptoms     Type 1 diabetes mellitus with hyperglycemia (HCC)  HgA1c on 07/01/24 was 7.1  Encourage CCD  AC HS blood glucose checks  Blood glucose checks reviewed from facility records  Stable   Avoid hypoglycemia  Recent hypoglycemia episodes overnight  Possible contributing factor to recent fall  Hypoglycemia protocol  Continue novolog 10 units TID with meals  Continue lantus 20 units SQ at lunch  Repeat HgA1c Q3 months   Follow up with Endocrinology    Mild cognitive disorder  Appears confused on assessment today, is usually alert and oriented x 3  Does require some assistance with ADLs at facility   May require redirection and reorientation  Encourage participation with group activities  Encourage staying mentally active with word searches, crossword puzzles, and suduko   Avoid deliriogenic medications   Continue 24/7 supportive care at [facility]    CKD (chronic kidney disease) stage 2, GFR 60-89 ml/min  Lab Results   Component Value Date    EGFR 56 (L) 09/04/2024    EGFR 78 07/01/2024    EGFR 76 04/10/2024    CREATININE 1.37 (H) 09/04/2024     CREATININE 1.04 07/01/2024    CREATININE 1.06 04/10/2024     BMP on 07/01/24  Creatinine - 1.04  BUN- 19  eGFR - 78  Continue to avoid nephrotoxic medications  Monitor periodically     Ambulatory dysfunction  Maintains ambulatory status of independent with rolling walker  Encourage use of assistive device  Continue fall precautions   Encourage patient to participate with activities  Provide education for patient, family, and caregivers    Primary insomnia  Avoid sedative hypnotics such as benzodiazepines and benadryl  Encourage staying awake during the day  Encourage daytime activity, morning exercise  Decrease or eliminate day time naps  Avoid caffiene, alcohol especially during late afternoon and evening hours  Establish a night time routine  Recent change of rooms to help with sleeping at night  Continue melatonin 3 mg po daily at bedtime      Benign prostatic hyperplasia with nocturia  PSA (07/24/24)  3.52  Continue tamsulosin 0.4 mg  Was supposed to go to follow up urology appointment today  Rescheduled due to fall in early morning      Falls  Fall overnight  Nursing found resident on the floor near the closets  Resident unsure if he hit his head  No signs of swelling, bruising, bleeding  Reports of hypoglycemia at time of fall  Was given snack/juice with BG going up after  Blood pressures have been low  Resident reports sometimes feeling dizzy when sitting up in bed/standing  Encouraged slow positional changes  History of orthostatic hypotension  Re-evaluation by physical therapy  Fall precautions        This is a 69 y.o. male seen today at [facility]. Medical history includes, not limited to, type 1 DM, orthostatic hypotension, peripheral vascular disease, atherosclerotic heart disease, hyperlipidemia, CKD stage 2, hyperlipidemia, and dementia.    Resident is seen today for a routine follow up visit. Resident who is usually alert and oriented x 3, today shows some acute confusion.  Possibly related  "to fall that occurred overnight.  He states that he was trying to get up and could not find the call bell to ring for help.  He is unsure if he hit his head.  Denies pain.  States that he has been instructed to stay in bed by someone with the nursing staff.  Refers to them as, \"the \".      Reviewed resident with nursing staff.  Reviewed recent labs, vitals.  Reviewed statements made by the resident with the nursing staff for clarification.  They report he has had some confusion.  Reviewed recent blood sugars with an occasional sugar being low in the morning.           Nursing and prior provider notes reviewed on this visit. Discussed visit with PCP and nursing staff/ supervisor.      Review of Systems   Constitutional:  Negative for activity change, appetite change, fatigue and fever.   HENT:  Negative for congestion and rhinorrhea.    Eyes:  Negative for pain and visual disturbance.   Respiratory:  Negative for cough and shortness of breath.    Cardiovascular:  Negative for chest pain and leg swelling.   Gastrointestinal:  Negative for abdominal pain and constipation.   Genitourinary:  Negative for difficulty urinating and dysuria.   Musculoskeletal:  Negative for arthralgias.   Skin:  Negative for color change and rash.   Neurological:  Negative for dizziness, syncope and weakness.   Psychiatric/Behavioral:  Positive for sleep disturbance. Negative for behavioral problems and confusion.    All other systems reviewed and are negative.         ALLERGY: Reviewed, unchanged  No Known Allergies    HISTORY:  Medical Hx: Reviewed, unchanged  Family Hx: Reviewed, unchanged  Soc Hx: Reviewed,  unchanged      Physical Exam  Vitals reviewed.   Constitutional:       General: He is not in acute distress.     Appearance: Normal appearance. He is not ill-appearing.   HENT:      Head: Normocephalic.      Right Ear: Tympanic membrane normal.      Left Ear: Tympanic membrane normal.      Nose: Nose normal. No congestion.      " "Mouth/Throat:      Mouth: Mucous membranes are moist.      Pharynx: Oropharynx is clear.   Eyes:      General:         Right eye: No discharge.         Left eye: No discharge.      Extraocular Movements: Extraocular movements intact.      Conjunctiva/sclera: Conjunctivae normal.      Pupils: Pupils are equal, round, and reactive to light.   Cardiovascular:      Rate and Rhythm: Normal rate and regular rhythm.      Pulses: Normal pulses.      Heart sounds: Normal heart sounds.   Pulmonary:      Effort: Pulmonary effort is normal. No respiratory distress.      Breath sounds: Normal breath sounds.   Chest:      Chest wall: No tenderness.   Abdominal:      General: Bowel sounds are normal.      Palpations: Abdomen is soft.      Tenderness: There is no abdominal tenderness.   Musculoskeletal:         General: No swelling. Normal range of motion.      Cervical back: Normal range of motion.      Right lower leg: No edema.      Left lower leg: No edema.   Skin:     General: Skin is warm and dry.   Neurological:      Mental Status: He is alert.      Motor: No weakness.      Comments: Acute confusion   Psychiatric:         Mood and Affect: Mood normal.         Behavior: Behavior normal.         Judgment: Judgment normal.            Laboratory results / Imaging reviewed: Hard copy/ies in medical chart:    Vitals:    09/03/24 1057   BP: 118/64   Pulse: 80   Temp: 98.2 °F (36.8 °C)       Current Medications:  All medications reviewed and updated in Nursing Home Chart    Please note: This note was completed in part utilizing a voice-recognition software may have been used in the preparation of this document. Grammatical errors, random word insertion, spelling mistakes, and incomplete sentences may be an occasional consequence of the system secondary to software limitations, ambient noise and hardware issues. Occasional wrong word or \"sound-alike\" substitutions may have occurred due to the inherent limitations of voice recognition " software. At the time of dictation, efforts were made to edit, clarify and/or correct errors. Interpretation should be guided by context. Please read the chart carefully and recognize, using context, where substitutions have occurred. If you have any questions or concerns about the context, text or information contained within the body of this dictation, please contact myself, the provider, for further clarification.      TAM Joseph  9/10/2024   [Depression] : depression [Anxiety] : anxiety [Nl] : Genitourinary

## 2024-09-10 NOTE — ASSESSMENT & PLAN NOTE
Maintains ambulatory status of independent with rolling walker  Therapy to re-evaluate post-fall on 09/03  Encourage use of assistive device  Continue fall precautions   Encourage patient to participate with activities  Provide education for patient, family, and caregivers

## 2024-09-10 NOTE — ASSESSMENT & PLAN NOTE
Recent fall  Possible contributing factors of orthostatic hypotension vs hypoglycemia  Recent orthostatic blood pressure checks showing drop in blood pressure from laying to sitting to standing  History of taking midodrine 5 mg po TID  May need to consider adding back to medication regimen  Encourage slow positional changes  Encourage adequate fluid intake

## 2024-09-10 NOTE — ASSESSMENT & PLAN NOTE
Recent fall overnight  Possible contributing factors of orthostatic hypotension vs hypoglycemia  Obtain orthostatic blood pressures x 3 days  History of taking midodrine 5 mg po TID  May need to consider adding back to medication regimen  Encourage slow positional changes  Encourage adequate fluid intake

## 2024-09-10 NOTE — ASSESSMENT & PLAN NOTE
HgA1c on 07/01/24 was 7.1  Encourage [facility] blood glucose checks  Blood glucose checks reviewed from facility records  Stable   Avoid hypoglycemia  Recent hypoglycemia episodes overnight  Possible contributing factor to recent fall  Hypoglycemia protocol  Continue novolog 10 units TID with meals  Continue lantus 20 units SQ at lunch  Repeat HgA1c Q3 months   Follow up with Endocrinology

## 2024-09-10 NOTE — ASSESSMENT & PLAN NOTE
Acute confusion displayed today  Discussion with sister to send Sean out to hospital for further evaluation regarding acute confusion  Avoid deliriogenic medications   Continue 24/7 supportive care at [facility]

## 2024-09-10 NOTE — ASSESSMENT & PLAN NOTE
Acute confusion post-fall  Alert and oriented x 1  ED visit yesterday (09/04/24)  CT head completed  No acute intracranial findings: no large acute transcortical infarct or intracranial hemorrhage  Mild white matter changes, which are nonspecific commonly seen with chronic microvascular ischemic change  UA negative  No signs of infection, WBC was WNL  Continue with frequent reorientation, redirection  Possible acute delirium?  Encourage to be out of room, participating in group activities   Monitor vital signs  Encourage increased po hydration

## 2024-09-10 NOTE — ASSESSMENT & PLAN NOTE
Lab Results   Component Value Date    EGFR 56 (L) 09/04/2024    EGFR 78 07/01/2024    EGFR 76 04/10/2024    CREATININE 1.37 (H) 09/04/2024    CREATININE 1.04 07/01/2024    CREATININE 1.06 04/10/2024     BMP on 07/01/24  Creatinine - 1.04  BUN- 19  eGFR - 78  Continue to avoid nephrotoxic medications  Monitor periodically

## 2024-09-10 NOTE — ASSESSMENT & PLAN NOTE
Appears confused on assessment today, is usually alert and oriented x 3  Does require some assistance with ADLs at facility   May require redirection and reorientation  Encourage participation with group activities  Encourage staying mentally active with word searches, crossword puzzles, and suduko   Avoid deliriogenic medications   Continue 24/7 supportive care at LTC

## 2024-09-10 NOTE — ASSESSMENT & PLAN NOTE
Avoid sedative hypnotics such as benzodiazepines and benadryl  Encourage staying awake during the day  Encourage daytime activity, morning exercise  Decrease or eliminate day time naps  Avoid caffiene, alcohol especially during late afternoon and evening hours  Establish a night time routine  Recent change of rooms to help with sleeping at night  Continue melatonin 3 mg po daily at bedtime

## 2024-09-10 NOTE — PROGRESS NOTES
[address], [address], [place] [address]  [phone]    NAME: Tuan Remy  AGE: 69 y.o. SEX: male    Progress Note    Location: [facility]  POS: 32 ([facility])  Code Status: Full Code    Chief complaint / Reason for visit:  Follow-up visit/hospital return    Assessment/Plan:    Acute confusion  Acute confusion post-fall  Alert and oriented x 1  ED visit yesterday (09/04/24)  CT head completed  No acute intracranial findings: no large acute transcortical infarct or intracranial hemorrhage  Mild white matter changes, which are nonspecific commonly seen with chronic microvascular ischemic change  UA negative  No signs of infection, WBC was WNL  Continue with frequent reorientation, redirection  Possible acute delirium?  Encourage to be out of room, participating in group activities   Monitor vital signs  Encourage increased po hydration     Benign prostatic hyperplasia with nocturia  PSA (07/24/24)  3.52  Continue tamsulosin 0.4 mg  Was supposed to go to follow up urology appointment on 09/03, had fall in early morning, appointment needs to be rescheduled  CT abdomen pelvis completed on 09/04 during ED evaluation  Small enhancing nodule at the bladder base in the region of the trigone unchanged from prior      Ambulatory dysfunction  Maintains ambulatory status of independent with rolling walker  Therapy to re-evaluate post-fall on 09/03  Encourage use of assistive device  Continue fall precautions   Encourage patient to participate with activities  Provide education for patient, family, and caregivers    Type 1 diabetes mellitus with hyperglycemia (HCC)  HgA1c on 07/01/24 was 7.1  Encourage CCD  AC HS blood glucose checks  Blood glucose checks reviewed from facility records  Stable   Avoid hypoglycemia  Recent hypoglycemia episodes overnight  Possible contributing factor to recent fall  Hypoglycemia protocol  Continue novolog 10 units TID with meals  Continue lantus 20 units  "SQ at lunch  Repeat HgA1c Q3 months   Follow up with Endocrinology          This is a 69 y.o. male seen today at [facility]. Medical history includes, not limited to, type 1 DM, orthostatic hypotension, peripheral vascular disease, atherosclerotic heart disease, hyperlipidemia, CKD stage 2, hyperlipidemia, and dementia.    Resident is seen for a follow up from an ED visit.  Yesterday after seeing the resident and speaking with his sister, Archana, it was discussed that in the resident's best interest he should be go the ED for further evaluation regarding acute confusion.  He was seen yesterday in the emergency room.  CT, blood work, UA completed.  No acute findings relating to possible contributing factors for confusion.  Today on examine, he is alert and oriented x 2, still not at baseline.  He continues to reference \"\" who is telling him to stay in his room.  This writer reassured him that he could leave his room, he was not required to stay in bed.  Re-educated him in making slow positional changes to avoid dizziness.  Stated that he should work with physical therapy and be able to get back to walking the halls with his walker.      Reviewed resident with nursing staff.               Nursing and prior provider notes reviewed on this visit. Discussed visit with PCP and nursing staff/ supervisor.      Review of Systems   Constitutional:  Negative for activity change, appetite change, fatigue and fever.   HENT:  Negative for congestion and rhinorrhea.    Eyes:  Negative for pain and visual disturbance.   Respiratory:  Negative for cough and shortness of breath.    Cardiovascular:  Negative for chest pain and leg swelling.   Gastrointestinal:  Negative for abdominal pain and constipation.   Genitourinary:  Negative for difficulty urinating and dysuria.   Musculoskeletal:  Negative for arthralgias.   Skin:  Negative for color change and rash.   Neurological:  Negative for dizziness, syncope and " weakness.   Psychiatric/Behavioral:  Positive for sleep disturbance. Negative for behavioral problems and confusion.    All other systems reviewed and are negative.         ALLERGY: Reviewed, unchanged  No Known Allergies    HISTORY:  Medical Hx: Reviewed, unchanged  Family Hx: Reviewed, unchanged  Soc Hx: Reviewed,  unchanged      Physical Exam  Vitals reviewed.   Constitutional:       General: He is not in acute distress.     Appearance: Normal appearance. He is not ill-appearing.   HENT:      Head: Normocephalic.      Right Ear: Tympanic membrane normal.      Left Ear: Tympanic membrane normal.      Nose: Nose normal. No congestion.      Mouth/Throat:      Mouth: Mucous membranes are moist.      Pharynx: Oropharynx is clear.   Eyes:      General:         Right eye: No discharge.         Left eye: No discharge.      Extraocular Movements: Extraocular movements intact.      Conjunctiva/sclera: Conjunctivae normal.      Pupils: Pupils are equal, round, and reactive to light.   Cardiovascular:      Rate and Rhythm: Normal rate and regular rhythm.      Pulses: Normal pulses.      Heart sounds: Normal heart sounds.   Pulmonary:      Effort: Pulmonary effort is normal. No respiratory distress.      Breath sounds: Normal breath sounds.   Chest:      Chest wall: No tenderness.   Abdominal:      General: Bowel sounds are normal.      Palpations: Abdomen is soft.      Tenderness: There is no abdominal tenderness.   Musculoskeletal:         General: No swelling. Normal range of motion.      Cervical back: Normal range of motion.      Right lower leg: No edema.      Left lower leg: No edema.   Skin:     General: Skin is warm and dry.   Neurological:      Mental Status: He is alert.      Motor: No weakness.      Comments: Acute confusion   Psychiatric:         Mood and Affect: Mood normal.         Behavior: Behavior normal.         Judgment: Judgment normal.            Laboratory results / Imaging reviewed: Hard copy/ies in  "medical chart:    Vitals:    09/05/24 1213   BP: 100/66   Pulse: 78   Resp: 18   Temp: 98.2 °F (36.8 °C)       Current Medications:  All medications reviewed and updated in Nursing Home Chart    Please note: This note was completed in part utilizing a voice-recognition software may have been used in the preparation of this document. Grammatical errors, random word insertion, spelling mistakes, and incomplete sentences may be an occasional consequence of the system secondary to software limitations, ambient noise and hardware issues. Occasional wrong word or \"sound-alike\" substitutions may have occurred due to the inherent limitations of voice recognition software. At the time of dictation, efforts were made to edit, clarify and/or correct errors. Interpretation should be guided by context. Please read the chart carefully and recognize, using context, where substitutions have occurred. If you have any questions or concerns about the context, text or information contained within the body of this dictation, please contact myself, the provider, for further clarification.      TAM Joseph  9/10/2024  "

## 2024-09-10 NOTE — ASSESSMENT & PLAN NOTE
Fall overnight  Nursing found resident on the floor near the closets  Resident unsure if he hit his head  No signs of swelling, bruising, bleeding  Reports of hypoglycemia at time of fall  Was given snack/juice with BG going up after  Blood pressures have been low  Resident reports sometimes feeling dizzy when sitting up in bed/standing  Encouraged slow positional changes  History of orthostatic hypotension  Re-evaluation by physical therapy  Fall precautions

## 2024-09-10 NOTE — ASSESSMENT & PLAN NOTE
Maintains ambulatory status of independent with rolling walker  Encourage use of assistive device  Continue fall precautions   Encourage patient to participate with activities  Provide education for patient, family, and caregivers

## 2024-09-10 NOTE — ASSESSMENT & PLAN NOTE
Recent cardiology appointment on 02/23/24  Continue aspirin 81 mg  Continue atorvastatin 80 mg  Avoid beta blocker/ACE-I with history of orthostatic hypotension  Denies acute cardiac symptoms

## 2024-09-10 NOTE — ASSESSMENT & PLAN NOTE
Acute confusion post-fall  Alert and oriented x 1  Thought process and comments display confusion  Referencing people making comments of needing him to stay in bed or in the room?   Unsure if acute confusion is related to hitting head during fall?    Resident cannot recall hitting head  Transfer to ED for further evaluation

## 2024-10-02 ENCOUNTER — NURSING HOME VISIT (OUTPATIENT)
Dept: GERIATRICS | Facility: OTHER | Age: 69
End: 2024-10-02
Payer: MEDICARE

## 2024-10-02 DIAGNOSIS — N18.2 CKD (CHRONIC KIDNEY DISEASE) STAGE 2, GFR 60-89 ML/MIN: ICD-10-CM

## 2024-10-02 DIAGNOSIS — E10.65 TYPE 1 DIABETES MELLITUS WITH HYPERGLYCEMIA (HCC): ICD-10-CM

## 2024-10-02 DIAGNOSIS — I25.2 HX OF NON-ST ELEVATION MYOCARDIAL INFARCTION (NSTEMI): ICD-10-CM

## 2024-10-02 DIAGNOSIS — F22 DELUSION (HCC): ICD-10-CM

## 2024-10-02 DIAGNOSIS — I73.9 PERIPHERAL VASCULAR DISEASE, UNSPECIFIED (HCC): ICD-10-CM

## 2024-10-02 DIAGNOSIS — F09 MILD COGNITIVE DISORDER: ICD-10-CM

## 2024-10-02 DIAGNOSIS — I25.10 CORONARY ARTERY DISEASE INVOLVING NATIVE HEART WITHOUT ANGINA PECTORIS, UNSPECIFIED VESSEL OR LESION TYPE: ICD-10-CM

## 2024-10-02 DIAGNOSIS — Z86.718 HISTORY OF DVT (DEEP VEIN THROMBOSIS): ICD-10-CM

## 2024-10-02 DIAGNOSIS — I95.1 ORTHOSTATIC HYPOTENSION: Primary | ICD-10-CM

## 2024-10-02 DIAGNOSIS — R35.1 BENIGN PROSTATIC HYPERPLASIA WITH NOCTURIA: ICD-10-CM

## 2024-10-02 DIAGNOSIS — N40.1 BENIGN PROSTATIC HYPERPLASIA WITH NOCTURIA: ICD-10-CM

## 2024-10-02 DIAGNOSIS — E78.2 MIXED HYPERLIPIDEMIA: ICD-10-CM

## 2024-10-02 DIAGNOSIS — K59.09 OTHER CONSTIPATION: ICD-10-CM

## 2024-10-02 DIAGNOSIS — F51.01 PRIMARY INSOMNIA: ICD-10-CM

## 2024-10-02 DIAGNOSIS — F41.9 ANXIETY: ICD-10-CM

## 2024-10-02 PROCEDURE — 99310 SBSQ NF CARE HIGH MDM 45: CPT | Performed by: STUDENT IN AN ORGANIZED HEALTH CARE EDUCATION/TRAINING PROGRAM

## 2024-10-02 NOTE — ASSESSMENT & PLAN NOTE
Recent fall as per note in Sept 2024  Likely a multifactorial issue related to diabetes/hypoglycemia and poor PO intake/dehydration at the time  Appears he used to be on midodrine in the past. Monitor for need.  Monitor vitals. BP recently generally around 100s-120s systolic  Encourage fall precautions, appropriate DME use, slow positional changes, adequate PO hydration  Following Cardiology outpatient

## 2024-10-02 NOTE — ASSESSMENT & PLAN NOTE
Noted hx of cognitive impairment  Patient Aox3 and seems to be a fair historian though a bit forgetful and with some possibly mild paranoia in his statements, mentation seems stable  May require redirection and reorientation  Encourage participation with group activities  Encourage staying mentally active with word searches, crossword puzzles, and suduko   Avoid deliriogenic medications   Continue 24/7 supportive care at [facility]  Psych team following at facility

## 2024-10-02 NOTE — ASSESSMENT & PLAN NOTE
Lab Results   Component Value Date    HGBA1C 7.1 (H) 07/01/2024     Monitor A1c routinely - improved recently 7.1  Following Endocrinology, recent visit July 2024  Monitor glucose - still somewhat variable, but overall elevated lately, ranging 100s-300s generally. Fasting sugars generally variable in the mid-high 100s  Avoid hypoglycemia - had some previously, seems improved with recent regimen changes  Continue insulin with sliding scale  Continue novolog 10 units + sliding scale with meals  Continue lantus 20 u daily (recently decreased due to some hypoglycemia concern)  Adjust/titrate regimen as appropriate if sugars persistently elevated above goal  Follow up with Endocrine as scheduled

## 2024-10-02 NOTE — ASSESSMENT & PLAN NOTE
"Patient with some evident delusions in recent weeks mainly focused on the idea that someone I.e. \"the \" or \"the \" at facility is telling him he can or cannot do certain things such as telling him not to sleep with his hands in a certain position  He otherwise seems to be a fair historian and easily redirectable and does not seem to hold any other delusions or note any hallucinations or other concerning psychiatric symptoms. Possibly this is a mild delirium or a new baseline mentation after recent fall although trauma workup was unremarkable.  Psych hx mainly significant for anxiety/depression and insomnia  The delusion issue seems stable recently  Psychiatry following at facility and psychology also being consulted  Has been recently started on trazodone  Continue to monitor for acute change  Supportive care  Consider further workup/intervention in conjunction with Psych team if issue persists/worsens.  "

## 2024-10-02 NOTE — ASSESSMENT & PLAN NOTE
Lab Results   Component Value Date    EGFR 56 (L) 09/04/2024    EGFR 78 07/01/2024    EGFR 76 04/10/2024    CREATININE 1.37 (H) 09/04/2024    CREATININE 1.04 07/01/2024    CREATININE 1.06 04/10/2024     Monitor renal function on routine labs - recently in ER was noted to have elevation in Cr from his baseline, likely was related to dehydration at the time. Will recheck labs.  Avoid nephrotoxins, NSAIDs as able  Encourage PO hydration, respecting volume status

## 2024-10-02 NOTE — ASSESSMENT & PLAN NOTE
Noted hx  Mood stable, good spirits  Monitor off meds  Supportive care  Psych following at facility

## 2024-10-02 NOTE — ASSESSMENT & PLAN NOTE
BPH with LUTS  Following Urology, recent visit in Jan 2024, next Oct 2024  Started on flomax 0.4mg in Jan 2024  Monitor PSA routinely - WNL though some mild uptrend noted, continue to monitor  Seems symptomatically stable  CT abdomen pelvis completed on 09/04 during ED evaluation  Small enhancing nodule at the bladder base in the region of the trigone unchanged from prior

## 2024-10-02 NOTE — PROGRESS NOTES
[facility]  Facility: [facility]    PROGRESS NOTE  Nursing Home Place of Service: nursing home place of service: POS 32 Unskilled- No Part A Coverage    NAME: Tuan Remy  : 1955 AGE: 69 y.o. SEX: male MRN: [id number]  DATE OF ENCOUNTER: 10/2/2024    Assessment and Plan     Problem List Items Addressed This Visit       Hyperlipidemia     lipid panel (23)  cholesterol-109  triglycerides-61  LDL-53  HDL-44  Being treated with a high-intensity statin d/t prior history of a NSTEMI, PVD, and CAD  Continue atorvastatin 80 mg po daily, appears to be tolerating well  Goal LDL <70 per Cardiology         Orthostatic hypotension - Primary     Recent fall as per note in 2024  Likely a multifactorial issue related to diabetes/hypoglycemia and poor PO intake/dehydration at the time  Appears he used to be on midodrine in the past. Monitor for need.  Monitor vitals. BP recently generally around 100s-120s systolic  Encourage fall precautions, appropriate DME use, slow positional changes, adequate PO hydration  Following Cardiology outpatient          Peripheral vascular disease, unspecified (HCC)     Noted history  No apparent acute/associated concerns, seems stable  Continue aspirin, statin         Type 1 diabetes mellitus with hyperglycemia ([facility])       Lab Results   Component Value Date    HGBA1C 7.1 (H) 2024     Monitor A1c routinely - improved recently 7.1  Following Endocrinology, recent visit 2024  Monitor glucose - still somewhat variable, but overall elevated lately, ranging 100s-300s generally. Fasting sugars generally variable in the mid-high 100s  Avoid hypoglycemia - had some previously, seems improved with recent regimen changes  Continue insulin with sliding scale  Continue novolog 10 units + sliding scale with meals  Continue lantus 20 u daily (recently decreased due to some hypoglycemia concern)  Adjust/titrate regimen as appropriate if sugars persistently elevated  "above goal  Follow up with Endocrine as scheduled         Anxiety     Noted hx  Mood stable, good spirits  Monitor off meds  Supportive care  Psych following at facility         Coronary artery disease without angina pectoris     Lexiscan stress test:  1. Infarct involving the left circumflex territory/lateral wall with moderate  sonam-infarct ischemia.  2. Normal left ventricle systolic function with ejection fraction of 55%.     Previous Cardiology visit 1/26/23:  \"NSTEMI as noted previously. Currently doing well.  He will continue aggressive risk modification.  He will continue his current medications.  No further cardiac testing is indicated at this time. \"     Recent Cardiology visit 2/23/24:  To continue aspirin, statin. Avoided beta blocker/ACE-I with hx of orthostatic hypotension.        No acute cardiac complaints, seems stable  Continue aspirin, statin         History of DVT (deep vein thrombosis)     Follows Heme/Onc at [facility]  Hx of unprovoked VTE  To continue Eliquis for AC (lifelong per Hematology recommendation)         Other constipation     At risk due to recently not drinking quite enough, comorbidities  Monitor stool output - going fairly regularly but reports recent BMs hard  Bowel regimen at facility: continue docusate BID, senna qHS, change miralax from PRN to daily, adjust as appropriate; consider bisacodyl suppository PRN if no BM in 2-3 days  Encourage mobility as tolerated, PO hydration as appropriate, high fiber diet/prune juice (in outpatient setting as appropriate)  Goal is for 1 easy BM every 1-2 days           CKD (chronic kidney disease) stage 2, GFR 60-89 ml/min     Lab Results   Component Value Date    EGFR 56 (L) 09/04/2024    EGFR 78 07/01/2024    EGFR 76 04/10/2024    CREATININE 1.37 (H) 09/04/2024    CREATININE 1.04 07/01/2024    CREATININE 1.06 04/10/2024     Monitor renal function on routine labs - recently in ER was noted to have elevation in Cr from his baseline, likely was " "related to dehydration at the time. Will recheck labs.  Avoid nephrotoxins, NSAIDs as able  Encourage PO hydration, respecting volume status           Hx of non-ST elevation myocardial infarction (NSTEMI)     See plan under CAD          Mild cognitive disorder     Noted hx of cognitive impairment  Patient Aox3 and seems to be a fair historian though a bit forgetful and with some possibly mild paranoia in his statements, mentation seems stable  May require redirection and reorientation  Encourage participation with group activities  Encourage staying mentally active with word searches, crossword puzzles, and suduko   Avoid deliriogenic medications   Continue 24/7 supportive care at [facility]  Psych team following at facility         Primary insomnia     Stable  Continue melatonin, trazodone regimen  Supportive care, sleep hygiene         Benign prostatic hyperplasia with nocturia     BPH with LUTS  Following Urology, recent visit in Jan 2024, next Oct 2024  Started on flomax 0.4mg in Jan 2024  Monitor PSA routinely - WNL though some mild uptrend noted, continue to monitor  Seems symptomatically stable  CT abdomen pelvis completed on 09/04 during ED evaluation  Small enhancing nodule at the bladder base in the region of the trigone unchanged from prior         Delusion (HCC)     Patient with some evident delusions in recent weeks mainly focused on the idea that someone I.e. \"the \" or \"the \" at facility is telling him he can or cannot do certain things such as telling him not to sleep with his hands in a certain position  He otherwise seems to be a fair historian and easily redirectable and does not seem to hold any other delusions or note any hallucinations or other concerning psychiatric symptoms. Possibly this is a mild delirium or a new baseline mentation after recent fall although trauma workup was unremarkable.  Psych hx mainly significant for anxiety/depression and insomnia  The delusion issue seems stable " "recently  Psychiatry following at facility and psychology also being consulted  Has been recently started on trazodone  Continue to monitor for acute change  Supportive care  Consider further workup/intervention in conjunction with Psych team if issue persists/worsens.                    Chief Complaint     Follow up 60 day    History of Present Illness     Tuan Remy is a 69 y.o. male who was seen today for follow up. PMH including type 1 DM, orthostatic hypotension, peripheral vascular disease, atherosclerotic heart disease, hyperlipidemia, LCH, hyperlipidemia        Patient had a fall around a month ago. He was seen in [facility] ER 9/4/24 due to unwitnessed fall/confusion. Workup in ER did not suggest acute infection. Likely a multifactorial issue related to diabetes/hypoglycemia and poor PO intake/dehydration. Subsequently returned to facility.    His mood seems stable. His mentation seems generally stable as well. Questionably some recent paranoia noted by staff, patient did express to me today he thinks \"the \" at the facility has told him not to sleep in a certain position, though he otherwise appears to be rational in his thoughts at present with no other obvious delusions. He recalls his sister Archana. He notes he misses having a roommate and would be amenable to having a new roommate at some point. He is being followed by the psych team at facility.    Patient seen and examined in room  Others present: none  Patient seated on side of bed finishing breakfast  Appears comfortable, awake, alert, oriented to situation, able to converse appropriately  Polite, Aox3 though a bit forgetful of details, in good spirits, appearing similar to my prior visit in terms of mentation and mood. Notes he feels well overall with no acute concerns at present. Feels his mood is fine. No acute pain anywhere. Good appetite, no acute swallowing concerns. Reports generally regular BM though feels a bit constipated recently with " "BM being hard recently, no abd pain/N/V. Breathing fine, no orthopnea. Notes chronic intermittent orthostatic lightheadedness but none recently. No acute cardiopulmonary, abdominal, or urinary symptoms; see ROS for more details. He does not feel sick or confused. He keeps active and enjoys reading.  Gets around mainly with walker.   No further questions or acute concerns identified.  No further acute concerns per NP or nursing.     Lab Review:   9/14/23: A1c 7.5 (improved from 7.9 in July 2023)  12/28/23: PSA WNL (2.45)  1/29/24: CBC with Hb 11.8; CMP generally non-actionable, glu 50, eGFR 75; last TSH WNL from April 2023  4/10/24: CBC with Hb 11.4; BMP generally stable/non-actionable, eGFR 76; A1c 7.7  6/24/24: PSA WNL (3.42)  7/1/24: CBC with Hb 12.0; BMP generally stable/non-actionable, eGFR 78; LDL 52; TSH WNL; A1c 7.1  7/24/24: PSA 3.52 (WNL)  9/4/24: CMP with Cr 1.37, GFR 56 (baseline 60s-70s); CBC generally stable/non-actionable, Hb 12.3; blood cultures NGTD; urine culture with no notable growth      Lab Results   Component Value Date    HGBA1C 7.1 (H) 07/01/2024     Lab Results   Component Value Date    TSH 2.25 07/01/2024     No results found for: \"KRFUODCK28\"  Lab Results   Component Value Date    FERRITIN 339 (H) 04/28/2020     No results found for: \"CHOLESTEROL\"  No results found for: \"HDL\"  No results found for: \"TRIG\"  No results found for: \"NONHDLC\"  No results found for: \"LDLCALC\"        CTA abdomen pelvis w wo contrast  Result Date: 9/4/2024  1. No acute mass nor inflammatory change in the abdomen. Solid organs grossly stable. No evidence of abscess given history 2. Chronic circumferential distal esophageal wall thickening. Please correlate for chronic reflux esophagitis. 3. Chronic marked rectal fecal retention/possible impaction. Mass effect on the bladder. 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 4. Small enhancing nodule at the bladder base in the region of the trigone unchanged from prior see above " comments.     CT head wo contrast  Result Date: 9/4/2024  1.  No acute intracranial findings: No large acute transcortical infarct or intracranial hemorrhage. 2.  Mild white matter changes, which are nonspecific commonly seen with chronic microvascular ischemic change.    XR chest portable  Result Date: 9/4/2024  Impression: No acute pathology visualized.           Echo Oct 2022: EF 60-65, normal diastolic function       The following portions of the patient's history were reviewed and updated as appropriate: allergies, current medications, past family history, past medical history, past social history, past surgical history and problem list.    Review of Systems     Review of Systems   Constitutional:  Negative for appetite change, chills, diaphoresis and fever.   HENT:  Negative for drooling, ear pain, hearing loss, rhinorrhea, sore throat and trouble swallowing.    Eyes:  Negative for pain, discharge, redness, itching and visual disturbance.   Respiratory:  Negative for cough, chest tightness, shortness of breath and wheezing.    Cardiovascular:  Negative for chest pain, palpitations and leg swelling (mild, baseline).   Gastrointestinal:  Negative for abdominal pain, blood in stool, constipation, diarrhea, nausea and vomiting.   Genitourinary:  Negative for difficulty urinating, dysuria, flank pain and hematuria.   Musculoskeletal:  Positive for gait problem. Negative for arthralgias, back pain and neck pain.   Skin:  Negative for color change.   Neurological:  Positive for tremors (stable). Negative for dizziness, facial asymmetry, speech difficulty, weakness, light-headedness and headaches.   Psychiatric/Behavioral:  Negative for agitation, behavioral problems and confusion. The patient is not nervous/anxious and is not hyperactive.        Active Problem List     Patient Active Problem List   Diagnosis    Ambulatory dysfunction    Blurry vision, right eye    Hyperlipidemia    Langerhan's cell histiocytosis  (HCC)    Orthostatic hypotension    Peripheral vascular disease, unspecified (HCC)    Type 1 diabetes mellitus with hyperglycemia (HCC)    Sinus tachycardia    Anxiety    Reactive depression    Coronary artery disease without angina pectoris    Acute kidney injury superimposed on chronic kidney disease  (HCC)    History of DVT (deep vein thrombosis)    Hyperlipidemia due to type 1 diabetes mellitus  (HCC)    Other constipation    CKD (chronic kidney disease) stage 2, GFR 60-89 ml/min    Urinary frequency    Hx of non-ST elevation myocardial infarction (NSTEMI)    Mild cognitive disorder    Primary insomnia    Benign prostatic hyperplasia with nocturia    Advance care planning    Falls    Acute confusion    Delusion ([facility])       Objective     Vital Signs:     BP: 109/66 mmHg  9/27/2024 16:24   Temp:97.4 °F  9/6/2024 16:43 Pulse:80 bpm  9/27/2024 08:36 Weight:200.4 Lbs  10/2/2024 04:42   Resp:18 Breaths/min  9/6/2024 16:43 BS:74 mg/dL  10/2/2024 18:00 O2:97 %  9/5/2024 02:11 Pain:0  10/2/2024 15:47       Physical Exam  Vitals reviewed.   Constitutional:       General: He is not in acute distress.     Appearance: He is not toxic-appearing or diaphoretic.   HENT:      Head: Normocephalic and atraumatic.      Right Ear: External ear normal.      Left Ear: External ear normal.      Nose: Nose normal. No rhinorrhea.      Mouth/Throat:      Mouth: Mucous membranes are moist.      Pharynx: Oropharynx is clear. No oropharyngeal exudate or posterior oropharyngeal erythema.   Eyes:      General: No scleral icterus.        Right eye: No discharge.         Left eye: No discharge.      Extraocular Movements: Extraocular movements intact.      Conjunctiva/sclera: Conjunctivae normal.      Pupils: Pupils are equal, round, and reactive to light.   Cardiovascular:      Rate and Rhythm: Normal rate and regular rhythm.   Pulmonary:      Effort: Pulmonary effort is normal. No respiratory distress.      Breath sounds: No wheezing or rales.    Abdominal:      General: Bowel sounds are normal.      Palpations: Abdomen is soft.      Tenderness: There is no abdominal tenderness. There is no guarding.   Musculoskeletal:         General: No tenderness.      Cervical back: No rigidity.      Comments: Trace bilateral LE edema (chronic per patient, appears stable)   Skin:     General: Skin is warm and dry.      Coloration: Skin is not jaundiced.   Neurological:      General: No focal deficit present.      Mental Status: He is alert and oriented to person, place, and time. Mental status is at baseline.   Psychiatric:         Mood and Affect: Mood normal.         Behavior: Behavior normal.         Thought Content: Thought content normal.         Judgment: Judgment normal.         Pertinent Laboratory/Diagnostic Studies:  Laboratory and Imaging studies reviewed. Full report in the paper chart.     Current Medications   Medications reviewed and updated in facility chart.      -Total time spent on this encounter today including documentation and workup review, face to face time, history and exam, and documentation/orders, recent ER visit review, psych team review/reconciliation, was approximately 55 minutes.  -This note will be copied to [facility] EMR where vitals and medication orders are placed.    Ryan Chester D.O.  Geriatric Medicine  10/2/2024 7:10 PM

## 2024-10-20 ENCOUNTER — TELEPHONE (OUTPATIENT)
Dept: OTHER | Facility: OTHER | Age: 69
End: 2024-10-20

## 2024-10-20 NOTE — TELEPHONE ENCOUNTER
Brandee staff from [facility] needs a call back # [phone] for pt low blood sugars/lethargy  Paged out to on call KO

## 2024-10-21 ENCOUNTER — NURSING HOME VISIT (OUTPATIENT)
Dept: GERIATRICS | Facility: OTHER | Age: 69
End: 2024-10-21
Payer: MEDICARE

## 2024-10-21 VITALS
TEMPERATURE: 97.3 F | WEIGHT: 200.4 LBS | SYSTOLIC BLOOD PRESSURE: 134 MMHG | HEART RATE: 80 BPM | BODY MASS INDEX: 28.75 KG/M2 | DIASTOLIC BLOOD PRESSURE: 72 MMHG | OXYGEN SATURATION: 98 % | RESPIRATION RATE: 20 BRPM

## 2024-10-21 DIAGNOSIS — F22 DELUSION (HCC): ICD-10-CM

## 2024-10-21 DIAGNOSIS — E10.65 TYPE 1 DIABETES MELLITUS WITH HYPERGLYCEMIA (HCC): Primary | ICD-10-CM

## 2024-10-21 DIAGNOSIS — F51.01 PRIMARY INSOMNIA: ICD-10-CM

## 2024-10-21 PROCEDURE — 99308 SBSQ NF CARE LOW MDM 20: CPT

## 2024-10-21 RX ORDER — QUETIAPINE FUMARATE 25 MG/1
12.5 TABLET, FILM COATED ORAL
COMMUNITY

## 2024-10-21 RX ORDER — IBUPROFEN 600 MG/1
1 TABLET ORAL
COMMUNITY

## 2024-10-21 NOTE — ASSESSMENT & PLAN NOTE
"Resident has been referencing \"the \" as telling him that he cannot do certain things at the facility, like walking around the unit  Following with psychiatry services  Was started on trazodone with limited help in his sleeping at night  Was weaned off  Can start seroquel 12.5 mg po QHS on 10/21  Continue to monitor  Encourage sleep/wake cycle  Monitor percentage eaten of meals  Encourage resident to be out of room for activities  Supportive care  "

## 2024-10-21 NOTE — PROGRESS NOTES
"[facility]  [address] [place] [address], [address], [place] [address]  [phone]    NAME: Tuan Remy  AGE: 69 y.o. SEX: male    Progress Note    Location: [facility]  POS: 32 ([facility])  Code Status: Full Code    Chief complaint / Reason for visit:  Acute Visit-hypoglycemia    Assessment/Plan:    Type 1 diabetes mellitus with hyperglycemia (HCC)  Recent episode of hypoglycemia on 10/20  Blood glucose came up with juice and snacks  Encourage CCD  Appears to eat % of meals  AC HS blood glucose checks  Blood glucose checks reviewed from facility records  Blood sugars mainly controlled, only 3 low events in the month of October  Hypoglycemia protocol  Addition of glucagon 1mg injection for BG<60  Continue novolog 10 units TID with meals  Continue lantus 20 units SQ at lunch  Repeat HgA1c Q3 months   Recent HgA1c on 07/01/24 was 7.1  Follow up with Endocrinology    Delusion (HCC)  Resident has been referencing \"the \" as telling him that he cannot do certain things at the facility, like walking around the unit  Following with psychiatry services  Was started on trazodone with limited help in his sleeping at night  Was weaned off  Can start seroquel 12.5 mg po QHS on 10/21  Continue to monitor  Encourage sleep/wake cycle  Monitor percentage eaten of meals  Encourage resident to be out of room for activities  Supportive care    Primary insomnia  Avoid sedative hypnotics such as benzodiazepines and benadryl  Encourage staying awake during the day  Encourage daytime activity, morning exercise  Decrease or eliminate day time naps  Avoid caffiene, alcohol especially during late afternoon and evening hours  Establish a night time routine  Recent change of rooms to help with sleeping at night  Continue melatonin 3 mg po daily at bedtime  Had trial of trazodone, ultimately weaned off as ineffective   Will start seroquel 12.5 mg po QHS on 10/21            This is a 69 y.o. male seen today at [facility] " "[facility]. Medical history includes, not limited to, type 1 DM, orthostatic hypotension, peripheral vascular disease, atherosclerotic heart disease, hyperlipidemia, CKD stage 2, hyperlipidemia, and dementia.    Resident is seen for an acute visit following a hypoglycemic episode.  Per facility note he was noted to have a blood sugar of 52.  He was noted to have eaten his full dinner, was provided juice and snacks.  His BS came up to 148.  Insulins were held for dinnertime.  At the time of his hypoglycemic episode he was lethargic.  Resident is seen today laying in bed.  He denies pain.  States that he has been eating well.  Continues with delusional thoughts, referencing, \"the \" during our conversation.  Reiterated to him that it was ok to get out of his room and walk around with his walker in the hallway.  For him to make positional changes slowly to not feel dizzy.      Reviewed resident with nursing staff.  New order placed.               Nursing and prior provider notes reviewed on this visit. Discussed visit with PCP and nursing staff/ supervisor.      Review of Systems   Constitutional:  Negative for activity change, appetite change, fatigue and fever.   HENT:  Negative for congestion and rhinorrhea.    Eyes:  Negative for pain and visual disturbance.   Respiratory:  Negative for cough and shortness of breath.    Cardiovascular:  Negative for chest pain and leg swelling.   Gastrointestinal:  Negative for abdominal pain and constipation.   Genitourinary:  Negative for difficulty urinating and dysuria.   Musculoskeletal:  Negative for arthralgias.   Skin:  Negative for color change and rash.   Neurological:  Negative for dizziness, syncope and weakness.   Psychiatric/Behavioral:  Positive for sleep disturbance. Negative for behavioral problems and confusion.    All other systems reviewed and are negative.         ALLERGY: Reviewed, unchanged  No Known Allergies    HISTORY:  Medical Hx: Reviewed, " unchanged  Family Hx: Reviewed, unchanged  Soc Hx: Reviewed,  unchanged      Physical Exam  Vitals reviewed.   Constitutional:       General: He is not in acute distress.     Appearance: Normal appearance. He is not ill-appearing.   HENT:      Head: Normocephalic.      Right Ear: Tympanic membrane normal.      Left Ear: Tympanic membrane normal.      Nose: Nose normal. No congestion.      Mouth/Throat:      Mouth: Mucous membranes are moist.      Pharynx: Oropharynx is clear.   Eyes:      General:         Right eye: No discharge.         Left eye: No discharge.      Extraocular Movements: Extraocular movements intact.      Conjunctiva/sclera: Conjunctivae normal.      Pupils: Pupils are equal, round, and reactive to light.   Cardiovascular:      Rate and Rhythm: Normal rate and regular rhythm.      Pulses: Normal pulses.      Heart sounds: Normal heart sounds.   Pulmonary:      Effort: Pulmonary effort is normal. No respiratory distress.      Breath sounds: Normal breath sounds.   Chest:      Chest wall: No tenderness.   Abdominal:      General: Bowel sounds are normal.      Palpations: Abdomen is soft.      Tenderness: There is no abdominal tenderness.   Musculoskeletal:         General: No swelling. Normal range of motion.      Cervical back: Normal range of motion.      Right lower leg: No edema.      Left lower leg: No edema.   Skin:     General: Skin is warm and dry.   Neurological:      Mental Status: He is alert.      Motor: No weakness.      Comments: Acute confusion   Psychiatric:         Mood and Affect: Mood normal.         Behavior: Behavior normal.         Judgment: Judgment normal.            Laboratory results / Imaging reviewed: Hard copy/ies in medical chart:    Vitals:    10/21/24 1811   BP: 134/72   Pulse: 80   Resp: 20   Temp: (!) 97.3 °F (36.3 °C)   SpO2: 98%       Current Medications:  All medications reviewed and updated in Nursing Home Chart    Please note: This note was completed in part  "utilizing a voice-recognition software may have been used in the preparation of this document. Grammatical errors, random word insertion, spelling mistakes, and incomplete sentences may be an occasional consequence of the system secondary to software limitations, ambient noise and hardware issues. Occasional wrong word or \"sound-alike\" substitutions may have occurred due to the inherent limitations of voice recognition software. At the time of dictation, efforts were made to edit, clarify and/or correct errors. Interpretation should be guided by context. Please read the chart carefully and recognize, using context, where substitutions have occurred. If you have any questions or concerns about the context, text or information contained within the body of this dictation, please contact myself, the provider, for further clarification.      TAM Joseph  10/21/2024  "

## 2024-10-21 NOTE — ASSESSMENT & PLAN NOTE
Recent episode of hypoglycemia on 10/20  Blood glucose came up with juice and snacks  Encourage CCD  Appears to eat % of meals  AC HS blood glucose checks  Blood glucose checks reviewed from facility records  Blood sugars mainly controlled, only 3 low events in the month of October  Hypoglycemia protocol  Addition of glucagon 1mg injection for BG<60  Continue novolog 10 units TID with meals  Continue lantus 20 units SQ at lunch  Repeat HgA1c Q3 months   Recent HgA1c on 07/01/24 was 7.1  Follow up with Endocrinology

## 2024-10-21 NOTE — ASSESSMENT & PLAN NOTE
Avoid sedative hypnotics such as benzodiazepines and benadryl  Encourage staying awake during the day  Encourage daytime activity, morning exercise  Decrease or eliminate day time naps  Avoid caffiene, alcohol especially during late afternoon and evening hours  Establish a night time routine  Recent change of rooms to help with sleeping at night  Continue melatonin 3 mg po daily at bedtime  Had trial of trazodone, ultimately weaned off as ineffective   Will start seroquel 12.5 mg po QHS on 10/21

## 2024-10-22 ENCOUNTER — TELEPHONE (OUTPATIENT)
Dept: OTHER | Facility: OTHER | Age: 69
End: 2024-10-22

## 2024-10-23 NOTE — TELEPHONE ENCOUNTER
Juanpablo from [facility] called in regarding the Patient's low blood sugar.    Contacted the on call via ESC.

## 2024-10-30 ENCOUNTER — NURSING HOME VISIT (OUTPATIENT)
Dept: GERIATRICS | Facility: OTHER | Age: 69
End: 2024-10-30

## 2024-10-30 VITALS
DIASTOLIC BLOOD PRESSURE: 63 MMHG | RESPIRATION RATE: 18 BRPM | SYSTOLIC BLOOD PRESSURE: 141 MMHG | OXYGEN SATURATION: 96 % | TEMPERATURE: 98.4 F | HEART RATE: 70 BPM

## 2024-10-30 DIAGNOSIS — E10.65 TYPE 1 DIABETES MELLITUS WITH HYPERGLYCEMIA (HCC): Primary | ICD-10-CM

## 2024-10-30 NOTE — PROGRESS NOTES
Nursing Home Acute Visit    Name: Tuan Remy      : 1955      MRN: [id number]  Encounter Provider: TAM Fishman  Encounter Date: 10/30/2024   Encounter department: [facility]    POS 32    Assessment & Plan  Type 1 diabetes mellitus with hyperglycemia (HCC)  -Lantus time of administration changed to 20 units at hour of sleep daily.  -Encourage HS snack.  -Continue to hold meal time insulin if blood sugar is less than 70  -Continue to monitor blood sugar regular           History of Present Illness   Tuan Remy is a 69 y.o. male who was examined today in his room for a blood sugar of 415. Patient was alert, awake and in no distress. Physical  assessment was benign. Discussed plan of care with patient on changes will make to his insulin regimen and he was agreeable.      Review of Systems        Past Medical History   Past Medical History:   Diagnosis Date    Acute kidney injury (HCC)     ROSANA (acute kidney injury) (HCC) 2015    Dehydration     Diabetes mellitus (HCC)     Type 1    Hyperlipidemia associated with type 2 diabetes mellitus  (HCC) 2020    Last Assessment & Plan:  Per Dr. Blanco. Ok for patient to hold simvastatin due to GI upset    Hyponatremia 10/18/2022    Lymphoma (HCC)     Urinary retention 2018    Vomiting 2023     No past surgical history on file.  No family history on file.  Current Outpatient Medications on File Prior to Visit   Medication Sig Dispense Refill    apixaban (ELIQUIS) 2.5 mg Take 1 tablet by mouth 2 (two) times a day      aspirin 81 mg chewable tablet Chew 81 mg daily      atorvastatin (LIPITOR) 80 mg tablet Take 80 mg by mouth every evening      Calcium Carbonate-Vitamin D 600-200 MG-UNIT CAPS Take 1 tablet by mouth 2 (two) times a day      Dextrose, Diabetic Use, 15 GM/33GM GEL Take 1 packet by mouth      docusate sodium (COLACE) 100 mg capsule Take 100 mg by mouth 2 (two) times a day      dorzolamide-timolol (COSOPT)  22.3-6.8 MG/ML ophthalmic solution Administer 1 drop to the right eye 2 (two) times a day      Glucagon, rDNA, (Glucagon Emergency) 1 MG KIT Inject 1 mg as directed      insulin aspart (NovoLOG FlexPen) 100 UNIT/ML injection pen Inject 10 Units under the skin 3 (three) times a day with meals      insulin glargine (LANTUS) 100 units/mL subcutaneous injection Inject 20 Units under the skin in the morning      insulin lispro (HumaLOG) 100 units/mL injection Inject 1-6 Units under the skin 3 (three) times a day before meals Blood Glucose 150 - 189: 1 Unit of Insulin  Blood Glucose 190 - 229: 2 Units of Insulin  Blood Glucose 230 - 269: 3 Units of Insulin  Blood Glucose 270 - 309: 4 Units of Insulin  Blood Glucose 310 - 349: 5 Units of Insulin  Blood Glucose greater than or equal to 350: 6 Units of Insulin 10 mL 0    latanoprost (XALATAN) 0.005 % ophthalmic solution Administer 1 drop to the right eye daily at bedtime      melatonin 3 mg Take 3 mg by mouth daily at bedtime      polyethylene glycol (MIRALAX) 17 g packet Take 17 g by mouth daily 30 each 0    QUEtiapine (SEROquel) 25 mg tablet Take 12.5 mg by mouth daily at bedtime      senna (SENOKOT) 8.6 MG tablet Take 1 tablet by mouth daily at bedtime      tamsulosin (Flomax) 0.4 mg Take 0.4 mg by mouth daily with dinner       No current facility-administered medications on file prior to visit.   No Known Allergies   Current Outpatient Medications on File Prior to Visit   Medication Sig Dispense Refill    apixaban (ELIQUIS) 2.5 mg Take 1 tablet by mouth 2 (two) times a day      aspirin 81 mg chewable tablet Chew 81 mg daily      atorvastatin (LIPITOR) 80 mg tablet Take 80 mg by mouth every evening      Calcium Carbonate-Vitamin D 600-200 MG-UNIT CAPS Take 1 tablet by mouth 2 (two) times a day      Dextrose, Diabetic Use, 15 GM/33GM GEL Take 1 packet by mouth      docusate sodium (COLACE) 100 mg capsule Take 100 mg by mouth 2 (two) times a day      dorzolamide-timolol  (COSOPT) 22.3-6.8 MG/ML ophthalmic solution Administer 1 drop to the right eye 2 (two) times a day      Glucagon, rDNA, (Glucagon Emergency) 1 MG KIT Inject 1 mg as directed      insulin aspart (NovoLOG FlexPen) 100 UNIT/ML injection pen Inject 10 Units under the skin 3 (three) times a day with meals      insulin glargine (LANTUS) 100 units/mL subcutaneous injection Inject 20 Units under the skin in the morning      insulin lispro (HumaLOG) 100 units/mL injection Inject 1-6 Units under the skin 3 (three) times a day before meals Blood Glucose 150 - 189: 1 Unit of Insulin  Blood Glucose 190 - 229: 2 Units of Insulin  Blood Glucose 230 - 269: 3 Units of Insulin  Blood Glucose 270 - 309: 4 Units of Insulin  Blood Glucose 310 - 349: 5 Units of Insulin  Blood Glucose greater than or equal to 350: 6 Units of Insulin 10 mL 0    latanoprost (XALATAN) 0.005 % ophthalmic solution Administer 1 drop to the right eye daily at bedtime      melatonin 3 mg Take 3 mg by mouth daily at bedtime      polyethylene glycol (MIRALAX) 17 g packet Take 17 g by mouth daily 30 each 0    QUEtiapine (SEROquel) 25 mg tablet Take 12.5 mg by mouth daily at bedtime      senna (SENOKOT) 8.6 MG tablet Take 1 tablet by mouth daily at bedtime      tamsulosin (Flomax) 0.4 mg Take 0.4 mg by mouth daily with dinner       No current facility-administered medications on file prior to visit.      Social History     Tobacco Use    Smoking status: Never    Smokeless tobacco: Never   Vaping Use    Vaping status: Never Used   Substance and Sexual Activity    Alcohol use: Not Currently    Drug use: Never    Sexual activity: Not on file         Objective   Vital Signs  /63   Pulse 70   Temp 98.4 °F (36.9 °C)   Resp 18   SpO2 96%     Physical Exam      Mary TURCIOS  10/30/2024 @140

## 2024-10-30 NOTE — ASSESSMENT & PLAN NOTE
-Lantus time of administration changed to 20 units at hour of sleep daily.  -Encourage HS snack.  -Continue to hold meal time insulin if blood sugar is less than 70  -Continue to monitor blood sugar regular

## 2024-10-31 ENCOUNTER — NURSING HOME VISIT (OUTPATIENT)
Dept: GERIATRICS | Facility: OTHER | Age: 69
End: 2024-10-31

## 2024-10-31 VITALS
HEART RATE: 96 BPM | RESPIRATION RATE: 18 BRPM | DIASTOLIC BLOOD PRESSURE: 63 MMHG | TEMPERATURE: 98.4 F | SYSTOLIC BLOOD PRESSURE: 141 MMHG | OXYGEN SATURATION: 96 %

## 2024-10-31 DIAGNOSIS — H53.8 BLURRY VISION, RIGHT EYE: ICD-10-CM

## 2024-10-31 DIAGNOSIS — E10.65 TYPE 1 DIABETES MELLITUS WITH HYPERGLYCEMIA (HCC): Primary | ICD-10-CM

## 2024-10-31 DIAGNOSIS — I95.1 ORTHOSTATIC HYPOTENSION: ICD-10-CM

## 2024-10-31 DIAGNOSIS — N18.2 CKD (CHRONIC KIDNEY DISEASE) STAGE 2, GFR 60-89 ML/MIN: ICD-10-CM

## 2024-10-31 NOTE — ASSESSMENT & PLAN NOTE
Current HGA1C- 8.4  -Continue current insulin regimen Lantus 20 units at hs   Insulin 10 units with breakfast and lunch, 6 units at dinner and sliding scale.  -Continue CCD diet.  -Encourage HS snack.  -Continue to monitor blood sugar.  -Follow up with endocrinology as scheduled and as needed.  -Will monitor HGA1C as scheduled next due 01/2025

## 2024-10-31 NOTE — PROGRESS NOTES
Nursing Home Visit    Name: Tuan Remy      : 1955      MRN: [id number]  Encounter Provider: TAM Fishman  Encounter Date: 10/31/2024   Encounter department: [facility]    POS 32  Assessment & Plan  Type 1 diabetes mellitus with hyperglycemia (HCC)    Current HGA1C- 8.4  -Continue current insulin regimen Lantus 20 units at hs   Insulin 10 units with breakfast and lunch, 6 units at dinner and sliding scale.  -Continue CCD diet.  -Encourage HS snack.  -Continue to monitor blood sugar.  -Follow up with endocrinology as scheduled and as needed.  -Will monitor HGA1C as scheduled next due 2025         CKD (chronic kidney disease) stage 2, GFR 60-89 ml/min  Pertinent labs from 10/30/2024 reviewed   BUN/CREAT-20/1.05, GFR-77 values are within normal limits with noted improvement in GFR was 56 on 2024  -Encourage hydration.  - Continue to monitor renal function routinely.  -Continue Tylenol for pain management avoid NSAIDS.         Orthostatic hypotension  -Current Blood pressure 141/63 , review of blood pressure systolic  and diastolic 50-70 in range. No episodes of hypotension currently reported.  -Continue to monitor blood pressure regularly as ordered.  -Encourage hydration.  -Continue fall precautions.  -Follow up with cardiology as scheduled.         Blurry vision, right eye  -Patient had visit with ophthalmology today with recommendation for Panretinal  laser photocoagulation to right eye.  -Follow up with ophthalmology as scheduled.             History of Present Illness     Tuan Remy is a 69 y.o. male who was examined today for follow up condition of diabetes and other chronic conditions. Patient was alert, verbal and able to make needs known. Blood sugar reviewed after changes to timing of lantus and decrease of routine dinner insulin noted to have improved. AM blood sugar today was 93 patient was asymptomatic as per both patient and his nurse. Offered  no other concerns at this time.      Review of Systems   Constitutional:  Negative for activity change, fever and unexpected weight change.   HENT:  Negative for congestion.    Eyes:  Positive for visual disturbance.   Respiratory:  Negative for cough.    Cardiovascular:  Negative for chest pain, palpitations and leg swelling.   Gastrointestinal:  Negative for constipation.   Genitourinary:  Negative for dysuria and urgency.   Musculoskeletal:  Negative for arthralgias.   Neurological:  Negative for dizziness and headaches.   Hematological:  Does not bruise/bleed easily.   Psychiatric/Behavioral:  Negative for behavioral problems and sleep disturbance. The patient is not nervous/anxious.          Past Medical History   Past Medical History:   Diagnosis Date    Acute kidney injury (HCC)     ROSANA (acute kidney injury) (HCC) 12/28/2015    Dehydration     Diabetes mellitus (HCC)     Type 1    Hyperlipidemia associated with type 2 diabetes mellitus  (HCC) 1/17/2020    Last Assessment & Plan:  Per Dr. Blanco. Ok for patient to hold simvastatin due to GI upset    Hyponatremia 10/18/2022    Lymphoma (HCC)     Urinary retention 8/29/2018    Vomiting 1/18/2023     No past surgical history on file.  No family history on file.  Current Outpatient Medications on File Prior to Visit   Medication Sig Dispense Refill    apixaban (ELIQUIS) 2.5 mg Take 1 tablet by mouth 2 (two) times a day      aspirin 81 mg chewable tablet Chew 81 mg daily      atorvastatin (LIPITOR) 80 mg tablet Take 80 mg by mouth every evening      Calcium Carbonate-Vitamin D 600-200 MG-UNIT CAPS Take 1 tablet by mouth 2 (two) times a day      Dextrose, Diabetic Use, 15 GM/33GM GEL Take 1 packet by mouth      docusate sodium (COLACE) 100 mg capsule Take 100 mg by mouth 2 (two) times a day      dorzolamide-timolol (COSOPT) 22.3-6.8 MG/ML ophthalmic solution Administer 1 drop to the right eye 2 (two) times a day      Glucagon, rDNA, (Glucagon Emergency) 1 MG KIT  Inject 1 mg as directed      insulin aspart (NovoLOG FlexPen) 100 UNIT/ML injection pen Inject 10 Units under the skin 3 (three) times a day with meals      insulin glargine (LANTUS) 100 units/mL subcutaneous injection Inject 20 Units under the skin in the morning      insulin lispro (HumaLOG) 100 units/mL injection Inject 1-6 Units under the skin 3 (three) times a day before meals Blood Glucose 150 - 189: 1 Unit of Insulin  Blood Glucose 190 - 229: 2 Units of Insulin  Blood Glucose 230 - 269: 3 Units of Insulin  Blood Glucose 270 - 309: 4 Units of Insulin  Blood Glucose 310 - 349: 5 Units of Insulin  Blood Glucose greater than or equal to 350: 6 Units of Insulin 10 mL 0    latanoprost (XALATAN) 0.005 % ophthalmic solution Administer 1 drop to the right eye daily at bedtime      melatonin 3 mg Take 3 mg by mouth daily at bedtime      polyethylene glycol (MIRALAX) 17 g packet Take 17 g by mouth daily 30 each 0    QUEtiapine (SEROquel) 25 mg tablet Take 12.5 mg by mouth daily at bedtime      senna (SENOKOT) 8.6 MG tablet Take 1 tablet by mouth daily at bedtime      tamsulosin (Flomax) 0.4 mg Take 0.4 mg by mouth daily with dinner       No current facility-administered medications on file prior to visit.   No Known Allergies   Current Outpatient Medications on File Prior to Visit   Medication Sig Dispense Refill    apixaban (ELIQUIS) 2.5 mg Take 1 tablet by mouth 2 (two) times a day      aspirin 81 mg chewable tablet Chew 81 mg daily      atorvastatin (LIPITOR) 80 mg tablet Take 80 mg by mouth every evening      Calcium Carbonate-Vitamin D 600-200 MG-UNIT CAPS Take 1 tablet by mouth 2 (two) times a day      Dextrose, Diabetic Use, 15 GM/33GM GEL Take 1 packet by mouth      docusate sodium (COLACE) 100 mg capsule Take 100 mg by mouth 2 (two) times a day      dorzolamide-timolol (COSOPT) 22.3-6.8 MG/ML ophthalmic solution Administer 1 drop to the right eye 2 (two) times a day      Glucagon, rDNA, (Glucagon Emergency) 1  MG KIT Inject 1 mg as directed      insulin aspart (NovoLOG FlexPen) 100 UNIT/ML injection pen Inject 10 Units under the skin 3 (three) times a day with meals      insulin glargine (LANTUS) 100 units/mL subcutaneous injection Inject 20 Units under the skin in the morning      insulin lispro (HumaLOG) 100 units/mL injection Inject 1-6 Units under the skin 3 (three) times a day before meals Blood Glucose 150 - 189: 1 Unit of Insulin  Blood Glucose 190 - 229: 2 Units of Insulin  Blood Glucose 230 - 269: 3 Units of Insulin  Blood Glucose 270 - 309: 4 Units of Insulin  Blood Glucose 310 - 349: 5 Units of Insulin  Blood Glucose greater than or equal to 350: 6 Units of Insulin 10 mL 0    latanoprost (XALATAN) 0.005 % ophthalmic solution Administer 1 drop to the right eye daily at bedtime      melatonin 3 mg Take 3 mg by mouth daily at bedtime      polyethylene glycol (MIRALAX) 17 g packet Take 17 g by mouth daily 30 each 0    QUEtiapine (SEROquel) 25 mg tablet Take 12.5 mg by mouth daily at bedtime      senna (SENOKOT) 8.6 MG tablet Take 1 tablet by mouth daily at bedtime      tamsulosin (Flomax) 0.4 mg Take 0.4 mg by mouth daily with dinner       No current facility-administered medications on file prior to visit.      Social History     Tobacco Use    Smoking status: Never    Smokeless tobacco: Never   Vaping Use    Vaping status: Never Used   Substance and Sexual Activity    Alcohol use: Not Currently    Drug use: Never    Sexual activity: Not on file         Objective     Vital Signs    /63   Pulse 96   Temp 98.4 °F (36.9 °C)   Resp 18   SpO2 96%     Physical Exam  HENT:      Head: Normocephalic.   Cardiovascular:      Rate and Rhythm: Normal rate and regular rhythm.      Pulses: Normal pulses.      Heart sounds: Normal heart sounds.   Pulmonary:      Effort: Pulmonary effort is normal.      Breath sounds: Normal breath sounds.   Abdominal:      General: Bowel sounds are normal.      Palpations: Abdomen is  soft.   Skin:     General: Skin is warm and dry.      Capillary Refill: Capillary refill takes less than 2 seconds.   Neurological:      Mental Status: He is alert. Mental status is at baseline.      Gait: Gait abnormal.   Psychiatric:         Mood and Affect: Mood normal.         Behavior: Behavior normal.         Mary Luiswilfredo  10/31/2024@2124

## 2024-11-01 NOTE — ASSESSMENT & PLAN NOTE
Pertinent labs from 10/30/2024 reviewed   BUN/CREAT-20/1.05, GFR-77 values are within normal limits with noted improvement in GFR was 56 on 09/04/2024  -Encourage hydration.  - Continue to monitor renal function routinely.  -Continue Tylenol for pain management avoid NSAIDS.

## 2024-11-01 NOTE — ASSESSMENT & PLAN NOTE
-Patient had visit with ophthalmology today with recommendation for Panretinal  laser photocoagulation to right eye.  -Follow up with ophthalmology as scheduled.

## 2024-11-01 NOTE — ASSESSMENT & PLAN NOTE
-Current Blood pressure 141/63 , review of blood pressure systolic  and diastolic 50-70 in range. No episodes of hypotension currently reported.  -Continue to monitor blood pressure regularly as ordered.  -Encourage hydration.  -Continue fall precautions.  -Follow up with cardiology as scheduled.

## 2024-12-04 ENCOUNTER — NURSING HOME VISIT (OUTPATIENT)
Dept: GERIATRICS | Facility: OTHER | Age: 69
End: 2024-12-04
Payer: MEDICARE

## 2024-12-04 DIAGNOSIS — N18.2 CKD (CHRONIC KIDNEY DISEASE) STAGE 2, GFR 60-89 ML/MIN: ICD-10-CM

## 2024-12-04 DIAGNOSIS — Z86.718 HISTORY OF DVT (DEEP VEIN THROMBOSIS): ICD-10-CM

## 2024-12-04 DIAGNOSIS — F22 DELUSION (HCC): Primary | ICD-10-CM

## 2024-12-04 DIAGNOSIS — F41.9 ANXIETY: ICD-10-CM

## 2024-12-04 DIAGNOSIS — N40.1 BENIGN PROSTATIC HYPERPLASIA WITH NOCTURIA: ICD-10-CM

## 2024-12-04 DIAGNOSIS — F51.01 PRIMARY INSOMNIA: ICD-10-CM

## 2024-12-04 DIAGNOSIS — I73.9 PERIPHERAL VASCULAR DISEASE, UNSPECIFIED (HCC): ICD-10-CM

## 2024-12-04 DIAGNOSIS — E10.65 TYPE 1 DIABETES MELLITUS WITH HYPERGLYCEMIA (HCC): ICD-10-CM

## 2024-12-04 DIAGNOSIS — I25.10 CORONARY ARTERY DISEASE INVOLVING NATIVE HEART WITHOUT ANGINA PECTORIS, UNSPECIFIED VESSEL OR LESION TYPE: ICD-10-CM

## 2024-12-04 DIAGNOSIS — Z53.20 COLONOSCOPY REFUSED: ICD-10-CM

## 2024-12-04 DIAGNOSIS — F09 MILD COGNITIVE DISORDER: ICD-10-CM

## 2024-12-04 DIAGNOSIS — C96.6 LANGERHAN'S CELL HISTIOCYTOSIS (HCC): ICD-10-CM

## 2024-12-04 DIAGNOSIS — R35.1 BENIGN PROSTATIC HYPERPLASIA WITH NOCTURIA: ICD-10-CM

## 2024-12-04 DIAGNOSIS — I25.2 HX OF NON-ST ELEVATION MYOCARDIAL INFARCTION (NSTEMI): ICD-10-CM

## 2024-12-04 DIAGNOSIS — E78.2 MIXED HYPERLIPIDEMIA: ICD-10-CM

## 2024-12-04 DIAGNOSIS — I95.1 ORTHOSTATIC HYPOTENSION: ICD-10-CM

## 2024-12-04 PROCEDURE — 99309 SBSQ NF CARE MODERATE MDM 30: CPT | Performed by: STUDENT IN AN ORGANIZED HEALTH CARE EDUCATION/TRAINING PROGRAM

## 2024-12-05 NOTE — PROGRESS NOTES
[facility]  Facility: [facility]    PROGRESS NOTE  Nursing Home Place of Service: nursing home place of service: POS 32 Unskilled- No Part A Coverage    NAME: Tuan Remy  : 1955 AGE: 69 y.o. SEX: male MRN: [id number]  DATE OF ENCOUNTER: 2024    Assessment and Plan     Problem List Items Addressed This Visit       Hyperlipidemia    lipid panel (23)  cholesterol-109  triglycerides-61  LDL-53  HDL-44  Being treated with a high-intensity statin d/t prior history of a NSTEMI, PVD, and CAD  Continue atorvastatin 80 mg po daily, appears to be tolerating well  Goal LDL <70 per Cardiology         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HCC)    Following Heme/Onc at [facility], recent visit 2024  Was treated in  with 2-CDA  Completed 2 years of zoledronic acid  Considered stable per Heme/Onc, no evidence of recurrent disease  No apparent acute/associated symptoms presently         Orthostatic hypotension    Recent fall as per note in 2024 - Likely a multifactorial issue related to diabetes/hypoglycemia and poor PO intake/dehydration at the time  Denies any recent notable orthostasis/worsening symptoms  Appears he used to be on midodrine in the past. Monitor for need.  Monitor vitals. BP recently generally stable around 100s-130s systolic  Encourage fall precautions, appropriate DME use, slow positional changes, adequate PO hydration  Following Cardiology outpatient          Peripheral vascular disease, unspecified (HCC)    Noted history  No apparent acute/associated concerns, seems stable  Monitor skin for breakdown, wounds  Continue aspirin, statin         Type 1 diabetes mellitus with hyperglycemia ([facility])      Lab Results   Component Value Date    HGBA1C 7.1 (H) 2024     Monitor A1c routinely  Following Endocrinology, recent visit 2024, next Dec 2024  Monitor glucose - still somewhat variable, but overall elevated lately, ranging 100s-300s generally. Fasting sugars  "generally variable in the low-mid 100s  Avoid hypoglycemia - had some previously, seems improved with recent regimen changes  Continue insulin with sliding scale  Continue novolog 10 units with breakfast/lunch, 6 units with dinner  Continue lantus 20 u daily (recently decreased due to some hypoglycemia concern)  Adjust/titrate regimen as appropriate if sugars persistently elevated above goal  Follow up with Endocrine as scheduled  Follow up with Ophthalmology/Podiatry as appropriate           Anxiety    Noted hx  Mood stable, good spirits  Monitor off anxiolytic meds  Supportive care  Psych following at facility         Coronary artery disease without angina pectoris    Lexiscan stress test:  1. Infarct involving the left circumflex territory/lateral wall with moderate  sonam-infarct ischemia.  2. Normal left ventricle systolic function with ejection fraction of 55%.     Previous Cardiology visit 1/26/23:  \"NSTEMI as noted previously. Currently doing well.  He will continue aggressive risk modification.  He will continue his current medications.  No further cardiac testing is indicated at this time. \"     Recent Cardiology visit 2/23/24:  To continue aspirin, statin. Avoided beta blocker/ACE-I with hx of orthostatic hypotension.     Next Cardio visit Feb 2025     No acute cardiac complaints, seems stable  Continue aspirin, statin         History of DVT (deep vein thrombosis)    Follows Heme/Onc at [facility]  Hx of unprovoked VTE  To continue Eliquis for AC (lifelong per Hematology recommendation)         CKD (chronic kidney disease) stage 2, GFR 60-89 ml/min    Lab Results   Component Value Date    EGFR 56 (L) 09/04/2024    EGFR 78 07/01/2024    EGFR 76 04/10/2024    CREATININE 1.37 (H) 09/04/2024    CREATININE 1.04 07/01/2024    CREATININE 1.06 04/10/2024     Monitor renal function on routine labs - recently in ER was noted to have elevation in Cr from his baseline, likely was related to dehydration at the time. Appears " "improved to baseline on recheck  Avoid nephrotoxins, NSAIDs as able  Encourage PO hydration, respecting volume status           Hx of non-ST elevation myocardial infarction (NSTEMI)    See plan under CAD          Mild cognitive disorder    Noted hx of cognitive impairment  Patient Aox3 and seems to be a fair historian though a bit forgetful and with some possibly mild paranoia in his statements, mentation seems stable  May require redirection and reorientation  Encourage participation with group activities  Encourage staying mentally active with word searches, crossword puzzles, and suduko   Avoid deliriogenic medications   Continue 24/7 supportive care at [facility]  Psych team following at facility         Primary insomnia    Stable  Continue melatonin  Recently started on quetiapine as per Psych  Trazodone was tried and weaned off as seemed ineffective  Supportive care, sleep hygiene         Benign prostatic hyperplasia with nocturia    BPH with LUTS  Following Urology outpatient, recent visit Oct 2024  Started on flomax 0.4mg in Jan 2024  Monitor PSA routinely - WNL though some mild uptrend noted, continue to monitor, as per Urology check yearly before next Urology visit  Seems symptomatically stable  CT abdomen pelvis completed on 09/04 during ED evaluation  Small enhancing nodule at the bladder base in the region of the trigone unchanged from prior         Delusion (HCC) - Primary    Patient with some evident delusions since at least several months mainly focused on the idea that someone I.e. \"the \" or \"the \" at facility is telling him he can or cannot do certain things such as leave his room at times or else he will not get his food  He otherwise seems to be a fair historian and easily redirectable and does not seem to hold any other delusions or note any hallucinations or other concerning psychiatric symptoms.  Psych hx mainly significant for anxiety/depression and insomnia  The delusion issue seems " stable recently, perhaps a bit improved as per nursing  Psychiatry following at facility  Presently is on quetiapine 25mg qHS recently started and being gradually titrated as per Psych recommendation  Continue to monitor for acute change  Supportive care  Consider further workup/intervention in conjunction with Psych team if issue persists/worsens.  Neurology consult has been placed, he is scheduled to see Neuro in early 2025         Colonoscopy refused    12/4/24 discussed routine screening colonoscopy  Patient thinks he has had a colonoscopy in the past and chart mentions hx of colonoscopy though no record/details seem available  At this time patient declines to have further colon cancer screening citing lack of interest and lack of GI symptoms                      Chief Complaint     Follow up 30-60 day    History of Present Illness     Tuan Remy is a 69 y.o. male who was seen today for follow up. PMH including type 1 DM, orthostatic hypotension, peripheral vascular disease, atherosclerotic heart disease, hyperlipidemia, LCH, hyperlipidemia      Patient seen and examined in room  Others present: none  Patient laying in bed, able to reposition independently  Appears comfortable, awake, alert, oriented to situation, able to converse appropriately  Polite, Aox3 though a bit forgetful of details, in good spirits, appearing similar to my prior visit in terms of mentation and mood. Notes he feels well overall with no acute concerns at present. Feels his mood is fine, denies any anxiety/depression. No acute pain anywhere. Good appetite, no acute swallowing concerns. Reports regular BM, no abd pain/N/V/C/D. Breathing fine, no orthopnea. No CP/palpitations; again notes chronic intermittent orthostatic lightheadedness, mild/stable, he is aware to get up slowly. No acute cardiopulmonary, abdominal, or urinary symptoms; see ROS for more details. He does not feel sick or confused. Gets around mainly with walker and feels  "steady with this.  No further questions or acute concerns identified.  No further acute concerns per nursing. His paranoid statements at times seem to be a bit improved from prior per nursing. He did mention to me again on discussion today that \"the part-time \" sometimes tells him he will not be fed or given his food if he leaves his room, this is similar to statements he has made in the past, he does not seem to exhibit any other notable paranoid or psychotic symptoms than this at present, he is being followed by Psychiatry at facility.     Lab Review:   9/14/23: A1c 7.5 (improved from 7.9 in July 2023)  12/28/23: PSA WNL (2.45)  1/29/24: CBC with Hb 11.8; CMP generally non-actionable, glu 50, eGFR 75; last TSH WNL from April 2023  4/10/24: CBC with Hb 11.4; BMP generally stable/non-actionable, eGFR 76; A1c 7.7  6/24/24: PSA WNL (3.42)  7/1/24: CBC with Hb 12.0; BMP generally stable/non-actionable, eGFR 78; LDL 52; TSH WNL; A1c 7.1  7/24/24: PSA 3.52 (WNL)  9/4/24: CMP with Cr 1.37, GFR 56 (baseline 60s-70s); CBC generally stable/non-actionable, Hb 12.3; blood cultures NGTD; urine culture with no notable growth  10/30/24: BMP generally stable/non-actionable, Cr 1.05, eGFR 77; CBC with Hb 11.7; A1c 8.4      Lab Results   Component Value Date    HGBA1C 7.1 (H) 07/01/2024     Lab Results   Component Value Date    TSH 2.25 07/01/2024     No results found for: \"EERZWSOQ35\"  Lab Results   Component Value Date    FERRITIN 339 (H) 04/28/2020     No results found for: \"CHOLESTEROL\"  No results found for: \"HDL\"  No results found for: \"TRIG\"  No results found for: \"NONHDLC\"  No results found for: \"LDLCALC\"        CTA abdomen pelvis w wo contrast  Result Date: 9/4/2024  1. No acute mass nor inflammatory change in the abdomen. Solid organs grossly stable. No evidence of abscess given history 2. Chronic circumferential distal esophageal wall thickening. Please correlate for chronic reflux esophagitis. 3. Chronic marked " rectal fecal retention/possible impaction. Mass effect on the bladder. 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 4. Small enhancing nodule at the bladder base in the region of the trigone unchanged from prior see above comments.     CT head wo contrast  Result Date: 9/4/2024  1.  No acute intracranial findings: No large acute transcortical infarct or intracranial hemorrhage. 2.  Mild white matter changes, which are nonspecific commonly seen with chronic microvascular ischemic change.    XR chest portable  Result Date: 9/4/2024  Impression: No acute pathology visualized.           Echo Oct 2022: EF 60-65, normal diastolic function       The following portions of the patient's history were reviewed and updated as appropriate: allergies, current medications, past family history, past medical history, past social history, past surgical history and problem list.    Review of Systems     Review of Systems   Constitutional:  Negative for appetite change, chills, diaphoresis and fever.   HENT:  Negative for drooling, ear pain, hearing loss, rhinorrhea, sore throat and trouble swallowing.    Eyes:  Negative for pain, discharge, redness, itching and visual disturbance.   Respiratory:  Negative for cough, chest tightness, shortness of breath and wheezing.    Cardiovascular:  Negative for chest pain, palpitations and leg swelling (mild, baseline).   Gastrointestinal:  Negative for abdominal pain, blood in stool, constipation, diarrhea, nausea and vomiting.   Genitourinary:  Negative for difficulty urinating, dysuria, flank pain and hematuria.   Musculoskeletal:  Positive for gait problem. Negative for arthralgias, back pain and neck pain.   Skin:  Negative for color change.   Neurological:  Positive for tremors (stable). Negative for dizziness, facial asymmetry, speech difficulty, weakness, light-headedness and headaches.   Psychiatric/Behavioral:  Negative for agitation, behavioral problems and confusion. The patient is not  nervous/anxious and is not hyperactive.        Active Problem List     Patient Active Problem List   Diagnosis    Ambulatory dysfunction    Blurry vision, right eye    Hyperlipidemia    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HCC)    Orthostatic hypotension    Peripheral vascular disease, unspecified (HCC)    Type 1 diabetes mellitus with hyperglycemia (HCC)    Sinus tachycardia    Anxiety    Reactive depression    Coronary artery disease without angina pectoris    Acute kidney injury superimposed on chronic kidney disease  (HCC)    History of DVT (deep vein thrombosis)    Hyperlipidemia due to type 1 diabetes mellitus  (HCC)    Other constipation    CKD (chronic kidney disease) stage 2, GFR 60-89 ml/min    Urinary frequency    Hx of non-ST elevation myocardial infarction (NSTEMI)    Mild cognitive disorder    Primary insomnia    Benign prostatic hyperplasia with nocturia    Advance care planning    Falls    Acute confusion    Delusion (HCC)    Colonoscopy refused       Objective     Vital Signs:     BP: 129/80 mmHg  11/29/2024 18:14 Temp:98.5 °F  11/12/2024 02:57 Pulse:80 bpm  11/29/2024 09:44 Weight:198.3 Lbs  11/18/2024 11:32   Resp:18 Breaths/min  11/11/2024 07:57 BS:215 mg/dL  12/4/2024 18:05 O2:98 %  11/11/2024 07:57 Pain:0  12/4/2024 16:10       Physical Exam  Vitals reviewed.   Constitutional:       General: He is not in acute distress.     Appearance: He is not toxic-appearing or diaphoretic.   HENT:      Head: Normocephalic and atraumatic.      Right Ear: External ear normal.      Left Ear: External ear normal.      Nose: Nose normal. No rhinorrhea.      Mouth/Throat:      Mouth: Mucous membranes are moist.      Pharynx: Oropharynx is clear. No oropharyngeal exudate or posterior oropharyngeal erythema.   Eyes:      General: No scleral icterus.        Right eye: No discharge.         Left eye: No discharge.      Extraocular Movements: Extraocular movements intact.      Conjunctiva/sclera: Conjunctivae normal.       Pupils: Pupils are equal, round, and reactive to light.   Cardiovascular:      Rate and Rhythm: Normal rate and regular rhythm.   Pulmonary:      Effort: Pulmonary effort is normal. No respiratory distress.      Breath sounds: No wheezing or rales.   Abdominal:      General: Bowel sounds are normal.      Palpations: Abdomen is soft.      Tenderness: There is no abdominal tenderness. There is no guarding.   Musculoskeletal:         General: No tenderness.      Cervical back: No rigidity.      Comments: Trace bilateral LE edema   Skin:     General: Skin is warm and dry.      Coloration: Skin is not jaundiced.   Neurological:      General: No focal deficit present.      Mental Status: He is alert and oriented to person, place, and time. Mental status is at baseline.   Psychiatric:         Mood and Affect: Mood normal.         Behavior: Behavior normal.         Thought Content: Thought content normal.         Judgment: Judgment normal.         Pertinent Laboratory/Diagnostic Studies:  Laboratory and Imaging studies reviewed. Full report in the paper chart.     Current Medications   Medications reviewed and updated in facility chart.      -Total time spent on this encounter today including documentation and workup review, face to face time, history and exam, and documentation/orders, was approximately 40 minutes.  -This note will be copied to [facility] EMR where vitals and medication orders are placed.    Ryan Chester D.O.  Geriatric Medicine  12/4/2024 7:50 PM

## 2024-12-05 NOTE — ASSESSMENT & PLAN NOTE
Recent fall as per note in Sept 2024 - Likely a multifactorial issue related to diabetes/hypoglycemia and poor PO intake/dehydration at the time  Denies any recent notable orthostasis/worsening symptoms  Appears he used to be on midodrine in the past. Monitor for need.  Monitor vitals. BP recently generally stable around 100s-130s systolic  Encourage fall precautions, appropriate DME use, slow positional changes, adequate PO hydration  Following Cardiology outpatient

## 2024-12-05 NOTE — ASSESSMENT & PLAN NOTE
Stable  Continue melatonin  Recently started on quetiapine as per Psych  Trazodone was tried and weaned off as seemed ineffective  Supportive care, sleep hygiene

## 2024-12-05 NOTE — ASSESSMENT & PLAN NOTE
Following Heme/Onc at [facility], recent visit Feb 2024  Was treated in 2013 with 2-CDA  Completed 2 years of zoledronic acid  Considered stable per Heme/Onc, no evidence of recurrent disease  No apparent acute/associated symptoms presently

## 2024-12-05 NOTE — ASSESSMENT & PLAN NOTE
BPH with LUTS  Following Urology outpatient, recent visit Oct 2024  Started on flomax 0.4mg in Jan 2024  Monitor PSA routinely - WNL though some mild uptrend noted, continue to monitor, as per Urology check yearly before next Urology visit  Seems symptomatically stable  CT abdomen pelvis completed on 09/04 during ED evaluation  Small enhancing nodule at the bladder base in the region of the trigone unchanged from prior

## 2024-12-05 NOTE — ASSESSMENT & PLAN NOTE
Noted hx  Mood stable, good spirits  Monitor off anxiolytic meds  Supportive care  Psych following at facility

## 2024-12-05 NOTE — ASSESSMENT & PLAN NOTE
Lab Results   Component Value Date    HGBA1C 7.1 (H) 07/01/2024     Monitor A1c routinely  Following Endocrinology, recent visit July 2024, next Dec 2024  Monitor glucose - still somewhat variable, but overall elevated lately, ranging 100s-300s generally. Fasting sugars generally variable in the low-mid 100s  Avoid hypoglycemia - had some previously, seems improved with recent regimen changes  Continue insulin with sliding scale  Continue novolog 10 units with breakfast/lunch, 6 units with dinner  Continue lantus 20 u daily (recently decreased due to some hypoglycemia concern)  Adjust/titrate regimen as appropriate if sugars persistently elevated above goal  Follow up with Endocrine as scheduled  Follow up with Ophthalmology/Podiatry as appropriate

## 2024-12-05 NOTE — ASSESSMENT & PLAN NOTE
Lab Results   Component Value Date    EGFR 56 (L) 09/04/2024    EGFR 78 07/01/2024    EGFR 76 04/10/2024    CREATININE 1.37 (H) 09/04/2024    CREATININE 1.04 07/01/2024    CREATININE 1.06 04/10/2024     Monitor renal function on routine labs - recently in ER was noted to have elevation in Cr from his baseline, likely was related to dehydration at the time. Appears improved to baseline on recheck  Avoid nephrotoxins, NSAIDs as able  Encourage PO hydration, respecting volume status

## 2024-12-05 NOTE — ASSESSMENT & PLAN NOTE
12/4/24 discussed routine screening colonoscopy  Patient thinks he has had a colonoscopy in the past and chart mentions hx of colonoscopy though no record/details seem available  At this time patient declines to have further colon cancer screening citing lack of interest and lack of GI symptoms

## 2024-12-05 NOTE — ASSESSMENT & PLAN NOTE
Noted history  No apparent acute/associated concerns, seems stable  Monitor skin for breakdown, wounds  Continue aspirin, statin

## 2024-12-05 NOTE — ASSESSMENT & PLAN NOTE
"Patient with some evident delusions since at least several months mainly focused on the idea that someone I.e. \"the \" or \"the \" at facility is telling him he can or cannot do certain things such as leave his room at times or else he will not get his food  He otherwise seems to be a fair historian and easily redirectable and does not seem to hold any other delusions or note any hallucinations or other concerning psychiatric symptoms.  Psych hx mainly significant for anxiety/depression and insomnia  The delusion issue seems stable recently, perhaps a bit improved as per nursing  Psychiatry following at facility  Presently is on quetiapine 25mg qHS recently started and being gradually titrated as per Psych recommendation  Continue to monitor for acute change  Supportive care  Consider further workup/intervention in conjunction with Psych team if issue persists/worsens.  Neurology consult has been placed, he is scheduled to see Neuro in early 2025  "

## 2024-12-05 NOTE — ASSESSMENT & PLAN NOTE
"Lexiscan stress test:  1. Infarct involving the left circumflex territory/lateral wall with moderate  sonam-infarct ischemia.  2. Normal left ventricle systolic function with ejection fraction of 55%.     Previous Cardiology visit 1/26/23:  \"NSTEMI as noted previously. Currently doing well.  He will continue aggressive risk modification.  He will continue his current medications.  No further cardiac testing is indicated at this time. \"     Recent Cardiology visit 2/23/24:  To continue aspirin, statin. Avoided beta blocker/ACE-I with hx of orthostatic hypotension.     Next Cardio visit Feb 2025     No acute cardiac complaints, seems stable  Continue aspirin, statin  "

## 2024-12-26 ENCOUNTER — NURSING HOME VISIT (OUTPATIENT)
Dept: GERIATRICS | Facility: OTHER | Age: 69
End: 2024-12-26
Payer: MEDICARE

## 2024-12-26 DIAGNOSIS — E10.65 TYPE 1 DIABETES MELLITUS WITH HYPERGLYCEMIA (HCC): ICD-10-CM

## 2024-12-26 DIAGNOSIS — R13.19 OTHER DYSPHAGIA: ICD-10-CM

## 2024-12-26 DIAGNOSIS — B34.9 VIRAL INFECTION: Primary | ICD-10-CM

## 2024-12-26 PROCEDURE — 99308 SBSQ NF CARE LOW MDM 20: CPT | Performed by: STUDENT IN AN ORGANIZED HEALTH CARE EDUCATION/TRAINING PROGRAM

## 2024-12-27 PROBLEM — B34.9 VIRAL INFECTION: Status: ACTIVE | Noted: 2024-12-27

## 2024-12-27 PROBLEM — R13.19 OTHER DYSPHAGIA: Status: ACTIVE | Noted: 2024-12-27

## 2024-12-27 NOTE — ASSESSMENT & PLAN NOTE
Patient endorses dysphagia he estimates since at least several days perhaps over a week. He has some trouble describing it and believes it is mainly associated with the recent sore throat he has been experiencing since 1-2 days. He thinks it makes it a bit harder for him to swallow solids/liquids/pills due to some discomfort on swallowing but denies having choked on anything recently.  On exam no appreciable oropharyngeal redness/exudates/thrush/lesions  Speech consult ordered to eval as well in case the dysphagia is a different issue than the suspected viral illness presently  Aspiration precautions  Can use PRN lozenges for sore throat for now  CENTOR criteria score -1 - quite unlikely to be Strep infection  Continue to monitor

## 2024-12-27 NOTE — ASSESSMENT & PLAN NOTE
Lab Results   Component Value Date    HGBA1C 7.1 (H) 07/01/2024     Monitor A1c routinely  Monitor glucose - somewhat variable/labile, ranging 100s-300s generally. Fasting sugars generally variable in the low 100s - 200s  Avoid hypoglycemia - noted rare intermittent hypoglycemia without clear recent pattern but overall less so than prior it seems  Continue insulin aspart with sliding scale TIDAC+HS  Continue insulin aspart 10 units with breakfast/lunch, 6 units with dinner  Continue lantus 20 u daily (recently decreased due to some hypoglycemia concern)  Adjust/titrate regimen as appropriate if sugars persistently elevated above goal  Following Endocrinology, recent visit Dec 2024 - no regimen changes recommended  Follow up with Ophthalmology/Podiatry as appropriate

## 2024-12-27 NOTE — PROGRESS NOTES
[facility]  Facility: [facility]    PROGRESS NOTE  Nursing Home Place of Service: nursing home place of service: POS 32 Unskilled- No Part A Coverage    NAME: Taun Remy  : 1955 AGE: 69 y.o. SEX: male MRN: [id number]  DATE OF ENCOUNTER: 2024    Assessment and Plan     Problem List Items Addressed This Visit       Type 1 diabetes mellitus with hyperglycemia (HCC)      Lab Results   Component Value Date    HGBA1C 7.1 (H) 2024     Monitor A1c routinely  Monitor glucose - somewhat variable/labile, ranging 100s-300s generally. Fasting sugars generally variable in the low 100s - 200s  Avoid hypoglycemia - noted rare intermittent hypoglycemia without clear recent pattern but overall less so than prior it seems  Continue insulin aspart with sliding scale TIDAC+HS  Continue insulin aspart 10 units with breakfast/lunch, 6 units with dinner  Continue lantus 20 u daily (recently decreased due to some hypoglycemia concern)  Adjust/titrate regimen as appropriate if sugars persistently elevated above goal  Following Endocrinology, recent visit Dec 2024 - no regimen changes recommended  Follow up with Ophthalmology/Podiatry as appropriate           Other dysphagia    Patient endorses dysphagia he estimates since at least several days perhaps over a week. He has some trouble describing it and believes it is mainly associated with the recent sore throat he has been experiencing since 1-2 days. He thinks it makes it a bit harder for him to swallow solids/liquids/pills due to some discomfort on swallowing but denies having choked on anything recently.  On exam no appreciable oropharyngeal redness/exudates/thrush/lesions  Speech consult ordered to eval as well in case the dysphagia is a different issue than the suspected viral illness presently  Aspiration precautions  Can use PRN lozenges for sore throat for now  CENTOR criteria score -1 - quite unlikely to be Strep infection  Continue to  monitor         Viral infection - Primary    Symptoms since 12/24/24 including: mostly dry intermittent cough, sore throat, mild stomach ache without tenderness. No other acute respiratory, urinary, or GI symptoms  Suspect a viral syndrome; several residents in building/on unit with viral gastroenteritis symptoms in recent days with respiratory viral panel generally having been negative; Mr. Remy does also endorse regularly visiting with his sister including last few days and may have picked up a viral URI/enteritis  He feels stable and does not feel he needs anything for the cough at present  Ordered respiratory viral panel - negative  Monitor respiratory status - stable on room air  Supportive care, encourage IS as appropriate  Isolation/precautions as per facility protocol  Ordered labs for tomorrow to eval for leukocytosis/infectious markers, metabolic derangements in setting of potential GI losses  Standing orders for symptoms including PRN zofran, downgrading/advancement of diet as tolerated, for several days  Continue to monitor                            Chief Complaint     Follow up acute - cough, viral symptoms    History of Present Illness     Tuan Remy is a 69 y.o. male who was seen today for follow up. PMH including type 1 DM, orthostatic hypotension, peripheral vascular disease, atherosclerotic heart disease, hyperlipidemia, LCH, hyperlipidemia     Notified by nursing patient with symptoms since 12/24 including dry cough, asked me to evaluate. Of note several patients at facility with viral enteritis symptoms in last few days.     Patient seen and examined in room  Others present: none  Patient laying in bed, able to reposition independently  Appears comfortable, awake, alert, oriented to situation, able to converse appropriately  Polite, Aox3 though a bit forgetful of details, in good spirits, appearing similar to my prior visit in terms of mentation. Notes he feels OK overall, bit sick/under  the weather since 1-2 days. Notes main symptoms including intermittent cough (mostly dry, some phlegm, no cough noted during exam), and also he notes some mildly sore throat. No acute pain anywhere otherwise. Endorses he retains a good appetite. He does feel he has had some dysphagia lately, he thinks for longer than the present viral syndrome, he thinks some difficulty swallowing since several days perhaps over a week, denies any acute choking, amenable to seeing Speech Therapy and he does think the issue may be related to his sore throat presently. Denies any acute GI symptoms, endorses regular BM, last BM 1-2 days ago normal, does endorse a mild stomach ache since 1-2 days but no N/V/C/D. Breathing fine, no orthopnea/SOB. No CP/palpitations; again notes chronic intermittent orthostatic lightheadedness, mild/stable, he is aware to get up slowly. No acute cardiac, abdominal, or urinary symptoms; see ROS for more details. He does not feel feverish or confused. Gets around mainly with walker and feels steady with this.  He did not make any paranoid/delusional statements today during exam.  No further questions or acute concerns identified.  No further acute concerns per nursing.     Lab Review:   9/14/23: A1c 7.5 (improved from 7.9 in July 2023)  12/28/23: PSA WNL (2.45)  1/29/24: CBC with Hb 11.8; CMP generally non-actionable, glu 50, eGFR 75; last TSH WNL from April 2023  4/10/24: CBC with Hb 11.4; BMP generally stable/non-actionable, eGFR 76; A1c 7.7  6/24/24: PSA WNL (3.42)  7/1/24: CBC with Hb 12.0; BMP generally stable/non-actionable, eGFR 78; LDL 52; TSH WNL; A1c 7.1  7/24/24: PSA 3.52 (WNL)  9/4/24: CMP with Cr 1.37, GFR 56 (baseline 60s-70s); CBC generally stable/non-actionable, Hb 12.3; blood cultures NGTD; urine culture with no notable growth  10/30/24: BMP generally stable/non-actionable, Cr 1.05, eGFR 77; CBC with Hb 11.7; A1c 8.4  12/20/24: vit D 30, B12 301  12/26/24: respiratory viral panel  "negative      Lab Results   Component Value Date    HGBA1C 7.1 (H) 07/01/2024     Lab Results   Component Value Date    TSH 2.25 07/01/2024     No results found for: \"APTNUUBQ44\"  Lab Results   Component Value Date    FERRITIN 339 (H) 04/28/2020     No results found for: \"CHOLESTEROL\"  No results found for: \"HDL\"  No results found for: \"TRIG\"  No results found for: \"NONHDLC\"  No results found for: \"LDLCALC\"        CTA abdomen pelvis w wo contrast  Result Date: 9/4/2024  1. No acute mass nor inflammatory change in the abdomen. Solid organs grossly stable. No evidence of abscess given history 2. Chronic circumferential distal esophageal wall thickening. Please correlate for chronic reflux esophagitis. 3. Chronic marked rectal fecal retention/possible impaction. Mass effect on the bladder. 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 4. Small enhancing nodule at the bladder base in the region of the trigone unchanged from prior see above comments.     CT head wo contrast  Result Date: 9/4/2024  1.  No acute intracranial findings: No large acute transcortical infarct or intracranial hemorrhage. 2.  Mild white matter changes, which are nonspecific commonly seen with chronic microvascular ischemic change.    XR chest portable  Result Date: 9/4/2024  Impression: No acute pathology visualized.           Echo Oct 2022: EF 60-65, normal diastolic function  EKG 12/20/24 \"sinus rhythm. Normal ECG. Intervals: HR: 75 bpm VA: 123 ms QRS: 101 ms QT: 383 ms RR: 800 ms QTC: 430 ms Axes: P: 34 Deg QRS: 15 Deg T: 37 Deg\"       The following portions of the patient's history were reviewed and updated as appropriate: allergies, current medications, past family history, past medical history, past social history, past surgical history and problem list.    Review of Systems     Review of Systems   Constitutional:  Negative for appetite change, chills, diaphoresis and fever.   HENT:  Positive for sore throat and trouble swallowing. Negative for " drooling, ear pain, hearing loss and rhinorrhea.    Eyes:  Negative for pain, discharge, redness, itching and visual disturbance.   Respiratory:  Positive for cough. Negative for chest tightness, shortness of breath and wheezing.    Cardiovascular:  Negative for chest pain, palpitations and leg swelling (mild, baseline).   Gastrointestinal:  Negative for abdominal pain, blood in stool, constipation, diarrhea, nausea and vomiting.   Genitourinary:  Negative for difficulty urinating, dysuria, flank pain and hematuria.   Musculoskeletal:  Positive for gait problem. Negative for arthralgias, back pain and neck pain.   Skin:  Negative for color change.   Neurological:  Positive for tremors (stable). Negative for dizziness, facial asymmetry, speech difficulty, weakness, light-headedness and headaches.   Psychiatric/Behavioral:  Negative for agitation, behavioral problems and confusion. The patient is not nervous/anxious and is not hyperactive.        Active Problem List     Patient Active Problem List   Diagnosis    Ambulatory dysfunction    Blurry vision, right eye    Hyperlipidemia    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HCC)    Orthostatic hypotension    Peripheral vascular disease, unspecified (HCC)    Type 1 diabetes mellitus with hyperglycemia (HCC)    Sinus tachycardia    Anxiety    Reactive depression    Coronary artery disease without angina pectoris    Acute kidney injury superimposed on chronic kidney disease  (HCC)    History of DVT (deep vein thrombosis)    Hyperlipidemia due to type 1 diabetes mellitus  (HCC)    Other constipation    CKD (chronic kidney disease) stage 2, GFR 60-89 ml/min    Urinary frequency    Hx of non-ST elevation myocardial infarction (NSTEMI)    Mild cognitive disorder    Primary insomnia    Benign prostatic hyperplasia with nocturia    Advance care planning    Falls    Acute confusion    Delusion (HCC)    Colonoscopy refused    Other dysphagia    Viral infection       Objective     Vital  Signs:     BP: 113/63 mmHg  12/26/2024 08:10 Temp:98.2 °F   Pulse:82 bpm   Weight:192 Lbs  12/8/2024 14:41   Resp:18 Breaths/min  12/26/2024 08:10 BS:128 mg/dL   O2:96 %  12/26/2024 08:10 Pain:0         Physical Exam  Vitals reviewed.   Constitutional:       General: He is not in acute distress.     Appearance: He is not toxic-appearing or diaphoretic.   HENT:      Head: Normocephalic and atraumatic.      Right Ear: External ear normal.      Left Ear: External ear normal.      Nose: Nose normal. No rhinorrhea.      Mouth/Throat:      Mouth: Mucous membranes are moist.      Pharynx: Oropharynx is clear. No oropharyngeal exudate or posterior oropharyngeal erythema.      Comments: Oropharynx appears clear without appreciable erythema nor exudate. No anterior neck lymphadenopathy or tenderness.  Eyes:      General: No scleral icterus.        Right eye: No discharge.         Left eye: No discharge.      Extraocular Movements: Extraocular movements intact.      Conjunctiva/sclera: Conjunctivae normal.      Pupils: Pupils are equal, round, and reactive to light.   Cardiovascular:      Rate and Rhythm: Normal rate and regular rhythm.   Pulmonary:      Effort: Pulmonary effort is normal. No respiratory distress.      Breath sounds: No wheezing or rales.   Abdominal:      General: Bowel sounds are normal.      Palpations: Abdomen is soft.      Tenderness: There is no abdominal tenderness. There is no guarding.   Musculoskeletal:         General: No tenderness.      Cervical back: No rigidity.      Comments: Trace bilateral LE edema   Skin:     General: Skin is warm and dry.      Coloration: Skin is not jaundiced.   Neurological:      General: No focal deficit present.      Mental Status: He is alert and oriented to person, place, and time. Mental status is at baseline.   Psychiatric:         Mood and Affect: Mood normal.         Behavior: Behavior normal.         Thought Content: Thought content normal.         Judgment:  Judgment normal.         Pertinent Laboratory/Diagnostic Studies:  Laboratory and Imaging studies reviewed. Full report in the paper chart.     Current Medications   Medications reviewed and updated in facility chart.      -Total time spent on this encounter today including documentation and workup review, face to face time, history and exam, and documentation/orders, was approximately 25 minutes.  -This note will be copied to [facility] EMR where vitals and medication orders are placed.    Ryan Chester D.O.  Geriatric Medicine  12/27/2024 1:22 PM

## 2024-12-27 NOTE — ASSESSMENT & PLAN NOTE
Symptoms since 12/24/24 including: mostly dry intermittent cough, sore throat, mild stomach ache without tenderness. No other acute respiratory, urinary, or GI symptoms  Suspect a viral syndrome; several residents in building/on unit with viral gastroenteritis symptoms in recent days with respiratory viral panel generally having been negative; Mr. Remy does also endorse regularly visiting with his sister including last few days and may have picked up a viral URI/enteritis  He feels stable and does not feel he needs anything for the cough at present  Ordered respiratory viral panel - negative  Monitor respiratory status - stable on room air  Supportive care, encourage IS as appropriate  Isolation/precautions as per facility protocol  Ordered labs for tomorrow to eval for leukocytosis/infectious markers, metabolic derangements in setting of potential GI losses  Standing orders for symptoms including PRN zofran, downgrading/advancement of diet as tolerated, for several days  Continue to monitor

## 2025-01-03 ENCOUNTER — NURSING HOME VISIT (OUTPATIENT)
Dept: GERIATRICS | Facility: OTHER | Age: 70
End: 2025-01-03
Payer: MEDICARE

## 2025-01-03 DIAGNOSIS — N18.2 CKD (CHRONIC KIDNEY DISEASE) STAGE 2, GFR 60-89 ML/MIN: ICD-10-CM

## 2025-01-03 DIAGNOSIS — I25.2 HX OF NON-ST ELEVATION MYOCARDIAL INFARCTION (NSTEMI): ICD-10-CM

## 2025-01-03 DIAGNOSIS — B34.9 VIRAL INFECTION: ICD-10-CM

## 2025-01-03 DIAGNOSIS — I73.9 PERIPHERAL VASCULAR DISEASE, UNSPECIFIED (HCC): ICD-10-CM

## 2025-01-03 DIAGNOSIS — F22 DELUSION (HCC): ICD-10-CM

## 2025-01-03 DIAGNOSIS — R13.19 OTHER DYSPHAGIA: ICD-10-CM

## 2025-01-03 DIAGNOSIS — E10.65 TYPE 1 DIABETES MELLITUS WITH HYPERGLYCEMIA (HCC): Primary | ICD-10-CM

## 2025-01-03 DIAGNOSIS — B37.0 ORAL THRUSH: ICD-10-CM

## 2025-01-03 DIAGNOSIS — C96.6 LANGERHAN'S CELL HISTIOCYTOSIS (HCC): ICD-10-CM

## 2025-01-03 DIAGNOSIS — F41.9 ANXIETY: ICD-10-CM

## 2025-01-03 DIAGNOSIS — F51.01 PRIMARY INSOMNIA: ICD-10-CM

## 2025-01-03 DIAGNOSIS — R35.1 BENIGN PROSTATIC HYPERPLASIA WITH NOCTURIA: ICD-10-CM

## 2025-01-03 DIAGNOSIS — I25.10 CORONARY ARTERY DISEASE INVOLVING NATIVE HEART WITHOUT ANGINA PECTORIS, UNSPECIFIED VESSEL OR LESION TYPE: ICD-10-CM

## 2025-01-03 DIAGNOSIS — N40.1 BENIGN PROSTATIC HYPERPLASIA WITH NOCTURIA: ICD-10-CM

## 2025-01-03 DIAGNOSIS — F09 MILD COGNITIVE DISORDER: ICD-10-CM

## 2025-01-03 DIAGNOSIS — Z86.718 HISTORY OF DVT (DEEP VEIN THROMBOSIS): ICD-10-CM

## 2025-01-03 DIAGNOSIS — E78.2 MIXED HYPERLIPIDEMIA: ICD-10-CM

## 2025-01-03 DIAGNOSIS — I95.1 ORTHOSTATIC HYPOTENSION: ICD-10-CM

## 2025-01-03 PROCEDURE — 99310 SBSQ NF CARE HIGH MDM 45: CPT | Performed by: STUDENT IN AN ORGANIZED HEALTH CARE EDUCATION/TRAINING PROGRAM

## 2025-01-05 PROBLEM — B37.0 ORAL THRUSH: Status: ACTIVE | Noted: 2025-01-05

## 2025-01-05 NOTE — ASSESSMENT & PLAN NOTE
"Patient with some evident delusions since at least several months mainly focused on the idea that someone I.e. \"the \" or \"the \" at facility is telling him he can or cannot do certain things such as leave his room at times or else he will not get his food  He otherwise seems to be a fair historian and easily redirectable and does not seem to hold any other major delusions or note any hallucinations or other concerning psychiatric symptoms.  Psych hx mainly significant for anxiety/depression and insomnia  The delusion issue seems stable recently, perhaps a bit improved as per nursing  Psychiatry following at facility  Presently is on quetiapine 37.5mg qHS recently started and being gradually titrated as per Psych recommendation  Continue to monitor for acute change  Supportive care  Consider further workup/intervention in conjunction with Psych team if issue persists/worsens.  Neurology consult has been placed, he is scheduled to see Neuro in early 2025  "

## 2025-01-05 NOTE — ASSESSMENT & PLAN NOTE
Symptoms since 12/24/24 including: mostly dry intermittent cough, sore throat, mild stomach ache without tenderness. No other acute respiratory, urinary, or GI symptoms  Suspect a viral syndrome; several residents in building/on unit with viral gastroenteritis symptoms in recent days with respiratory viral panel generally having been negative; Mr. Remy does also endorse regularly visiting with his sister and may have picked up a viral URI/enteritis  He feels stable and does not feel he needs anything for the cough at present  respiratory viral panel - negative  Monitor respiratory status - stable on room air  Supportive care, encourage IS as appropriate  Isolation/precautions as per facility protocol  Labs were overall stable/reassuring  Continue to monitor- seems improving/resolving. The sore throat component may be more related to thrush, see rest of plan.

## 2025-01-05 NOTE — PROGRESS NOTES
[facility]  Facility: [facility]    PROGRESS NOTE  Nursing Home Place of Service: nursing home place of service: POS 32 Unskilled- No Part A Coverage    NAME: Tuan Remy  : 1955 AGE: 69 y.o. SEX: male MRN: [id number]  DATE OF ENCOUNTER: 1/3/2025    Assessment and Plan     Problem List Items Addressed This Visit       Hyperlipidemia    lipid panel (23)  cholesterol-109  triglycerides-61  LDL-53  HDL-44  Being treated with a high-intensity statin d/t prior history of a NSTEMI, PVD, and CAD  Continue atorvastatin 80 mg po daily, appears to be tolerating well  Goal LDL <70 per Cardiology         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HCC)    Following Heme/Onc at [facility], recent visit 2024, next 2025  Was treated in  with 2-CDA  Completed 2 years of zoledronic acid  Considered stable per Heme/Onc, no evidence of recurrent disease  No apparent acute/associated symptoms presently         Orthostatic hypotension    Recent fall as per note in 2024 - Likely a multifactorial issue related to diabetes/hypoglycemia and poor PO intake/dehydration at the time  Denies any recent notable orthostasis/worsening symptoms  Appears he used to be on midodrine in the past. Monitor for need.  Monitor vitals. BP recently generally stable around 100s-130s systolic  Encourage fall precautions, appropriate DME use, slow positional changes, adequate PO hydration  Following Cardiology outpatient          Peripheral vascular disease, unspecified (HCC)    Noted history  No apparent acute/associated concerns, seems stable  Monitor skin for breakdown, wounds  Continue aspirin, statin         Type 1 diabetes mellitus with hyperglycemia (HCC) - Primary      Lab Results   Component Value Date    HGBA1C 7.1 (H) 2024     Monitor A1c routinely  Monitor glucose - overall reasonably controlled throughout day ranging low 100s-low 300s. Fasting sugars generally in low-mid 100s  Avoid hypoglycemia - has  "improved from prior  Continue insulin aspart with sliding scale TIDAC+HS  Continue insulin aspart 10 units with breakfast/lunch, 6 units with dinner  Continue lantus 20 u daily (recently decreased due to some hypoglycemia concern)  Adjust/titrate regimen as appropriate if sugars persistently elevated above goal  Following Endocrinology, recent visit Dec 2024 - no regimen changes recommended  Follow up with Ophthalmology/Podiatry as appropriate           Anxiety    Noted hx  Mood stable, good spirits  Monitor off anxiolytic meds  Supportive care  Psych following at facility         Coronary artery disease without angina pectoris    Lexiscan stress test:  1. Infarct involving the left circumflex territory/lateral wall with moderate  sonam-infarct ischemia.  2. Normal left ventricle systolic function with ejection fraction of 55%.     Previous Cardiology visit 1/26/23:  \"NSTEMI as noted previously. Currently doing well.  He will continue aggressive risk modification.  He will continue his current medications.  No further cardiac testing is indicated at this time. \"     Recent Cardiology visit 2/23/24:  To continue aspirin, statin. Avoided beta blocker/ACE-I with hx of orthostatic hypotension.     Next Cardio visit Feb 2025     No acute cardiac complaints, seems stable  Continue aspirin, statin         History of DVT (deep vein thrombosis)    Follows Heme/Onc at [facility]  Hx of unprovoked VTE  To continue Eliquis for AC (lifelong per Hematology recommendation)         CKD (chronic kidney disease) stage 2, GFR 60-89 ml/min    Lab Results   Component Value Date    EGFR 56 (L) 09/04/2024    EGFR 78 07/01/2024    EGFR 76 04/10/2024    CREATININE 1.37 (H) 09/04/2024    CREATININE 1.04 07/01/2024    CREATININE 1.06 04/10/2024     Monitor renal function on routine labs - stable in baseline range  Avoid nephrotoxins, NSAIDs as able  Encourage PO hydration, respecting volume status           Hx of non-ST elevation myocardial " "infarction (NSTEMI)    See plan under CAD          Mild cognitive disorder    Noted hx of cognitive impairment  Patient Aox3 and seems to be a fair historian though a bit forgetful and with some mild paranoia in his statements, mentation seems stable  May require redirection and reorientation  Encourage participation with group activities  Encourage staying mentally active with word searches, crossword puzzles, and suduko   Avoid deliriogenic medications   Continue 24/7 supportive care at LTC  Psych team following at facility         Primary insomnia    Stable  Continue melatonin  Recently started on quetiapine as per Psych  Trazodone was tried and weaned off as seemed ineffective  Supportive care, sleep hygiene         Benign prostatic hyperplasia with nocturia    BPH with LUTS  Following Urology outpatient, recent visit Oct 2024  Started on flomax 0.4mg in Jan 2024  Monitor PSA routinely - WNL though some mild uptrend noted, continue to monitor, as per Urology check yearly before next Urology visit  Seems symptomatically stable  CT abdomen pelvis completed on 09/04 during ED evaluation  Small enhancing nodule at the bladder base in the region of the trigone unchanged from prior         Delusion (HCC)    Patient with some evident delusions since at least several months mainly focused on the idea that someone I.e. \"the \" or \"the \" at facility is telling him he can or cannot do certain things such as leave his room at times or else he will not get his food  He otherwise seems to be a fair historian and easily redirectable and does not seem to hold any other major delusions or note any hallucinations or other concerning psychiatric symptoms.  Psych hx mainly significant for anxiety/depression and insomnia  The delusion issue seems stable recently, perhaps a bit improved as per nursing  Psychiatry following at facility  Presently is on quetiapine 37.5mg qHS recently started and being gradually titrated as " per Psych recommendation  Continue to monitor for acute change  Supportive care  Consider further workup/intervention in conjunction with Psych team if issue persists/worsens.  Neurology consult has been placed, he is scheduled to see Neuro in early 2025         Other dysphagia    Patient endorses dysphagia he estimates since at least several days perhaps over a week. He has some trouble describing it and believes it is mainly associated with the recent sore throat he has been experiencing since several days. He thinks it makes it a bit harder for him to swallow mainly dry solids due to some discomfort on swallowing but denies having choked on anything recently.  On exam no appreciable oropharyngeal redness however presently with some concern of thrush with white lesions in oropharynx  Speech consult has been ordered to eval as well in case the dysphagia is a different issue than the suspected viral illness presently  Aspiration precautions  Continue PRN cepacol lozenges for sore throat  Ordered nystatin swish/swallow regimen on suspicion of thrush etiology  Downgraded diet on precaution to mechanical soft for now  I did suggest ENT consult to patient however he declines to see specialist for this issue presently, preferring conservative measures as above for now.  Continue to monitor         Viral infection    Symptoms since 12/24/24 including: mostly dry intermittent cough, sore throat, mild stomach ache without tenderness. No other acute respiratory, urinary, or GI symptoms  Suspect a viral syndrome; several residents in building/on unit with viral gastroenteritis symptoms in recent days with respiratory viral panel generally having been negative; Mr. Remy does also endorse regularly visiting with his sister and may have picked up a viral URI/enteritis  He feels stable and does not feel he needs anything for the cough at present  respiratory viral panel - negative  Monitor respiratory status - stable on room  "air  Supportive care, encourage IS as appropriate  Isolation/precautions as per facility protocol  Labs were overall stable/reassuring  Continue to monitor- seems improving/resolving. The sore throat component may be more related to thrush, see rest of plan.             Oral thrush    See plan under dysphagia                          Chief Complaint     Follow up 60 day    History of Present Illness     Tuan Remy is a 69 y.o. male who was seen today for follow up. PMH including type 1 DM, orthostatic hypotension, peripheral vascular disease, atherosclerotic heart disease, hyperlipidemia, LCH, hyperlipidemia        Patient seen and examined in room  Others present: none  Patient laying in bed, able to reposition independently  Appears comfortable, awake, alert, oriented to situation, able to converse appropriately  Polite, Aox3 though a bit forgetful of details, in good spirits, appearing similar to my prior visit in terms of mentation. Notes he feels OK overall, overall better since feeling sick recently, cough much improved, sore throat a bit better but not resolved, some dysphagia ongoing with dry solids. No acute pain anywhere otherwise. Endorses he retains a good appetite. Denies any acute GI symptoms, endorses regular BM, no abd pain, no N/V/C/D. Breathing fine, no orthopnea/SOB. No CP/palpitations; again notes chronic intermittent orthostatic lightheadedness \"If I get up too fast\", mild/stable, he is aware to get up slowly. No acute cardiac, abdominal, or urinary symptoms; see ROS for more details. He does not feel feverish or confused. Gets around mainly with walker and feels steady with this. Feels his mood is fine, he does again make some paranoid/delusional statements stating that the \"part-time  Axel\" sometimes comes to his room and tells him he can not do certain things for example that he can not get more food if he does not finish his current meal.      No further questions or acute " "concerns identified.  No further acute concerns per nursing. Per nursing patient with stable occasional delusional statements like the above but no noted aggressive behaviors.     Lab Review:   9/14/23: A1c 7.5 (improved from 7.9 in July 2023)  12/28/23: PSA WNL (2.45)  1/29/24: CBC with Hb 11.8; CMP generally non-actionable, glu 50, eGFR 75; last TSH WNL from April 2023  4/10/24: CBC with Hb 11.4; BMP generally stable/non-actionable, eGFR 76; A1c 7.7  6/24/24: PSA WNL (3.42)  7/1/24: CBC with Hb 12.0; BMP generally stable/non-actionable, eGFR 78; LDL 52; TSH WNL; A1c 7.1  7/24/24: PSA 3.52 (WNL)  9/4/24: CMP with Cr 1.37, GFR 56 (baseline 60s-70s); CBC generally stable/non-actionable, Hb 12.3; blood cultures NGTD; urine culture with no notable growth  10/30/24: BMP generally stable/non-actionable, Cr 1.05, eGFR 77; CBC with Hb 11.7; A1c 8.4  12/20/24: vit D 30, B12 301  12/26/24: respiratory viral panel negative  12/27/24: CBC with Hb 11.9 (normocytic); BMP with Cr 1.19, eGFR 66      Lab Results   Component Value Date    HGBA1C 7.1 (H) 07/01/2024     Lab Results   Component Value Date    TSH 2.25 07/01/2024     No results found for: \"CSQMNPXE09\"  Lab Results   Component Value Date    FERRITIN 339 (H) 04/28/2020     No results found for: \"CHOLESTEROL\"  No results found for: \"HDL\"  No results found for: \"TRIG\"  No results found for: \"NONHDLC\"  No results found for: \"LDLCALC\"        CTA abdomen pelvis w wo contrast  Result Date: 9/4/2024  1. No acute mass nor inflammatory change in the abdomen. Solid organs grossly stable. No evidence of abscess given history 2. Chronic circumferential distal esophageal wall thickening. Please correlate for chronic reflux esophagitis. 3. Chronic marked rectal fecal retention/possible impaction. Mass effect on the bladder. 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 4. Small enhancing nodule at the bladder base in the region of the trigone unchanged from prior see above comments.     CT head " "wo contrast  Result Date: 9/4/2024  1.  No acute intracranial findings: No large acute transcortical infarct or intracranial hemorrhage. 2.  Mild white matter changes, which are nonspecific commonly seen with chronic microvascular ischemic change.    XR chest portable  Result Date: 9/4/2024  Impression: No acute pathology visualized.           Echo Oct 2022: EF 60-65, normal diastolic function  EKG 12/20/24 \"sinus rhythm. Normal ECG. Intervals: HR: 75 bpm NH: 123 ms QRS: 101 ms QT: 383 ms RR: 800 ms QTC: 430 ms Axes: P: 34 Deg QRS: 15 Deg T: 37 Deg\"       The following portions of the patient's history were reviewed and updated as appropriate: allergies, current medications, past family history, past medical history, past social history, past surgical history and problem list.    Review of Systems     Review of Systems   Constitutional:  Negative for appetite change, chills, diaphoresis and fever.   HENT:  Positive for sore throat and trouble swallowing. Negative for drooling, ear pain, hearing loss and rhinorrhea.    Eyes:  Negative for pain, discharge, redness, itching and visual disturbance.   Respiratory:  Negative for cough, chest tightness, shortness of breath and wheezing.    Cardiovascular:  Negative for chest pain, palpitations and leg swelling (mild, baseline).   Gastrointestinal:  Negative for abdominal pain, blood in stool, constipation, diarrhea, nausea and vomiting.   Genitourinary:  Negative for difficulty urinating, dysuria, flank pain and hematuria.   Musculoskeletal:  Positive for gait problem. Negative for arthralgias, back pain and neck pain.   Skin:  Negative for color change.   Neurological:  Positive for tremors (stable). Negative for dizziness, facial asymmetry, speech difficulty, weakness, light-headedness and headaches.   Psychiatric/Behavioral:  Negative for agitation, behavioral problems and confusion. The patient is not nervous/anxious and is not hyperactive.        Active Problem List "     Patient Active Problem List   Diagnosis    Ambulatory dysfunction    Blurry vision, right eye    Hyperlipidemia    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HCC)    Orthostatic hypotension    Peripheral vascular disease, unspecified (HCC)    Type 1 diabetes mellitus with hyperglycemia (HCC)    Sinus tachycardia    Anxiety    Reactive depression    Coronary artery disease without angina pectoris    Acute kidney injury superimposed on chronic kidney disease  (HCC)    History of DVT (deep vein thrombosis)    Hyperlipidemia due to type 1 diabetes mellitus  (HCC)    Other constipation    CKD (chronic kidney disease) stage 2, GFR 60-89 ml/min    Urinary frequency    Hx of non-ST elevation myocardial infarction (NSTEMI)    Mild cognitive disorder    Primary insomnia    Benign prostatic hyperplasia with nocturia    Advance care planning    Falls    Acute confusion    Delusion (HCC)    Colonoscopy refused    Other dysphagia    Viral infection    Oral thrush       Objective     Vital Signs:     BP: 126/68 mmHg  1/3/2025 16:45 Temp:98.7 °F   Pulse:86 bpm  1/3/2025 08:36 Weight:188.9 Lbs  1/2/2025 02:31   Resp:18 Breaths/min  1/3/2025 02:14 BS:201 mg/dL   O2:96 %  1/2/2025 14:25 Pain:0         Physical Exam  Vitals reviewed.   Constitutional:       General: He is not in acute distress.     Appearance: He is not toxic-appearing or diaphoretic.   HENT:      Head: Normocephalic and atraumatic.      Right Ear: External ear normal.      Left Ear: External ear normal.      Nose: Nose normal. No rhinorrhea.      Mouth/Throat:      Mouth: Mucous membranes are dry.      Pharynx: Oropharynx is clear. No oropharyngeal exudate or posterior oropharyngeal erythema.      Comments: Oropharynx without erythema however today does appear to have scant whitish patches over tongue and inner cheeks. No anterior neck lymphadenopathy or tenderness.  Eyes:      General: No scleral icterus.        Right eye: No discharge.         Left eye: No discharge.       Extraocular Movements: Extraocular movements intact.      Conjunctiva/sclera: Conjunctivae normal.      Pupils: Pupils are equal, round, and reactive to light.   Cardiovascular:      Rate and Rhythm: Normal rate and regular rhythm.   Pulmonary:      Effort: Pulmonary effort is normal. No respiratory distress.      Breath sounds: No wheezing or rales.   Abdominal:      General: Bowel sounds are normal.      Palpations: Abdomen is soft.      Tenderness: There is no abdominal tenderness. There is no guarding.   Musculoskeletal:         General: No tenderness.      Cervical back: No rigidity.      Comments: Trace bilateral LE edema   Skin:     General: Skin is warm and dry.      Coloration: Skin is not jaundiced.   Neurological:      General: No focal deficit present.      Mental Status: He is alert and oriented to person, place, and time. Mental status is at baseline.   Psychiatric:         Mood and Affect: Mood normal.         Behavior: Behavior normal.         Thought Content: Thought content normal.         Judgment: Judgment normal.         Pertinent Laboratory/Diagnostic Studies:  Laboratory and Imaging studies reviewed. Full report in the paper chart.     Current Medications   Medications reviewed and updated in facility chart.      -Total time spent on this encounter today including documentation and workup review, face to face time, history and exam, and documentation/orders, was approximately 50 minutes.  -This note will be copied to [facility] EMR where vitals and medication orders are placed.    Ryan Chester D.O.  Geriatric Medicine  1/5/2025 12:15 PM

## 2025-01-05 NOTE — ASSESSMENT & PLAN NOTE
Noted hx of cognitive impairment  Patient Aox3 and seems to be a fair historian though a bit forgetful and with some mild paranoia in his statements, mentation seems stable  May require redirection and reorientation  Encourage participation with group activities  Encourage staying mentally active with word searches, crossword puzzles, and suduko   Avoid deliriogenic medications   Continue 24/7 supportive care at LTC  Psych team following at facility

## 2025-01-05 NOTE — ASSESSMENT & PLAN NOTE
Patient endorses dysphagia he estimates since at least several days perhaps over a week. He has some trouble describing it and believes it is mainly associated with the recent sore throat he has been experiencing since several days. He thinks it makes it a bit harder for him to swallow mainly dry solids due to some discomfort on swallowing but denies having choked on anything recently.  On exam no appreciable oropharyngeal redness however presently with some concern of thrush with white lesions in oropharynx  Speech consult has been ordered to eval as well in case the dysphagia is a different issue than the suspected viral illness presently  Aspiration precautions  Continue PRN cepacol lozenges for sore throat  Ordered nystatin swish/swallow regimen on suspicion of thrush etiology  Downgraded diet on precaution to mechanical soft for now  I did suggest ENT consult to patient however he declines to see specialist for this issue presently, preferring conservative measures as above for now.  Continue to monitor

## 2025-01-05 NOTE — ASSESSMENT & PLAN NOTE
Lab Results   Component Value Date    EGFR 56 (L) 09/04/2024    EGFR 78 07/01/2024    EGFR 76 04/10/2024    CREATININE 1.37 (H) 09/04/2024    CREATININE 1.04 07/01/2024    CREATININE 1.06 04/10/2024     Monitor renal function on routine labs - stable in baseline range  Avoid nephrotoxins, NSAIDs as able  Encourage PO hydration, respecting volume status

## 2025-01-05 NOTE — ASSESSMENT & PLAN NOTE
Lab Results   Component Value Date    HGBA1C 7.1 (H) 07/01/2024     Monitor A1c routinely  Monitor glucose - overall reasonably controlled throughout day ranging low 100s-low 300s. Fasting sugars generally in low-mid 100s  Avoid hypoglycemia - has improved from prior  Continue insulin aspart with sliding scale TIDAC+HS  Continue insulin aspart 10 units with breakfast/lunch, 6 units with dinner  Continue lantus 20 u daily (recently decreased due to some hypoglycemia concern)  Adjust/titrate regimen as appropriate if sugars persistently elevated above goal  Following Endocrinology, recent visit Dec 2024 - no regimen changes recommended  Follow up with Ophthalmology/Podiatry as appropriate

## 2025-01-05 NOTE — ASSESSMENT & PLAN NOTE
Following Heme/Onc at [facility], recent visit Feb 2024, next Feb 2025  Was treated in 2013 with 2-CDA  Completed 2 years of zoledronic acid  Considered stable per Heme/Onc, no evidence of recurrent disease  No apparent acute/associated symptoms presently

## 2025-02-03 ENCOUNTER — NURSING HOME VISIT (OUTPATIENT)
Dept: GERIATRICS | Facility: OTHER | Age: 70
End: 2025-02-03
Payer: MEDICARE

## 2025-02-03 DIAGNOSIS — N40.1 BENIGN PROSTATIC HYPERPLASIA WITH NOCTURIA: ICD-10-CM

## 2025-02-03 DIAGNOSIS — Z53.20 COLONOSCOPY REFUSED: ICD-10-CM

## 2025-02-03 DIAGNOSIS — E78.2 MIXED HYPERLIPIDEMIA: ICD-10-CM

## 2025-02-03 DIAGNOSIS — R35.1 BENIGN PROSTATIC HYPERPLASIA WITH NOCTURIA: ICD-10-CM

## 2025-02-03 DIAGNOSIS — R13.19 OTHER DYSPHAGIA: Primary | ICD-10-CM

## 2025-02-03 DIAGNOSIS — N18.2 CKD (CHRONIC KIDNEY DISEASE) STAGE 2, GFR 60-89 ML/MIN: ICD-10-CM

## 2025-02-03 DIAGNOSIS — I25.2 HX OF NON-ST ELEVATION MYOCARDIAL INFARCTION (NSTEMI): ICD-10-CM

## 2025-02-03 DIAGNOSIS — E10.65 TYPE 1 DIABETES MELLITUS WITH HYPERGLYCEMIA (HCC): ICD-10-CM

## 2025-02-03 DIAGNOSIS — I25.10 CORONARY ARTERY DISEASE INVOLVING NATIVE HEART WITHOUT ANGINA PECTORIS, UNSPECIFIED VESSEL OR LESION TYPE: ICD-10-CM

## 2025-02-03 DIAGNOSIS — C96.6 LANGERHAN'S CELL HISTIOCYTOSIS (HCC): ICD-10-CM

## 2025-02-03 DIAGNOSIS — I95.1 ORTHOSTATIC HYPOTENSION: ICD-10-CM

## 2025-02-03 DIAGNOSIS — F41.9 ANXIETY: ICD-10-CM

## 2025-02-03 DIAGNOSIS — F09 MILD COGNITIVE DISORDER: ICD-10-CM

## 2025-02-03 DIAGNOSIS — I73.9 PERIPHERAL VASCULAR DISEASE, UNSPECIFIED (HCC): ICD-10-CM

## 2025-02-03 DIAGNOSIS — Z86.718 HISTORY OF DVT (DEEP VEIN THROMBOSIS): ICD-10-CM

## 2025-02-03 DIAGNOSIS — F51.01 PRIMARY INSOMNIA: ICD-10-CM

## 2025-02-03 DIAGNOSIS — B37.0 ORAL THRUSH: ICD-10-CM

## 2025-02-03 DIAGNOSIS — F22 DELUSION (HCC): ICD-10-CM

## 2025-02-03 PROCEDURE — 99310 SBSQ NF CARE HIGH MDM 45: CPT | Performed by: STUDENT IN AN ORGANIZED HEALTH CARE EDUCATION/TRAINING PROGRAM

## 2025-02-03 NOTE — ASSESSMENT & PLAN NOTE
Lab Results   Component Value Date    HGBA1C 7.1 (H) 07/01/2024     Monitor A1c routinely - recently stable ~8.4  Monitor glucose - overall reasonably controlled throughout day ranging low 100s-low 300s. Fasting sugars generally around low 100s  Avoid hypoglycemia - has improved from prior; rarely <100 typically in the AM  Continue insulin aspart with sliding scale TIDAC+HS  Continue insulin aspart 12 units with breakfast/lunch (will increase from 10u), 6 units with dinner  Continue lantus 20 u daily (recently decreased due to some hypoglycemia concern)  Adjust/titrate regimen as appropriate if sugars persistently elevated above goal  Following Endocrinology, recent visit Dec 2024  Follow up with Ophthalmology/Podiatry as appropriate

## 2025-02-03 NOTE — ASSESSMENT & PLAN NOTE
12/4/24 discussed routine screening colonoscopy  Discussed again 2/3/25, patient refuses; Patient believes he has had a colonoscopy in the past and chart mentions hx of colonoscopy though no record/details seem available. At this time patient declines to have further colon cancer screening citing lack of interest and lack of GI symptoms.  I am providing referral to GI as above to consider eval of possible esophageal component to his dysphagia, and colon cancer screening could also be considered at discretion of GI

## 2025-02-03 NOTE — ASSESSMENT & PLAN NOTE
Denies any recent notable orthostasis/worsening symptoms. Has mild stable intermittent orthostasis he manages by changing positions slowly  Appears he used to be on midodrine in the past. Monitor for need.  Monitor vitals. BP recently generally stable around 100s-130s systolic  Encourage fall precautions, appropriate DME use, slow positional changes, adequate PO hydration  Following Cardiology outpatient

## 2025-02-03 NOTE — ASSESSMENT & PLAN NOTE
Concern of dysphagia in recent weeks-months. He thinks it makes it a bit harder for him to swallow mainly dry solids due to some discomfort on swallowing but denies having choked on anything recently.  On a recent visit there was concern of oral thrush which was treated with nystatin and appears to have since resolved. This may have been contributory to dysphagia/throat sensation.  Aspiration precautions  Speech Therapy following at facility  Underwent VFSS on 2/3/25 - No notable oral or pharyngeal phase abnormality, had trace thin liquid penetration, concern for possible esophageal dysphagia. recommended continue mech soft/thins and OK to upgrade as tolerated/as cleared by Speech Therapy  Will order GI consult to follow up/eval possible esophageal dysphagia  Continue to monitor - overall symptomatically seems improved from prior per patient (may have had component of thrush causing worse symptoms previously) and stable recently

## 2025-02-03 NOTE — ASSESSMENT & PLAN NOTE
Following Heme/Onc at [facility], recent visit Feb 2024, next Feb 2025  Was treated in 2013 with 2-CDA  Completed 2 years of zoledronic acid  Considered stable per Heme/Onc, no evidence of recurrent disease  No apparent acute/associated symptoms presently

## 2025-02-03 NOTE — ASSESSMENT & PLAN NOTE
"Patient with some evident delusions since at least several months mainly focused on the idea that someone I.e. \"the \" or \"the \" at facility is telling him he can or cannot do certain things such as leave his room at times or else he will not get his food  He otherwise seems to be a fair historian and easily redirectable and does not seem to hold any other major delusions or note any hallucinations or other concerning psychiatric symptoms.  Psych hx mainly significant for anxiety/depression and insomnia  The delusion issue seems stable recently, perhaps a bit improved as per nursing  Psychiatry following at facility  Presently is on quetiapine 50mg qHS recently started and being gradually titrated as per Psych recommendation  Continue to monitor for acute change - seems stable if not a bit improved with the above  Supportive care  Consider further workup/intervention in conjunction with Psych team if issue persists/worsens.  Neurology consult has been placed, he is scheduled to see Neuro in early 2025  "

## 2025-02-03 NOTE — PROGRESS NOTES
[facility]  Facility: [facility]    PROGRESS NOTE  Nursing Home Place of Service: nursing home place of service: POS 32 Unskilled- No Part A Coverage    NAME: Tuan Remy  : 1955 AGE: 69 y.o. SEX: male MRN: [id number]  DATE OF ENCOUNTER: 2/3/2025    Assessment and Plan     Problem List Items Addressed This Visit       Hyperlipidemia    lipid panel (23)  cholesterol-109  triglycerides-61  LDL-53  HDL-44  Being treated with a high-intensity statin d/t prior history of a NSTEMI, PVD, and CAD  Continue atorvastatin 80 mg po daily, appears to be tolerating well  Goal LDL <70 per Cardiology         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HCC)    Following Heme/Onc at [facility], recent visit 2024, next 2025  Was treated in  with 2-CDA  Completed 2 years of zoledronic acid  Considered stable per Heme/Onc, no evidence of recurrent disease  No apparent acute/associated symptoms presently         Orthostatic hypotension    Denies any recent notable orthostasis/worsening symptoms. Has mild stable intermittent orthostasis he manages by changing positions slowly  Appears he used to be on midodrine in the past. Monitor for need.  Monitor vitals. BP recently generally stable around 100s-130s systolic  Encourage fall precautions, appropriate DME use, slow positional changes, adequate PO hydration  Following Cardiology outpatient          Peripheral vascular disease, unspecified (HCC)    Noted history  No apparent acute/associated concerns, seems stable  Monitor skin for breakdown, wounds  Continue aspirin, statin         Type 1 diabetes mellitus with hyperglycemia ([facility])      Lab Results   Component Value Date    HGBA1C 7.1 (H) 2024     Monitor A1c routinely - recently stable ~8.4  Monitor glucose - overall reasonably controlled throughout day ranging low 100s-low 300s. Fasting sugars generally around low 100s  Avoid hypoglycemia - has improved from prior; rarely <100 typically in the  "AM  Continue insulin aspart with sliding scale TIDAC+HS  Continue insulin aspart 12 units with breakfast/lunch (will increase from 10u), 6 units with dinner  Continue lantus 20 u daily (recently decreased due to some hypoglycemia concern)  Adjust/titrate regimen as appropriate if sugars persistently elevated above goal  Following Endocrinology, recent visit Dec 2024  Follow up with Ophthalmology/Podiatry as appropriate           Anxiety    Noted hx  Mood stable, good spirits  Monitor off anxiolytic meds  Supportive care  Psych following at facility         Coronary artery disease without angina pectoris    Lexiscan stress test:  1. Infarct involving the left circumflex territory/lateral wall with moderate  sonam-infarct ischemia.  2. Normal left ventricle systolic function with ejection fraction of 55%.     Previous Cardiology visit 1/26/23:  \"NSTEMI as noted previously. Currently doing well.  He will continue aggressive risk modification.  He will continue his current medications.  No further cardiac testing is indicated at this time. \"     Recent Cardiology visit 2/23/24:  To continue aspirin, statin. Avoided beta blocker/ACE-I with hx of orthostatic hypotension.     Next Cardio visit Feb 2025     No acute cardiac complaints, seems stable  Continue aspirin, statin         History of DVT (deep vein thrombosis)    Follows Heme/Onc at [facility]  Hx of unprovoked VTE  To continue Eliquis for AC (lifelong per Hematology recommendation)         CKD (chronic kidney disease) stage 2, GFR 60-89 ml/min    Lab Results   Component Value Date    EGFR 56 (L) 09/04/2024    EGFR 78 07/01/2024    EGFR 76 04/10/2024    CREATININE 1.37 (H) 09/04/2024    CREATININE 1.04 07/01/2024    CREATININE 1.06 04/10/2024     Monitor renal function on routine labs - stable in baseline range  Avoid nephrotoxins, NSAIDs as able  Encourage PO hydration, respecting volume status           Hx of non-ST elevation myocardial infarction (NSTEMI)    See plan " "under CAD          Mild cognitive disorder    Noted hx of cognitive impairment  Patient Aox3 and seems to be a fair historian though a bit forgetful and with some mild paranoia in his statements, mentation seems stable  May require redirection and reorientation  Encourage participation with group activities  Encourage staying mentally active with word searches, crossword puzzles, and suduko   Avoid deliriogenic medications   Continue 24/7 supportive care at [facility]  Psych team following at facility         Primary insomnia    Stable  Continue melatonin  Recently started on quetiapine as per Psych  Trazodone was tried and weaned off as seemed ineffective  Supportive care, sleep hygiene         Benign prostatic hyperplasia with nocturia    BPH with LUTS  Following Urology outpatient, recent visit Oct 2024  Started on flomax 0.4mg in Jan 2024  Monitor PSA routinely - WNL though some mild uptrend noted, continue to monitor, as per Urology check yearly before next Urology visit  Seems symptomatically stable  CT abdomen pelvis completed on 09/04 during ED evaluation  Small enhancing nodule at the bladder base in the region of the trigone unchanged from prior         Delusion (HCC)    Patient with some evident delusions since at least several months mainly focused on the idea that someone I.e. \"the \" or \"the \" at facility is telling him he can or cannot do certain things such as leave his room at times or else he will not get his food  He otherwise seems to be a fair historian and easily redirectable and does not seem to hold any other major delusions or note any hallucinations or other concerning psychiatric symptoms.  Psych hx mainly significant for anxiety/depression and insomnia  The delusion issue seems stable recently, perhaps a bit improved as per nursing  Psychiatry following at facility  Presently is on quetiapine 50mg qHS recently started and being gradually titrated as per Psych recommendation  Continue " to monitor for acute change - seems stable if not a bit improved with the above  Supportive care  Consider further workup/intervention in conjunction with Psych team if issue persists/worsens.  Neurology consult has been placed, he is scheduled to see Neuro in early 2025         Colonoscopy refused    12/4/24 discussed routine screening colonoscopy  Discussed again 2/3/25, patient refuses; Patient believes he has had a colonoscopy in the past and chart mentions hx of colonoscopy though no record/details seem available. At this time patient declines to have further colon cancer screening citing lack of interest and lack of GI symptoms.  I am providing referral to GI as above to consider eval of possible esophageal component to his dysphagia, and colon cancer screening could also be considered at discretion of GI         Other dysphagia - Primary    Concern of dysphagia in recent weeks-months. He thinks it makes it a bit harder for him to swallow mainly dry solids due to some discomfort on swallowing but denies having choked on anything recently.  On a recent visit there was concern of oral thrush which was treated with nystatin and appears to have since resolved. This may have been contributory to dysphagia/throat sensation.  Aspiration precautions  Speech Therapy following at facility  Underwent VFSS on 2/3/25 - No notable oral or pharyngeal phase abnormality, had trace thin liquid penetration, concern for possible esophageal dysphagia. recommended continue mech soft/thins and OK to upgrade as tolerated/as cleared by Speech Therapy  Will order GI consult to follow up/eval possible esophageal dysphagia  Continue to monitor - overall symptomatically seems improved from prior per patient (may have had component of thrush causing worse symptoms previously) and stable recently         Oral thrush    Appears resolved since last visit                            Chief Complaint     Follow up 30 day    History of Present  Illness     Tuan Remy is a 69 y.o. male who was seen today for follow up. PMH including type 1 DM, orthostatic hypotension, peripheral vascular disease, atherosclerotic heart disease, hyperlipidemia, LCH, hyperlipidemia        Patient seen and examined in room  Others present: none  Patient seated in chair watching TV  Appears comfortable, awake, alert, oriented to situation, able to converse appropriately  Polite, Aox3 though a bit forgetful of details, in good spirits, appearing similar to my prior visit in terms of mentation. Notes he feels well overall, recalls details of his swallow study earlier today, and notes he overall feels well with no acute concerns at this time. He feels his dysphagia is improved and not bothering him lately and is aware of the recommendation from today to advance diet as tolerated pending Speech eval. No recent sore throat. No acute pain anywhere. Endorses he retains a good appetite. Denies any acute GI symptoms, endorses regular BM, no abd pain, no N/V/C/D. Breathing fine, no orthopnea/SOB/cough. No CP/palpitations; he has chronic intermittent orthostatic lightheadedness which is mild/stable, he is aware to get up slowly. No acute cardiac, abdominal, or urinary symptoms; see ROS for more details. He does not feel feverish or sick or confused. Gets around mainly with walker and feels steady with this. Feels his mood is good lately. He does not make any obvious paranoid/delusional statements on exam today.    No further questions or acute concerns identified.  No further acute concerns per nursing. Per nursing patient with stable occasional delusional statements but no noted aggressive behaviors.     Lab Review:   9/14/23: A1c 7.5 (improved from 7.9 in July 2023)  12/28/23: PSA WNL (2.45)  1/29/24: CBC with Hb 11.8; CMP generally non-actionable, glu 50, eGFR 75; last TSH WNL from April 2023  4/10/24: CBC with Hb 11.4; BMP generally stable/non-actionable, eGFR 76; A1c 7.7  6/24/24:  "PSA WNL (3.42)  7/1/24: CBC with Hb 12.0; BMP generally stable/non-actionable, eGFR 78; LDL 52; TSH WNL; A1c 7.1  7/24/24: PSA 3.52 (WNL)  9/4/24: CMP with Cr 1.37, GFR 56 (baseline 60s-70s); CBC generally stable/non-actionable, Hb 12.3; blood cultures NGTD; urine culture with no notable growth  10/30/24: BMP generally stable/non-actionable, Cr 1.05, eGFR 77; CBC with Hb 11.7; A1c 8.4  12/20/24: vit D 30, B12 301  12/26/24: respiratory viral panel negative  12/27/24: CBC with Hb 11.9 (normocytic); BMP with Cr 1.19, eGFR 66  1/29/25: CBC with Hb 12.2; BMP with Cr 1.02, GFR 80; A1c 8.4      Lab Results   Component Value Date    HGBA1C 7.1 (H) 07/01/2024     Lab Results   Component Value Date    TSH 2.25 07/01/2024     No results found for: \"ISGQBXTS52\"  Lab Results   Component Value Date    FERRITIN 339 (H) 04/28/2020     No results found for: \"CHOLESTEROL\"  No results found for: \"HDL\"  No results found for: \"TRIG\"  No results found for: \"NONHDLC\"  No results found for: \"LDLCALC\"        FL barium swallow video w speech  Result Date: 2/3/2025  IMPRESSION: Penetration of thin consistency. No aspiration.     CTA abdomen pelvis w wo contrast  Result Date: 9/4/2024  1. No acute mass nor inflammatory change in the abdomen. Solid organs grossly stable. No evidence of abscess given history 2. Chronic circumferential distal esophageal wall thickening. Please correlate for chronic reflux esophagitis. 3. Chronic marked rectal fecal retention/possible impaction. Mass effect on the bladder. 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 4. Small enhancing nodule at the bladder base in the region of the trigone unchanged from prior see above comments.     CT head wo contrast  Result Date: 9/4/2024  1.  No acute intracranial findings: No large acute transcortical infarct or intracranial hemorrhage. 2.  Mild white matter changes, which are nonspecific commonly seen with chronic microvascular ischemic change.    XR chest portable  Result Date: " "9/4/2024  Impression: No acute pathology visualized.           Echo Oct 2022: EF 60-65, normal diastolic function  EKG 12/20/24 \"sinus rhythm. Normal ECG. Intervals: HR: 75 bpm NJ: 123 ms QRS: 101 ms QT: 383 ms RR: 800 ms QTC: 430 ms Axes: P: 34 Deg QRS: 15 Deg T: 37 Deg\"       The following portions of the patient's history were reviewed and updated as appropriate: allergies, current medications, past family history, past medical history, past social history, past surgical history and problem list.    Review of Systems     Review of Systems   Constitutional:  Negative for appetite change, chills, diaphoresis and fever.   HENT:  Negative for drooling, ear pain, hearing loss, rhinorrhea, sore throat and trouble swallowing.    Eyes:  Negative for pain, discharge, redness, itching and visual disturbance.   Respiratory:  Negative for cough, chest tightness, shortness of breath and wheezing.    Cardiovascular:  Negative for chest pain, palpitations and leg swelling (mild, baseline).   Gastrointestinal:  Negative for abdominal pain, blood in stool, constipation, diarrhea, nausea and vomiting.   Genitourinary:  Negative for difficulty urinating, dysuria, flank pain and hematuria.   Musculoskeletal:  Positive for gait problem. Negative for arthralgias, back pain and neck pain.   Skin:  Negative for color change.   Neurological:  Positive for tremors (stable). Negative for dizziness, facial asymmetry, speech difficulty, weakness, light-headedness and headaches.   Psychiatric/Behavioral:  Negative for agitation, behavioral problems and confusion. The patient is not nervous/anxious and is not hyperactive.        Active Problem List     Patient Active Problem List   Diagnosis    Ambulatory dysfunction    Blurry vision, right eye    Hyperlipidemia    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HCC)    Orthostatic hypotension    Peripheral vascular disease, unspecified (HCC)    Type 1 diabetes mellitus with hyperglycemia (HCC)    Sinus " tachycardia    Anxiety    Reactive depression    Coronary artery disease without angina pectoris    Acute kidney injury superimposed on chronic kidney disease  (HCC)    History of DVT (deep vein thrombosis)    Hyperlipidemia due to type 1 diabetes mellitus  (HCC)    Other constipation    CKD (chronic kidney disease) stage 2, GFR 60-89 ml/min    Urinary frequency    Hx of non-ST elevation myocardial infarction (NSTEMI)    Mild cognitive disorder    Primary insomnia    Benign prostatic hyperplasia with nocturia    Advance care planning    Falls    Acute confusion    Delusion (HCC)    Colonoscopy refused    Other dysphagia    Viral infection    Oral thrush       Objective     Vital Signs:     BP: 98/57 mmHg  1/31/2025 16:51 Temp:98.7 °F  1/4/2025 01:15 Pulse:78 bpm  1/31/2025 09:24 Weight:188.9 Lbs  1/2/2025 02:31   Resp:18 Breaths/min  1/3/2025 02:14 BS:224 mg/dL  2/3/2025 12:39 O2:96 %  1/2/2025 14:25 Pain:0  2/3/2025 07:27       Physical Exam  Vitals reviewed.   Constitutional:       General: He is not in acute distress.     Appearance: He is not toxic-appearing or diaphoretic.   HENT:      Head: Normocephalic and atraumatic.      Right Ear: External ear normal.      Left Ear: External ear normal.      Nose: Nose normal. No rhinorrhea.      Mouth/Throat:      Mouth: Mucous membranes are dry.      Pharynx: Oropharynx is clear. No oropharyngeal exudate or posterior oropharyngeal erythema.   Eyes:      General: No scleral icterus.        Right eye: No discharge.         Left eye: No discharge.      Extraocular Movements: Extraocular movements intact.      Conjunctiva/sclera: Conjunctivae normal.      Pupils: Pupils are equal, round, and reactive to light.   Cardiovascular:      Rate and Rhythm: Normal rate and regular rhythm.   Pulmonary:      Effort: Pulmonary effort is normal. No respiratory distress.      Breath sounds: No wheezing or rales.   Abdominal:      General: Bowel sounds are normal.      Palpations:  Abdomen is soft.      Tenderness: There is no abdominal tenderness. There is no guarding.   Musculoskeletal:         General: No tenderness.      Cervical back: No rigidity.      Comments: Trace bilateral LE edema  No calf tenderness   Skin:     General: Skin is warm and dry.      Coloration: Skin is not jaundiced.   Neurological:      General: No focal deficit present.      Mental Status: He is alert and oriented to person, place, and time. Mental status is at baseline.   Psychiatric:         Mood and Affect: Mood normal.         Behavior: Behavior normal.         Thought Content: Thought content normal.         Pertinent Laboratory/Diagnostic Studies:  Laboratory and Imaging studies reviewed. Full report in the paper chart.     Current Medications   Medications reviewed and updated in facility chart.      -Total time spent on this encounter today including documentation and workup review, face to face time, history and exam, and documentation/orders, was approximately 50 minutes.  -This note will be copied to [facility] EMR where vitals and medication orders are placed.    Ryan Chester D.O.  Geriatric Medicine  2/3/2025 5:18 PM

## 2025-03-02 ENCOUNTER — TELEPHONE (OUTPATIENT)
Dept: OTHER | Facility: OTHER | Age: 70
End: 2025-03-02

## 2025-03-02 NOTE — TELEPHONE ENCOUNTER
"Chanell from [facility] stated, \" He had a Low blood sugar that required Glucagon.\"     Paged on call VIA [facility]   "

## 2025-03-06 ENCOUNTER — NURSING HOME VISIT (OUTPATIENT)
Dept: GERIATRICS | Facility: OTHER | Age: 70
End: 2025-03-06
Payer: MEDICARE

## 2025-03-06 DIAGNOSIS — C96.6 LANGERHAN'S CELL HISTIOCYTOSIS (HCC): ICD-10-CM

## 2025-03-06 DIAGNOSIS — F09 MILD COGNITIVE DISORDER: ICD-10-CM

## 2025-03-06 DIAGNOSIS — N40.1 BENIGN PROSTATIC HYPERPLASIA WITH NOCTURIA: ICD-10-CM

## 2025-03-06 DIAGNOSIS — Z86.718 HISTORY OF DVT (DEEP VEIN THROMBOSIS): ICD-10-CM

## 2025-03-06 DIAGNOSIS — E10.65 TYPE 1 DIABETES MELLITUS WITH HYPERGLYCEMIA (HCC): Primary | ICD-10-CM

## 2025-03-06 DIAGNOSIS — F41.9 ANXIETY: ICD-10-CM

## 2025-03-06 DIAGNOSIS — E78.2 MIXED HYPERLIPIDEMIA: ICD-10-CM

## 2025-03-06 DIAGNOSIS — I25.2 HX OF NON-ST ELEVATION MYOCARDIAL INFARCTION (NSTEMI): ICD-10-CM

## 2025-03-06 DIAGNOSIS — F51.01 PRIMARY INSOMNIA: ICD-10-CM

## 2025-03-06 DIAGNOSIS — I73.9 PERIPHERAL VASCULAR DISEASE, UNSPECIFIED (HCC): ICD-10-CM

## 2025-03-06 DIAGNOSIS — Z53.20 COLONOSCOPY REFUSED: ICD-10-CM

## 2025-03-06 DIAGNOSIS — I25.10 CORONARY ARTERY DISEASE INVOLVING NATIVE HEART WITHOUT ANGINA PECTORIS, UNSPECIFIED VESSEL OR LESION TYPE: ICD-10-CM

## 2025-03-06 DIAGNOSIS — R35.1 BENIGN PROSTATIC HYPERPLASIA WITH NOCTURIA: ICD-10-CM

## 2025-03-06 DIAGNOSIS — N18.2 CKD (CHRONIC KIDNEY DISEASE) STAGE 2, GFR 60-89 ML/MIN: ICD-10-CM

## 2025-03-06 DIAGNOSIS — F22 DELUSION (HCC): ICD-10-CM

## 2025-03-06 DIAGNOSIS — I95.1 ORTHOSTATIC HYPOTENSION: ICD-10-CM

## 2025-03-06 PROBLEM — B34.9 VIRAL INFECTION: Status: RESOLVED | Noted: 2024-12-27 | Resolved: 2025-03-06

## 2025-03-06 PROBLEM — R41.0 ACUTE CONFUSION: Status: RESOLVED | Noted: 2024-09-10 | Resolved: 2025-03-06

## 2025-03-06 PROCEDURE — 99310 SBSQ NF CARE HIGH MDM 45: CPT | Performed by: STUDENT IN AN ORGANIZED HEALTH CARE EDUCATION/TRAINING PROGRAM

## 2025-03-06 NOTE — ASSESSMENT & PLAN NOTE
Lab Results   Component Value Date    EGFR 56 (L) 09/04/2024    EGFR 78 07/01/2024    EGFR 76 04/10/2024    CREATININE 1.37 (H) 09/04/2024    CREATININE 1.04 07/01/2024    CREATININE 1.06 04/10/2024     Lab Results   Component Value Date    EGFR 56 (L) 09/04/2024    EGFR 78 07/01/2024    EGFR 76 04/10/2024    CREATININE 1.37 (H) 09/04/2024    CREATININE 1.04 07/01/2024    CREATININE 1.06 04/10/2024     Monitor renal function on routine labs - stable in baseline range  Avoid nephrotoxins, NSAIDs as able  Encourage PO hydration, respecting volume status

## 2025-03-06 NOTE — ASSESSMENT & PLAN NOTE
Have discussed several times with patient, he declines colon cancer screening. Patient believes he has had a colonoscopy in the past and chart mentions hx of colonoscopy though no record/details seem available.   Recently ordered GI consult to eval of possible esophageal component to his recent dysphagia, and colon cancer screening could also be considered at discretion of GI. He is scheduled with GI 3/17/25

## 2025-03-06 NOTE — PROGRESS NOTES
[facility]  Facility: [facility]    PROGRESS NOTE  Nursing Home Place of Service: nursing home place of service: POS 32 Unskilled- No Part A Coverage    NAME: Tuan Remy  : 1955 AGE: 69 y.o. SEX: male MRN: [id number]  DATE OF ENCOUNTER: 3/6/2025    Assessment and Plan     Problem List Items Addressed This Visit       Hyperlipidemia    lipid panel (23)  cholesterol-109  triglycerides-61  LDL-53  HDL-44  Being treated with a high-intensity statin d/t prior history of a NSTEMI, PVD, and CAD  Continue atorvastatin 80 mg po daily, appears to be tolerating well  Goal LDL <70 per Cardiology         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HCC)    Following Heme/Onc at [facility], recent visit 2025  Was treated in  with 2-CDA  Completed 2 years of zoledronic acid  Considered stable per Heme/Onc, no evidence of recurrent disease  No apparent acute/associated symptoms presently         Orthostatic hypotension    Denies any recent notable orthostasis/worsening symptoms. Has mild stable intermittent orthostasis he manages by changing positions slowly  Appears he used to be on midodrine in the past. Monitor for need.  Monitor vitals. BP recently generally stable around 100s-130s systolic  Encourage fall precautions, appropriate DME use, slow positional changes, adequate PO hydration  Following Cardiology outpatient          Peripheral vascular disease, unspecified (HCC)    Noted history  No apparent acute/associated concerns, seems stable  Monitor skin for breakdown, wounds  Continue aspirin, statin         Type 1 diabetes mellitus with hyperglycemia (HCC) - Primary      Lab Results   Component Value Date    HGBA1C 7.1 (H) 2024     Monitor A1c routinely - recently stable ~8.4  Monitor glucose - there was some recent AM hypoglycemia in early 2025 requiring reduction in his insulin dosing and updating of hold parameters. Presently hypoglycemia has improved, sugars remain somewhat labile  throughout day ranging around low 100s-low 300s. Highest readings tend to occur around/after dinner time/late evening.  Avoid hypoglycemia  Continue insulin aspart with sliding scale TIDAC+HS  Continue insulin aspart 12 units with breakfast/lunch, 10 units with dinner (will increase from 6 units)  Continue lantus 10 u daily (recently decreased due to some hypoglycemia concern)  Adjust/titrate regimen as appropriate with more data  Following Endocrinology, recent visit Dec 2024  Follow up with Ophthalmology/Podiatry as appropriate           Anxiety    Noted hx  Mood stable, good spirits  Monitor off anxiolytic meds  Supportive care  Psych following at facility         Coronary artery disease without angina pectoris    Lexiscan stress test:  1. Infarct involving the left circumflex territory/lateral wall with moderate  sonam-infarct ischemia.  2. Normal left ventricle systolic function with ejection fraction of 55%.      Recent Cardiology visit Feb 2025, considered stable, To continue aspirin, statin.  Avoided beta blocker/ACE-I with hx of orthostatic hypotension.   No acute cardiac complaints, seems stable  Continue aspirin, statin         History of DVT (deep vein thrombosis)    Follows Heme/Onc at [facility]  Hx of unprovoked VTE  To continue Eliquis for AC (lifelong per Hematology recommendation)         CKD (chronic kidney disease) stage 2, GFR 60-89 ml/min    Lab Results   Component Value Date    EGFR 56 (L) 09/04/2024    EGFR 78 07/01/2024    EGFR 76 04/10/2024    CREATININE 1.37 (H) 09/04/2024    CREATININE 1.04 07/01/2024    CREATININE 1.06 04/10/2024     Lab Results   Component Value Date    EGFR 56 (L) 09/04/2024    EGFR 78 07/01/2024    EGFR 76 04/10/2024    CREATININE 1.37 (H) 09/04/2024    CREATININE 1.04 07/01/2024    CREATININE 1.06 04/10/2024     Monitor renal function on routine labs - stable in baseline range  Avoid nephrotoxins, NSAIDs as able  Encourage PO hydration, respecting volume status           Hx  "of non-ST elevation myocardial infarction (NSTEMI)    See plan under CAD          Mild cognitive disorder    Noted hx of cognitive impairment  Patient Aox3 and seems to be a fair historian though a bit forgetful and with some mild paranoia in his statements at times, mentation seems stable  May require redirection and reorientation  Encourage participation with group activities  Encourage staying mentally active with word searches, crossword puzzles, and suduko   Avoid deliriogenic medications   Continue 24/7 supportive care at [facility]  Psych team following at facility  Scheduled to see Neurology         Primary insomnia    Stable  Continue melatonin  Recently started on quetiapine as per Psych  Trazodone was tried and weaned off as seemed ineffective  Supportive care, sleep hygiene         Benign prostatic hyperplasia with nocturia    BPH with LUTS  Following Urology outpatient, recent visit Oct 2024  Started on flomax 0.4mg in Jan 2024  Monitor PSA routinely - WNL though some mild uptrend noted, continue to monitor, as per Urology check yearly before next Urology visit  Seems symptomatically stable  CT abdomen pelvis completed on 09/04 during ED evaluation  Small enhancing nodule at the bladder base in the region of the trigone unchanged from prior         Delusion (HCC)    Patient with some evident delusions since at least several months mainly focused on the idea that someone I.e. \"the \" or \"the \", someone named \"Axel\", at facility is telling him he can or cannot do certain things such as leave his room at times or else he will not get his food  He otherwise seems to be a fair historian and easily redirectable and does not seem to hold any other major delusions or note any hallucinations or other concerning psychiatric symptoms.  Psych hx mainly significant for anxiety/depression and insomnia  The delusion issue seems stable recently, perhaps a bit improved in fact  Psychiatry following at " facility  Presently is on quetiapine 50mg qHS recently started and being gradually titrated as per Psych recommendation  Continue to monitor for acute change - seems stable if not a bit improved with the above  Supportive care  Consider further workup/intervention in conjunction with Psych team if issue persists/worsens.  Neurology consult has been placed, he is scheduled to see Neuro in early 2025         Colonoscopy refused    Have discussed several times with patient, he declines colon cancer screening. Patient believes he has had a colonoscopy in the past and chart mentions hx of colonoscopy though no record/details seem available.   Recently ordered GI consult to eval of possible esophageal component to his recent dysphagia, and colon cancer screening could also be considered at discretion of GI. He is scheduled with GI 3/17/25                              Chief Complaint     Follow up 60 day    History of Present Illness     Tuan Remy is a 69 y.o. male who was seen today for follow up. PMH including type 1 DM, orthostatic hypotension, peripheral vascular disease, atherosclerotic heart disease, hyperlipidemia, LCH, hyperlipidemia        Patient seen and examined in room  Others present: none  Patient laying in bed  Appears comfortable, awake, alert, oriented to situation, able to converse appropriately  Polite, Aox3 though a bit forgetful of details, in good spirits, appearing similar to my prior visit in terms of mentation. Notes he feels well overall, recalls details of his recent Cardio visit, and notes he overall feels well with no acute concerns at this time.   No acute pain anywhere. Endorses good appetite, no sore throat or dysphagia (prior concern has been resolved). Denies any acute GI symptoms, endorses regular BM, no abd pain, no N/V/C/D. Breathing fine, no orthopnea/SOB/cough. No CP/palpitations; he has chronic intermittent orthostatic lightheadedness which is mild/stable, he is aware to get up  "slowly. No acute cardiac, abdominal, or urinary symptoms; see ROS for more details. He does not feel feverish or sick or confused. Gets around mainly with walker and feels steady with this. Feels his mood is good lately. He does not make any obvious paranoid/delusional statements on exam today and in fact requests to go out to eat lunch with others in the dining room.    No further questions or acute concerns identified.  No further acute concerns per nursing. Per nursing patient with stable or somewhat improved occasional delusional statements and no noted aggressive behaviors. Asked nursing to help patient out of room for meals to socialize more, which he wants at this time.     Lab Review:   9/14/23: A1c 7.5 (improved from 7.9 in July 2023)  12/28/23: PSA WNL (2.45)  1/29/24: CBC with Hb 11.8; CMP generally non-actionable, glu 50, eGFR 75; last TSH WNL from April 2023  4/10/24: CBC with Hb 11.4; BMP generally stable/non-actionable, eGFR 76; A1c 7.7  6/24/24: PSA WNL (3.42)  7/1/24: CBC with Hb 12.0; BMP generally stable/non-actionable, eGFR 78; LDL 52; TSH WNL; A1c 7.1  7/24/24: PSA 3.52 (WNL)  9/4/24: CMP with Cr 1.37, GFR 56 (baseline 60s-70s); CBC generally stable/non-actionable, Hb 12.3; blood cultures NGTD; urine culture with no notable growth  10/30/24: BMP generally stable/non-actionable, Cr 1.05, eGFR 77; CBC with Hb 11.7; A1c 8.4  12/20/24: vit D 30, B12 301  12/26/24: respiratory viral panel negative  12/27/24: CBC with Hb 11.9 (normocytic); BMP with Cr 1.19, eGFR 66  1/29/25: CBC with Hb 12.2; BMP with Cr 1.02, GFR 80; A1c 8.4      Lab Results   Component Value Date    HGBA1C 7.1 (H) 07/01/2024     Lab Results   Component Value Date    TSH 2.25 07/01/2024     No results found for: \"AWWNYEAA98\"  Lab Results   Component Value Date    FERRITIN 339 (H) 04/28/2020     No results found for: \"CHOLESTEROL\"  No results found for: \"HDL\"  No results found for: \"TRIG\"  No results found for: \"NONHDLC\"  No results " "found for: \"LDLCALC\"        FL barium swallow video w speech  Result Date: 2/3/2025  IMPRESSION: Penetration of thin consistency. No aspiration.     CTA abdomen pelvis w wo contrast  Result Date: 9/4/2024  1. No acute mass nor inflammatory change in the abdomen. Solid organs grossly stable. No evidence of abscess given history 2. Chronic circumferential distal esophageal wall thickening. Please correlate for chronic reflux esophagitis. 3. Chronic marked rectal fecal retention/possible impaction. Mass effect on the bladder. 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 4. Small enhancing nodule at the bladder base in the region of the trigone unchanged from prior see above comments.     CT head wo contrast  Result Date: 9/4/2024  1.  No acute intracranial findings: No large acute transcortical infarct or intracranial hemorrhage. 2.  Mild white matter changes, which are nonspecific commonly seen with chronic microvascular ischemic change.    XR chest portable  Result Date: 9/4/2024  Impression: No acute pathology visualized.           Echo Oct 2022: EF 60-65, normal diastolic function  EKG 12/20/24 \"sinus rhythm. Normal ECG. Intervals: HR: 75 bpm CO: 123 ms QRS: 101 ms QT: 383 ms RR: 800 ms QTC: 430 ms Axes: P: 34 Deg QRS: 15 Deg T: 37 Deg\"       The following portions of the patient's history were reviewed and updated as appropriate: allergies, current medications, past family history, past medical history, past social history, past surgical history and problem list.    Review of Systems     Review of Systems   Constitutional:  Negative for appetite change, chills, diaphoresis and fever.   HENT:  Negative for drooling, ear pain, hearing loss, rhinorrhea, sore throat and trouble swallowing.    Eyes:  Negative for pain, discharge, redness, itching and visual disturbance.   Respiratory:  Negative for cough, chest tightness, shortness of breath and wheezing.    Cardiovascular:  Negative for chest pain, palpitations and leg " swelling (mild, baseline).   Gastrointestinal:  Negative for abdominal pain, blood in stool, constipation, diarrhea, nausea and vomiting.   Genitourinary:  Negative for difficulty urinating, dysuria, flank pain and hematuria.   Musculoskeletal:  Positive for gait problem. Negative for arthralgias, back pain and neck pain.   Skin:  Negative for color change.   Neurological:  Positive for tremors (stable). Negative for dizziness, facial asymmetry, speech difficulty, weakness, light-headedness (none acute; chronic intermittent orthostatic lightheadedness) and headaches.   Psychiatric/Behavioral:  Negative for agitation, behavioral problems and confusion. The patient is not nervous/anxious and is not hyperactive.        Active Problem List     Patient Active Problem List   Diagnosis    Ambulatory dysfunction    Blurry vision, right eye    Hyperlipidemia    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HCC)    Orthostatic hypotension    Peripheral vascular disease, unspecified (HCC)    Type 1 diabetes mellitus with hyperglycemia (HCC)    Sinus tachycardia    Anxiety    Reactive depression    Coronary artery disease without angina pectoris    Acute kidney injury superimposed on chronic kidney disease  (HCC)    History of DVT (deep vein thrombosis)    Hyperlipidemia due to type 1 diabetes mellitus  (HCC)    Other constipation    CKD (chronic kidney disease) stage 2, GFR 60-89 ml/min    Urinary frequency    Hx of non-ST elevation myocardial infarction (NSTEMI)    Mild cognitive disorder    Primary insomnia    Benign prostatic hyperplasia with nocturia    Advance care planning    Falls    Delusion (HCC)    Colonoscopy refused    Other dysphagia    Oral thrush       Objective     Vital Signs:     BP: 119/63 mmHg  2/28/2025 16:11 Temp:98.7 °F  1/4/2025 01:15 Pulse:74 bpm  2/28/2025 09:25 Weight:183.4 Lbs  3/2/2025 10:38   Resp:18 Breaths/min  1/3/2025 02:14 BS:207 mg/dL  3/6/2025 11:18 O2:96 %  1/2/2025 14:25 Pain:0  3/6/2025 07:22        Physical Exam  Vitals reviewed.   Constitutional:       General: He is not in acute distress.     Appearance: He is not toxic-appearing or diaphoretic.   HENT:      Head: Normocephalic and atraumatic.      Right Ear: External ear normal.      Left Ear: External ear normal.      Nose: Nose normal. No rhinorrhea.      Mouth/Throat:      Mouth: Mucous membranes are dry.      Pharynx: Oropharynx is clear. No oropharyngeal exudate or posterior oropharyngeal erythema.   Eyes:      General: No scleral icterus.        Right eye: No discharge.         Left eye: No discharge.      Extraocular Movements: Extraocular movements intact.      Conjunctiva/sclera: Conjunctivae normal.      Pupils: Pupils are equal, round, and reactive to light.   Cardiovascular:      Rate and Rhythm: Normal rate and regular rhythm.   Pulmonary:      Effort: Pulmonary effort is normal. No respiratory distress.      Breath sounds: No wheezing or rales.   Abdominal:      General: Bowel sounds are normal.      Palpations: Abdomen is soft.      Tenderness: There is no abdominal tenderness. There is no guarding.   Musculoskeletal:         General: No tenderness.      Cervical back: No rigidity.      Comments: Trace bilateral LE edema  No calf tenderness   Skin:     General: Skin is warm and dry.      Coloration: Skin is not jaundiced.   Neurological:      General: No focal deficit present.      Mental Status: He is alert and oriented to person, place, and time. Mental status is at baseline.   Psychiatric:         Mood and Affect: Mood normal.         Behavior: Behavior normal.         Thought Content: Thought content normal.         Pertinent Laboratory/Diagnostic Studies:  Laboratory and Imaging studies reviewed. Full report in the paper chart.     Current Medications   Medications reviewed and updated in facility chart.      -Total time spent on this encounter today including documentation and workup review, face to face time, history and exam,  and documentation/orders, extensive review/discussion with nursing regarding his sugars/diabetic control, and review/consolidation of recent outpatient specialist recs, was approximately 55 minutes.  -This note will be copied to [facility] EMR where vitals and medication orders are placed.    Ryan Chester D.O.  Geriatric Medicine  3/6/2025 11:54 AM

## 2025-03-06 NOTE — ASSESSMENT & PLAN NOTE
Lexiscan stress test:  1. Infarct involving the left circumflex territory/lateral wall with moderate  sonam-infarct ischemia.  2. Normal left ventricle systolic function with ejection fraction of 55%.      Recent Cardiology visit Feb 2025, considered stable, To continue aspirin, statin.  Avoided beta blocker/ACE-I with hx of orthostatic hypotension.   No acute cardiac complaints, seems stable  Continue aspirin, statin

## 2025-03-06 NOTE — ASSESSMENT & PLAN NOTE
"Patient with some evident delusions since at least several months mainly focused on the idea that someone I.e. \"the \" or \"the \", someone named \"Axel\", at facility is telling him he can or cannot do certain things such as leave his room at times or else he will not get his food  He otherwise seems to be a fair historian and easily redirectable and does not seem to hold any other major delusions or note any hallucinations or other concerning psychiatric symptoms.  Psych hx mainly significant for anxiety/depression and insomnia  The delusion issue seems stable recently, perhaps a bit improved in fact  Psychiatry following at facility  Presently is on quetiapine 50mg qHS recently started and being gradually titrated as per Psych recommendation  Continue to monitor for acute change - seems stable if not a bit improved with the above  Supportive care  Consider further workup/intervention in conjunction with Psych team if issue persists/worsens.  Neurology consult has been placed, he is scheduled to see Neuro in early 2025  "

## 2025-03-06 NOTE — ASSESSMENT & PLAN NOTE
Following Heme/Onc at [facility], recent visit Feb 2025  Was treated in 2013 with 2-CDA  Completed 2 years of zoledronic acid  Considered stable per Heme/Onc, no evidence of recurrent disease  No apparent acute/associated symptoms presently

## 2025-03-06 NOTE — ASSESSMENT & PLAN NOTE
Noted hx of cognitive impairment  Patient Aox3 and seems to be a fair historian though a bit forgetful and with some mild paranoia in his statements at times, mentation seems stable  May require redirection and reorientation  Encourage participation with group activities  Encourage staying mentally active with word searches, crossword puzzles, and suduko   Avoid deliriogenic medications   Continue 24/7 supportive care at [facility]  Psych team following at facility  Scheduled to see Neurology

## 2025-03-06 NOTE — ASSESSMENT & PLAN NOTE
Lab Results   Component Value Date    HGBA1C 7.1 (H) 07/01/2024     Monitor A1c routinely - recently stable ~8.4  Monitor glucose - there was some recent AM hypoglycemia in early March 2025 requiring reduction in his insulin dosing and updating of hold parameters. Presently hypoglycemia has improved, sugars remain somewhat labile throughout day ranging around low 100s-low 300s. Highest readings tend to occur around/after dinner time/late evening.  Avoid hypoglycemia  Continue insulin aspart with sliding scale TIDAC+HS  Continue insulin aspart 12 units with breakfast/lunch, 10 units with dinner (will increase from 6 units)  Continue lantus 10 u daily (recently decreased due to some hypoglycemia concern)  Adjust/titrate regimen as appropriate with more data  Following Endocrinology, recent visit Dec 2024  Follow up with Ophthalmology/Podiatry as appropriate

## 2025-03-19 ENCOUNTER — TELEPHONE (OUTPATIENT)
Dept: GERIATRICS | Facility: OTHER | Age: 70
End: 2025-03-19

## 2025-03-19 NOTE — TELEPHONE ENCOUNTER
At request of nursing called and spoke to patient's sister Archana who requested patient not have EGD or colonoscopy. He recently saw GI for some recent hx of dysphagia. We discussed that his dysphagia at the time may have been related o oral thrush at the time which has since been treated and recently patient without any signs/symptoms or dysphagia. Sister concerned he would not tolerate prep for colonoscopy due to his type 1 diabetes, moreover patient has also declined colonoscopy to me several times, and clinically they are presently preferring he does not undergo EGD as he feels he is doing fine with his swallowing. I will disucss with staff to hold off on GI follow-ups for now unless new/associated symptoms arise. Sister amenable and appreciative, no further questions.

## 2025-04-07 ENCOUNTER — NURSING HOME VISIT (OUTPATIENT)
Dept: GERIATRICS | Facility: OTHER | Age: 70
End: 2025-04-07
Payer: MEDICARE

## 2025-04-07 DIAGNOSIS — C96.6 LANGERHAN'S CELL HISTIOCYTOSIS (HCC): ICD-10-CM

## 2025-04-07 DIAGNOSIS — F51.01 PRIMARY INSOMNIA: ICD-10-CM

## 2025-04-07 DIAGNOSIS — F22 DELUSION (HCC): Primary | ICD-10-CM

## 2025-04-07 DIAGNOSIS — N18.2 CKD (CHRONIC KIDNEY DISEASE) STAGE 2, GFR 60-89 ML/MIN: ICD-10-CM

## 2025-04-07 DIAGNOSIS — E78.2 MIXED HYPERLIPIDEMIA: ICD-10-CM

## 2025-04-07 DIAGNOSIS — Z86.718 HISTORY OF DVT (DEEP VEIN THROMBOSIS): ICD-10-CM

## 2025-04-07 DIAGNOSIS — E10.65 TYPE 1 DIABETES MELLITUS WITH HYPERGLYCEMIA (HCC): ICD-10-CM

## 2025-04-07 DIAGNOSIS — I25.10 CORONARY ARTERY DISEASE INVOLVING NATIVE HEART WITHOUT ANGINA PECTORIS, UNSPECIFIED VESSEL OR LESION TYPE: ICD-10-CM

## 2025-04-07 DIAGNOSIS — F41.9 ANXIETY: ICD-10-CM

## 2025-04-07 DIAGNOSIS — I73.9 PERIPHERAL VASCULAR DISEASE, UNSPECIFIED (HCC): ICD-10-CM

## 2025-04-07 DIAGNOSIS — N40.1 BENIGN PROSTATIC HYPERPLASIA WITH NOCTURIA: ICD-10-CM

## 2025-04-07 DIAGNOSIS — F09 MILD COGNITIVE DISORDER: ICD-10-CM

## 2025-04-07 DIAGNOSIS — R35.1 BENIGN PROSTATIC HYPERPLASIA WITH NOCTURIA: ICD-10-CM

## 2025-04-07 DIAGNOSIS — R13.19 OTHER DYSPHAGIA: ICD-10-CM

## 2025-04-07 DIAGNOSIS — I25.2 HX OF NON-ST ELEVATION MYOCARDIAL INFARCTION (NSTEMI): ICD-10-CM

## 2025-04-07 DIAGNOSIS — I95.1 ORTHOSTATIC HYPOTENSION: ICD-10-CM

## 2025-04-07 PROCEDURE — 99309 SBSQ NF CARE MODERATE MDM 30: CPT | Performed by: STUDENT IN AN ORGANIZED HEALTH CARE EDUCATION/TRAINING PROGRAM

## 2025-04-09 PROBLEM — R35.0 URINARY FREQUENCY: Status: RESOLVED | Noted: 2023-06-05 | Resolved: 2025-04-09

## 2025-04-09 PROBLEM — B37.0 ORAL THRUSH: Status: RESOLVED | Noted: 2025-01-05 | Resolved: 2025-04-09

## 2025-04-09 PROBLEM — R00.0 SINUS TACHYCARDIA: Status: RESOLVED | Noted: 2019-12-13 | Resolved: 2025-04-09

## 2025-04-09 NOTE — ASSESSMENT & PLAN NOTE
Lab Results   Component Value Date    HGBA1C 7.1 (H) 07/01/2024     Monitor A1c routinely - recently stable ~8.4  Monitor glucose - there was some recent AM hypoglycemia in early March 2025 requiring reduction in his insulin dosing and updating of hold parameters. Sugars remain somewhat labile throughout day ranging around low 100s-low 300s, at times do drop below 100. Highest readings tend to occur around/after dinner time/late evening.  Avoid hypoglycemia  Continue insulin aspart with sliding scale TIDAC+HS  Continue insulin aspart 12 units with breakfast/lunch, 10 units with dinner  Continue lantus 10 u daily  Adjust/titrate regimen as appropriate with more data  Following Endocrinology, recent visit Dec 2024, next May 2025  Follow up with Ophthalmology/Podiatry as appropriate

## 2025-04-09 NOTE — PROGRESS NOTES
[facility]  Facility: [facility]    PROGRESS NOTE  Nursing Home Place of Service: nursing home place of service: POS 32 Unskilled- No Part A Coverage    NAME: Tuan Remy  : 1955 AGE: 70 y.o. SEX: male MRN: [id number]  DATE OF ENCOUNTER: 2025    Assessment and Plan     Problem List Items Addressed This Visit       Hyperlipidemia    lipid panel (23)  cholesterol-109  triglycerides-61  LDL-53  HDL-44  Being treated with a high-intensity statin d/t prior history of a NSTEMI, PVD, and CAD  Continue atorvastatin 80 mg po daily, appears to be tolerating well  Goal LDL <70 per Cardiology         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HCC)    Following Heme/Onc at [facility], recent visit 2025  Was treated in  with 2-CDA  Completed 2 years of zoledronic acid  Considered stable per Heme/Onc, no evidence of recurrent disease  No apparent acute/associated symptoms presently         Orthostatic hypotension    Denies any recent notable orthostasis/worsening symptoms. Has mild stable intermittent orthostasis he manages by changing positions slowly  Appears he used to be on midodrine in the past. Monitor for need.  Monitor vitals. BP recently generally stable around 100s-130s systolic  Encourage fall precautions, appropriate DME use, slow positional changes, adequate PO hydration  Following Cardiology outpatient          Peripheral vascular disease, unspecified (HCC)    Noted history  No apparent acute/associated concerns, seems stable  Monitor skin for breakdown, wounds  Continue aspirin, statin         Type 1 diabetes mellitus with hyperglycemia ([facility])      Lab Results   Component Value Date    HGBA1C 7.1 (H) 2024     Monitor A1c routinely - recently stable ~8.4  Monitor glucose - there was some recent AM hypoglycemia in early 2025 requiring reduction in his insulin dosing and updating of hold parameters. Sugars remain somewhat labile throughout day ranging around low 100s-low 300s,  at times do drop below 100. Highest readings tend to occur around/after dinner time/late evening.  Avoid hypoglycemia  Continue insulin aspart with sliding scale TIDAC+HS  Continue insulin aspart 12 units with breakfast/lunch, 10 units with dinner  Continue lantus 10 u daily  Adjust/titrate regimen as appropriate with more data  Following Endocrinology, recent visit Dec 2024, next May 2025  Follow up with Ophthalmology/Podiatry as appropriate           Anxiety    Noted hx  Mood stable, good spirits  Monitor off anxiolytic meds  Supportive care  Psych following at facility         Coronary artery disease without angina pectoris    Lexiscan stress test:  1. Infarct involving the left circumflex territory/lateral wall with moderate  sonam-infarct ischemia.  2. Normal left ventricle systolic function with ejection fraction of 55%.      Recent Cardiology visit Feb 2025, considered stable, To continue aspirin, statin.  Avoided beta blocker/ACE-I with hx of orthostatic hypotension.   No acute cardiac complaints, seems stable  Continue aspirin, statin         History of DVT (deep vein thrombosis)    Follows Heme/Onc at [facility]  Hx of unprovoked VTE  To continue Eliquis for AC (lifelong per Hematology recommendation)         CKD (chronic kidney disease) stage 2, GFR 60-89 ml/min    Lab Results   Component Value Date    EGFR 56 (L) 09/04/2024    EGFR 78 07/01/2024    EGFR 76 04/10/2024    CREATININE 1.37 (H) 09/04/2024    CREATININE 1.04 07/01/2024    CREATININE 1.06 04/10/2024     Lab Results   Component Value Date    EGFR 56 (L) 09/04/2024    EGFR 78 07/01/2024    EGFR 76 04/10/2024    CREATININE 1.37 (H) 09/04/2024    CREATININE 1.04 07/01/2024    CREATININE 1.06 04/10/2024     Monitor renal function on routine labs - stable in baseline range  Avoid nephrotoxins, NSAIDs as able  Encourage PO hydration, respecting volume status           Hx of non-ST elevation myocardial infarction (NSTEMI)    See plan under CAD          Mild  "cognitive disorder    Noted hx of cognitive impairment  Patient Aox3 and seems to be a fair historian though a bit forgetful and with some mild paranoia in his statements at times, mentation seems stable  May require redirection and reorientation  Encourage participation with group activities  Encourage staying mentally active with word searches, crossword puzzles, and suduko   Avoid deliriogenic medications   Continue 24/7 [facility] at [facility]  Psych team following at facility  Has established with Neurology as of March 2025 with further neuro workup pending         Primary insomnia    Stable  Continue melatonin  Recently started on quetiapine as per Psych  Trazodone was tried and weaned off as seemed ineffective  Supportive care, sleep hygiene         Benign prostatic hyperplasia with nocturia    BPH with LUTS  Following Urology outpatient, recent visit Oct 2024  Started on flomax 0.4mg in Jan 2024  Monitor PSA routinely - WNL though some mild uptrend noted, continue to monitor, as per Urology check yearly before next Urology visit  Seems symptomatically stable  CT abdomen pelvis completed on 09/04 during ED evaluation  Small enhancing nodule at the bladder base in the region of the trigone unchanged from prior         Delusion (HCC) - Primary    Patient with some evident delusions since at least several months mainly focused on the idea that someone I.e. \"the \" or \"the \", someone named \"Axel\", at facility is telling him he can or cannot do certain things such as leave his room at times or else he will not get his food  He otherwise seems to be a fair historian and easily redirectable and does not seem to hold any other major delusions or note any hallucinations or other concerning psychiatric symptoms.  Psych hx mainly significant for anxiety/depression and insomnia  The delusion issue seems stable recently, perhaps a bit improved in fact  Psychiatry following at facility  Presently is on quetiapine " 50mg qHS recently started and being gradually titrated as per Psych recommendation  Continue to monitor for acute change - seems stable overall, possibly a bit better overall since being on seroquel, does not seem worse  Supportive care  Consider further workup/intervention in conjunction with Psych team if issue persists/worsens.  Has established with Neurology March 2025 with further workup/neuroimaging pending. Recent labs as of March 2025 appearing generally reassuring.         Other dysphagia    There had been concern of dysphagia in recent months with some associated odynophagia at the time  Around same timeframe oral thrush was noted and treated with nystatin - since resolved  The dysphagia since appears to have resolved. Patient had been scheduled with GI but this was cancelled on goals of care discussion recently as the symptoms seemed resolved.  Aspiration precautions  Speech Therapy following at facility  Underwent VFSS on 2/3/25 - No notable oral or pharyngeal phase abnormality, had trace thin liquid penetration, concern for possible esophageal dysphagia. recommended continue mech soft/thins and OK to upgrade as tolerated/as cleared by Speech Therapy  Continue to monitor - overall symptomatically seems improved/resolved from prior per patient (may have had component of thrush causing worse symptoms previously)                                Chief Complaint     Follow up 30 day    History of Present Illness     Tuan Remy is a 70 y.o. male who was seen today for follow up. PMH including type 1 DM, orthostatic hypotension, peripheral vascular disease, atherosclerotic heart disease, hyperlipidemia, LCH, hyperlipidemia   Code Status: Full     Patient seen and examined in room  Others present: none  Patient laying in bed able to reposition independently  Appears comfortable, awake, alert, oriented to situation, able to converse appropriately  Polite, Aox3 though a bit forgetful of details, in good spirits,  appearing similar to my prior visit in terms of mentation. Notes he feels well overall, with no acute concerns at this time.   No acute pain anywhere. Endorses good appetite, no sore throat or dysphagia (prior concern has been resolved without recurrence). Denies any acute GI symptoms, endorses regular BM, no abd pain, no N/V/C/D. Breathing fine, no orthopnea/SOB/cough. No CP/palpitations; he has chronic intermittent orthostatic lightheadedness which is mild/stable, he is aware to get up slowly. No acute cardiac, abdominal, or urinary symptoms; see ROS for more details. He does not feel feverish or sick or confused. Gets around mainly with walker and feels steady with this. Feels his mood is good lately. He does again make some mildly paranoid/delusional statements on exam today of the type that there are various people at the facility who threaten to not give him his food or water and do not allow him to leave his room or use his walker as he likes.    No further questions or acute concerns identified.  No further acute concerns per nursing. Per nursing patient with stable occasional delusional statements and no noted aggressive behaviors.     Lab Review:   9/14/23: A1c 7.5 (improved from 7.9 in July 2023)  12/28/23: PSA WNL (2.45)  1/29/24: CBC with Hb 11.8; CMP generally non-actionable, glu 50, eGFR 75; last TSH WNL from April 2023  4/10/24: CBC with Hb 11.4; BMP generally stable/non-actionable, eGFR 76; A1c 7.7  6/24/24: PSA WNL (3.42)  7/1/24: CBC with Hb 12.0; BMP generally stable/non-actionable, eGFR 78; LDL 52; TSH WNL; A1c 7.1  7/24/24: PSA 3.52 (WNL)  9/4/24: CMP with Cr 1.37, GFR 56 (baseline 60s-70s); CBC generally stable/non-actionable, Hb 12.3; blood cultures NGTD; urine culture with no notable growth  10/30/24: BMP generally stable/non-actionable, Cr 1.05, eGFR 77; CBC with Hb 11.7; A1c 8.4  12/20/24: vit D 30, B12 301  12/26/24: respiratory viral panel negative  12/27/24: CBC with Hb 11.9  "(normocytic); BMP with Cr 1.19, eGFR 66  1/29/25: CBC with Hb 12.2; BMP with Cr 1.02, GFR 80; A1c 8.4  3/26/25: CBC with Hb 10.7 (normocytic); CMP with Cr 1.11, GFR 72; TSH WNL; B12 WNL, folate WNL, MMA WNL; syphilis nonreactive;       Lab Results   Component Value Date    HGBA1C 7.1 (H) 07/01/2024     Lab Results   Component Value Date    TSH 2.25 07/01/2024     No results found for: \"NMTQAUVL32\"  Lab Results   Component Value Date    FERRITIN 339 (H) 04/28/2020     No results found for: \"CHOLESTEROL\"  No results found for: \"HDL\"  No results found for: \"TRIG\"  No results found for: \"NONHDLC\"  No results found for: \"LDLCALC\"        FL barium swallow video w speech  Result Date: 2/3/2025  IMPRESSION: Penetration of thin consistency. No aspiration.     CTA abdomen pelvis w wo contrast  Result Date: 9/4/2024  1. No acute mass nor inflammatory change in the abdomen. Solid organs grossly stable. No evidence of abscess given history 2. Chronic circumferential distal esophageal wall thickening. Please correlate for chronic reflux esophagitis. 3. Chronic marked rectal fecal retention/possible impaction. Mass effect on the bladder. 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 4. Small enhancing nodule at the bladder base in the region of the trigone unchanged from prior see above comments.     CT head wo contrast  Result Date: 9/4/2024  1.  No acute intracranial findings: No large acute transcortical infarct or intracranial hemorrhage. 2.  Mild white matter changes, which are nonspecific commonly seen with chronic microvascular ischemic change.    XR chest portable  Result Date: 9/4/2024  Impression: No acute pathology visualized.           Echo Oct 2022: EF 60-65, normal diastolic function  EKG 12/20/24 \"sinus rhythm. Normal ECG. Intervals: HR: 75 bpm MI: 123 ms QRS: 101 ms QT: 383 ms RR: 800 ms QTC: 430 ms Axes: P: 34 Deg QRS: 15 Deg T: 37 Deg\"       The following portions of the patient's history were reviewed and updated as " appropriate: allergies, current medications, past family history, past medical history, past social history, past surgical history and problem list.    Review of Systems     Review of Systems   Constitutional:  Negative for appetite change, chills, diaphoresis and fever.   HENT:  Negative for drooling, ear pain, hearing loss, rhinorrhea, sore throat and trouble swallowing.    Eyes:  Negative for pain, discharge, redness, itching and visual disturbance.   Respiratory:  Negative for cough, chest tightness, shortness of breath and wheezing.    Cardiovascular:  Negative for chest pain, palpitations and leg swelling (mild, baseline).   Gastrointestinal:  Negative for abdominal pain, blood in stool, constipation, diarrhea, nausea and vomiting.   Genitourinary:  Negative for difficulty urinating, dysuria, flank pain and hematuria.   Musculoskeletal:  Positive for gait problem. Negative for arthralgias, back pain and neck pain.   Skin:  Negative for color change.   Neurological:  Positive for tremors (stable). Negative for dizziness, facial asymmetry, speech difficulty, weakness, light-headedness (none acute; chronic intermittent orthostatic lightheadedness if rising too fast) and headaches.   Psychiatric/Behavioral:  Negative for agitation, behavioral problems and confusion. The patient is not nervous/anxious and is not hyperactive.        Active Problem List     Patient Active Problem List   Diagnosis    Ambulatory dysfunction    Blurry vision, right eye    Hyperlipidemia    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HCC)    Orthostatic hypotension    Peripheral vascular disease, unspecified (HCC)    Type 1 diabetes mellitus with hyperglycemia (HCC)    Anxiety    Reactive depression    Coronary artery disease without angina pectoris    History of DVT (deep vein thrombosis)    Hyperlipidemia due to type 1 diabetes mellitus  (HCC)    Other constipation    CKD (chronic kidney disease) stage 2, GFR 60-89 ml/min    Hx of non-ST elevation  myocardial infarction (NSTEMI)    Mild cognitive disorder    Primary insomnia    Benign prostatic hyperplasia with nocturia    Advance care planning    Falls    Delusion (HCC)    Colonoscopy refused    Other dysphagia       Objective     Vital Signs:     BP: 125/69 mmHg  4/4/2025 16:54 Temp:98.7 °F  1/4/2025 01:15 Pulse:76 bpm  4/4/2025 09:14 Weight:191.4 Lbs  4/2/2025 05:57   Resp:18 Breaths/min  1/3/2025 02:14 BS:368 mg/dL   O2:96 %  1/2/2025 14:25 Pain:0         Physical Exam  Vitals reviewed.   Constitutional:       General: He is not in acute distress.     Appearance: He is not toxic-appearing or diaphoretic.   HENT:      Head: Normocephalic and atraumatic.      Right Ear: External ear normal.      Left Ear: External ear normal.      Nose: Nose normal. No rhinorrhea.      Mouth/Throat:      Mouth: Mucous membranes are dry.      Pharynx: Oropharynx is clear. No oropharyngeal exudate or posterior oropharyngeal erythema.   Eyes:      General: No scleral icterus.        Right eye: No discharge.         Left eye: No discharge.      Extraocular Movements: Extraocular movements intact.      Conjunctiva/sclera: Conjunctivae normal.      Pupils: Pupils are equal, round, and reactive to light.   Cardiovascular:      Rate and Rhythm: Normal rate and regular rhythm.   Pulmonary:      Effort: Pulmonary effort is normal. No respiratory distress.      Breath sounds: No wheezing or rales.   Abdominal:      General: Bowel sounds are normal. There is no distension.      Palpations: Abdomen is soft.      Tenderness: There is no abdominal tenderness. There is no guarding.   Musculoskeletal:         General: No tenderness.      Cervical back: No rigidity.      Comments: Trace bilateral LE edema  No calf tenderness   Skin:     General: Skin is warm and dry.      Coloration: Skin is not jaundiced.   Neurological:      General: No focal deficit present.      Mental Status: He is alert and oriented to person, place, and time. Mental  status is at baseline.   Psychiatric:         Mood and Affect: Mood normal.         Behavior: Behavior normal.         Thought Content: Thought content normal.         Pertinent Laboratory/Diagnostic Studies:  Laboratory and Imaging studies reviewed. Full report in the paper chart.     Current Medications   Medications reviewed and updated in facility chart.      -Total time spent on this encounter today including documentation and workup review, face to face time, history and exam, and documentation/orders, was approximately 45 minutes.  -This note will be copied to [facility] EMR where vitals and medication orders are placed.    Ryan Chester D.O.  Geriatric Medicine  4/9/2025 6:00 AM

## 2025-04-09 NOTE — ASSESSMENT & PLAN NOTE
Following Heme/Onc at [facility], recent visit Feb 2025  Was treated in 2013 with 2-CDA  Completed 2 years of zoledronic acid  Considered stable per Heme/Onc, no evidence of recurrent disease  No apparent acute/associated symptoms presently

## 2025-04-09 NOTE — ASSESSMENT & PLAN NOTE
"Patient with some evident delusions since at least several months mainly focused on the idea that someone I.e. \"the \" or \"the \", someone named \"Axel\", at facility is telling him he can or cannot do certain things such as leave his room at times or else he will not get his food  He otherwise seems to be a fair historian and easily redirectable and does not seem to hold any other major delusions or note any hallucinations or other concerning psychiatric symptoms.  Psych hx mainly significant for anxiety/depression and insomnia  The delusion issue seems stable recently, perhaps a bit improved in fact  Psychiatry following at facility  Presently is on quetiapine 50mg qHS recently started and being gradually titrated as per Psych recommendation  Continue to monitor for acute change - seems stable overall, possibly a bit better overall since being on seroquel, does not seem worse  Supportive care  Consider further workup/intervention in conjunction with Psych team if issue persists/worsens.  Has established with Neurology March 2025 with further workup/neuroimaging pending. Recent labs as of March 2025 appearing generally reassuring.  "

## 2025-04-09 NOTE — ASSESSMENT & PLAN NOTE
There had been concern of dysphagia in recent months with some associated odynophagia at the time  Around same timeframe oral thrush was noted and treated with nystatin - since resolved  The dysphagia since appears to have resolved. Patient had been scheduled with GI but this was cancelled on goals of care discussion recently as the symptoms seemed resolved.  Aspiration precautions  Speech Therapy following at facility  Underwent VFSS on 2/3/25 - No notable oral or pharyngeal phase abnormality, had trace thin liquid penetration, concern for possible esophageal dysphagia. recommended continue mech soft/thins and OK to upgrade as tolerated/as cleared by Speech Therapy  Continue to monitor - overall symptomatically seems improved/resolved from prior per patient (may have had component of thrush causing worse symptoms previously)

## 2025-04-09 NOTE — ASSESSMENT & PLAN NOTE
Noted hx of cognitive impairment  Patient Aox3 and seems to be a fair historian though a bit forgetful and with some mild paranoia in his statements at times, mentation seems stable  May require redirection and reorientation  Encourage participation with group activities  Encourage staying mentally active with word searches, crossword puzzles, and suduko   Avoid deliriogenic medications   Continue 24/7 supportive care at [facility]  Psych team following at facility  Has established with Neurology as of March 2025 with further neuro workup pending

## 2025-05-07 ENCOUNTER — NURSING HOME VISIT (OUTPATIENT)
Dept: GERIATRICS | Facility: OTHER | Age: 70
End: 2025-05-07
Payer: MEDICARE

## 2025-05-07 DIAGNOSIS — R35.1 BENIGN PROSTATIC HYPERPLASIA WITH NOCTURIA: ICD-10-CM

## 2025-05-07 DIAGNOSIS — F09 MILD COGNITIVE DISORDER: ICD-10-CM

## 2025-05-07 DIAGNOSIS — Z86.718 HISTORY OF DVT (DEEP VEIN THROMBOSIS): ICD-10-CM

## 2025-05-07 DIAGNOSIS — F51.01 PRIMARY INSOMNIA: ICD-10-CM

## 2025-05-07 DIAGNOSIS — F22 DELUSION (HCC): ICD-10-CM

## 2025-05-07 DIAGNOSIS — F41.9 ANXIETY: ICD-10-CM

## 2025-05-07 DIAGNOSIS — N40.1 BENIGN PROSTATIC HYPERPLASIA WITH NOCTURIA: ICD-10-CM

## 2025-05-07 DIAGNOSIS — E78.2 MIXED HYPERLIPIDEMIA: ICD-10-CM

## 2025-05-07 DIAGNOSIS — I73.9 PERIPHERAL VASCULAR DISEASE, UNSPECIFIED (HCC): ICD-10-CM

## 2025-05-07 DIAGNOSIS — I25.2 HX OF NON-ST ELEVATION MYOCARDIAL INFARCTION (NSTEMI): ICD-10-CM

## 2025-05-07 DIAGNOSIS — I25.10 CORONARY ARTERY DISEASE INVOLVING NATIVE HEART WITHOUT ANGINA PECTORIS, UNSPECIFIED VESSEL OR LESION TYPE: ICD-10-CM

## 2025-05-07 DIAGNOSIS — I95.1 ORTHOSTATIC HYPOTENSION: ICD-10-CM

## 2025-05-07 DIAGNOSIS — N18.2 CKD (CHRONIC KIDNEY DISEASE) STAGE 2, GFR 60-89 ML/MIN: ICD-10-CM

## 2025-05-07 DIAGNOSIS — C96.6 LANGERHAN'S CELL HISTIOCYTOSIS (HCC): ICD-10-CM

## 2025-05-07 DIAGNOSIS — E10.65 TYPE 1 DIABETES MELLITUS WITH HYPERGLYCEMIA (HCC): Primary | ICD-10-CM

## 2025-05-07 PROCEDURE — 99309 SBSQ NF CARE MODERATE MDM 30: CPT | Performed by: STUDENT IN AN ORGANIZED HEALTH CARE EDUCATION/TRAINING PROGRAM

## 2025-05-07 NOTE — PROGRESS NOTES
[facility]  Facility: [facility]    PROGRESS NOTE  Nursing Home Place of Service: nursing home place of service: POS 32 Unskilled- No Part A Coverage    NAME: Tuan Remy  : 1955 AGE: 70 y.o. SEX: male MRN: [id number]  DATE OF ENCOUNTER: 2025    Assessment and Plan     Problem List Items Addressed This Visit       Hyperlipidemia    lipid panel (23)  cholesterol-109  triglycerides-61  LDL-53  HDL-44  Being treated with a high-intensity statin d/t prior history of a NSTEMI, PVD, and CAD  Continue atorvastatin 80 mg po daily, appears to be tolerating well  Goal LDL <70 per Cardiology         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HCC)    Following Heme/Onc at [facility], recent visit 2025  Was treated in  with 2-CDA  Completed 2 years of zoledronic acid  Considered stable per Heme/Onc, no evidence of recurrent disease  No apparent acute/associated symptoms presently         Orthostatic hypotension    Denies any recent notable orthostasis/worsening symptoms. Has mild stable intermittent orthostasis he manages by changing positions slowly  Appears he used to be on midodrine in the past. Monitor for need.  Monitor vitals. BP recently generally stable around 100s-130s systolic  Encourage fall precautions, appropriate DME use, slow positional changes, adequate PO hydration  Following Cardiology outpatient          Peripheral vascular disease, unspecified (HCC)    Noted history  No apparent acute/associated concerns, seems stable  Monitor skin for breakdown, wounds  Continue aspirin, statin         Type 1 diabetes mellitus with hyperglycemia (HCC) - Primary      Lab Results   Component Value Date    HGBA1C 7.1 (H) 2024     Monitor A1c routinely - recently bit worsened 9.2  Monitor glucose - there was some recent AM hypoglycemia in early 2025 requiring reduction in his insulin dosing and updating of hold parameters. Sugars remain labile throughout day ranging around low 100s-high  300s  Avoid hypoglycemia - prior hypoglycemia improved  Continue insulin aspart with sliding scale TIDAC+HS  Continue insulin aspart, will increase to 14u with breakfast/lunch/dinner with hold parameters  Continue lantus, will increase from 10 to 12u daily  Adjust/titrate regimen as appropriate with more data  Following Endocrinology outpatient next visit June 2025  Follow up with Ophthalmology/Podiatry as appropriate           Anxiety    Noted hx  Mood stable, good spirits  Monitor off anxiolytic meds  Supportive care  Psych following at facility         Coronary artery disease without angina pectoris    Lexiscan stress test:  1. Infarct involving the left circumflex territory/lateral wall with moderate  sonam-infarct ischemia.  2. Normal left ventricle systolic function with ejection fraction of 55%.      Recent Cardiology visit Feb 2025, considered stable  Avoided beta blocker/ACE-I with hx of orthostatic hypotension.   No acute cardiac complaints, seems stable  Continue aspirin, statin         History of DVT (deep vein thrombosis)    Follows Heme/Onc at [facility]  Hx of unprovoked VTE  To continue Eliquis for AC (lifelong per Hematology recommendation)         CKD (chronic kidney disease) stage 2, GFR 60-89 ml/min    Lab Results   Component Value Date    EGFR 56 (L) 09/04/2024    EGFR 78 07/01/2024    EGFR 76 04/10/2024    CREATININE 1.37 (H) 09/04/2024    CREATININE 1.04 07/01/2024    CREATININE 1.06 04/10/2024     Lab Results   Component Value Date    EGFR 56 (L) 09/04/2024    EGFR 78 07/01/2024    EGFR 76 04/10/2024    CREATININE 1.37 (H) 09/04/2024    CREATININE 1.04 07/01/2024    CREATININE 1.06 04/10/2024     Monitor renal function on routine labs - stable in baseline range  Avoid nephrotoxins, NSAIDs as able  Encourage PO hydration, respecting volume status           Hx of non-ST elevation myocardial infarction (NSTEMI)    See plan under CAD          Mild cognitive disorder    Noted hx of cognitive  "impairment  Patient Aox3 and seems to be a fair historian though a bit forgetful and with some mild paranoia in his statements at times, mentation seems stable  May require redirection and reorientation  Encourage participation with group activities  Encourage staying mentally active with word searches, crossword puzzles, and suduko   Avoid deliriogenic medications   Continue 24/7 supportive care at [facility]  Psych team following at facility  Has established with Neurology as of March 2025 with further neuro workup pending         Primary insomnia    Stable  Continue melatonin  Recently started on quetiapine as per Psych  Trazodone was tried and weaned off as seemed ineffective  Supportive care, sleep hygiene         Benign prostatic hyperplasia with nocturia    BPH with LUTS  Following Urology outpatient, recent visit Oct 2024  Started on flomax 0.4mg in Jan 2024  Monitor PSA routinely - WNL though some mild uptrend noted, continue to monitor, as per Urology check yearly before next Urology visit  Seems symptomatically stable  CT abdomen pelvis completed on 09/04 during ED evaluation  Small enhancing nodule at the bladder base in the region of the trigone unchanged from prior         Delusion (HCC)    Patient with some evident delusions since at least several months mainly focused on the idea that someone I.e. \"the \" or \"the \", someone named \"Axel\", at facility is telling him he can or cannot do certain things such as leave his room at times or else he will not get his food  He otherwise seems to be a fair historian and easily redirectable and does not seem to hold any other major delusions or note any hallucinations or other concerning psychiatric symptoms.  Psych hx mainly significant for anxiety/depression and insomnia  The delusion issue seems stable recently, perhaps a bit improved in fact  Psychiatry following at facility  Presently is on quetiapine 50mg qHS recently started and being gradually " "titrated as per Psych recommendation  Continue to monitor for acute change - seems stable overall, possibly a bit better overall since being on seroquel, does not seem worse  Supportive care  Consider further workup/intervention in conjunction with Psych team if issue persists/worsens.  Has established with Neurology March 2025 with further workup/neuroimaging pending. Recent labs as of March 2025 appearing generally reassuring.                                  Chief Complaint     Follow up 60 day    History of Present Illness     Tuan Remy is a 70 y.o. male who was seen today for follow up. PMH including type 1 DM, orthostatic hypotension, peripheral vascular disease, atherosclerotic heart disease, hyperlipidemia, LCH, hyperlipidemia   Code Status: Full     Patient seen and examined in room  Others present: none  Patient seated on bed, able to reposition/stand independently  Appears comfortable, awake, alert, oriented to situation, able to converse appropriately  Polite, Aox3 though a bit forgetful of details, in good spirits, appearing similar to my prior visit in terms of mentation. Notes he feels well overall, with no acute concerns recently.   No acute pain anywhere. Endorses good appetite, no recent sore throat or dysphagia (prior concern has been resolved without recurrence). Denies any acute GI symptoms, endorses regular BM, no abd pain, no N/V/C/D. Breathing fine, no orthopnea/SOB/cough. No CP/palpitations; he has chronic intermittent orthostatic lightheadedness which is mild/stable, he has not felt any recently, he is aware to get up slowly. No acute cardiac, abdominal, or urinary symptoms; see ROS for more details. He does not feel feverish or sick or confused. Gets around mainly with walker and feels steady with this. Feels his mood is good lately. He does again make some mildly paranoid/delusional statements to the effect that there are various people at the facility like \"the \" who threaten to " "not give him his food or water and do not allow him to leave his room or use his walker as he likes, or get upset if he eats too early, but states he is not bothered by this and it is not a major concern. Per discussion patient notes he never smoked and reiterates desire to not have any colonoscopy/colon cancer screen.      No further questions or acute concerns identified.  No further acute concerns per nursing. Per nursing patient with stable occasional delusional statements and no noted aggressive behaviors.     Lab Review:   9/14/23: A1c 7.5 (improved from 7.9 in July 2023)  12/28/23: PSA WNL (2.45)  1/29/24: CBC with Hb 11.8; CMP generally non-actionable, glu 50, eGFR 75; last TSH WNL from April 2023  4/10/24: CBC with Hb 11.4; BMP generally stable/non-actionable, eGFR 76; A1c 7.7  6/24/24: PSA WNL (3.42)  7/1/24: CBC with Hb 12.0; BMP generally stable/non-actionable, eGFR 78; LDL 52; TSH WNL; A1c 7.1  7/24/24: PSA 3.52 (WNL)  9/4/24: CMP with Cr 1.37, GFR 56 (baseline 60s-70s); CBC generally stable/non-actionable, Hb 12.3; blood cultures NGTD; urine culture with no notable growth  10/30/24: BMP generally stable/non-actionable, Cr 1.05, eGFR 77; CBC with Hb 11.7; A1c 8.4  12/20/24: vit D 30, B12 301  12/26/24: respiratory viral panel negative  12/27/24: CBC with Hb 11.9 (normocytic); BMP with Cr 1.19, eGFR 66  1/29/25: CBC with Hb 12.2; BMP with Cr 1.02, GFR 80; A1c 8.4  3/26/25: CBC with Hb 10.7 (normocytic); CMP with Cr 1.11, GFR 72; TSH WNL; B12 WNL, folate WNL, MMA WNL; syphilis nonreactive  4/30/25: CBC with Hb 12.6; BMP with GFR 80; A1c 9.2      Lab Results   Component Value Date    HGBA1C 7.1 (H) 07/01/2024     Lab Results   Component Value Date    TSH 2.25 07/01/2024     No results found for: \"MRCIQAOO82\"  Lab Results   Component Value Date    FERRITIN 339 (H) 04/28/2020     No results found for: \"CHOLESTEROL\"  No results found for: \"HDL\"  No results found for: \"TRIG\"  No results found for: " "\"NONHDLC\"  No results found for: \"LDLCALC\"        FL barium swallow video w speech  Result Date: 2/3/2025  IMPRESSION: Penetration of thin consistency. No aspiration.     CTA abdomen pelvis w wo contrast  Result Date: 9/4/2024  1. No acute mass nor inflammatory change in the abdomen. Solid organs grossly stable. No evidence of abscess given history 2. Chronic circumferential distal esophageal wall thickening. Please correlate for chronic reflux esophagitis. 3. Chronic marked rectal fecal retention/possible impaction. Mass effect on the bladder. 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 4. Small enhancing nodule at the bladder base in the region of the trigone unchanged from prior see above comments.     CT head wo contrast  Result Date: 9/4/2024  1.  No acute intracranial findings: No large acute transcortical infarct or intracranial hemorrhage. 2.  Mild white matter changes, which are nonspecific commonly seen with chronic microvascular ischemic change.    XR chest portable  Result Date: 9/4/2024  Impression: No acute pathology visualized.           Echo Oct 2022: EF 60-65, normal diastolic function  EKG 12/20/24 \"sinus rhythm. Normal ECG. Intervals: HR: 75 bpm OR: 123 ms QRS: 101 ms QT: 383 ms RR: 800 ms QTC: 430 ms Axes: P: 34 Deg QRS: 15 Deg T: 37 Deg\"       The following portions of the patient's history were reviewed and updated as appropriate: allergies, current medications, past family history, past medical history, past social history, past surgical history and problem list.    Review of Systems     Review of Systems   Constitutional:  Negative for activity change, appetite change, chills, diaphoresis, fatigue and fever.   HENT:  Negative for drooling, ear pain, hearing loss, rhinorrhea, sore throat and trouble swallowing.    Eyes:  Negative for pain, discharge, redness, itching and visual disturbance.   Respiratory:  Negative for cough, chest tightness, shortness of breath and wheezing.    Cardiovascular:  " Negative for chest pain, palpitations and leg swelling (mild, baseline).   Gastrointestinal:  Negative for abdominal pain, blood in stool, constipation, diarrhea, nausea and vomiting.   Genitourinary:  Negative for difficulty urinating, dysuria, flank pain and hematuria.   Musculoskeletal:  Positive for gait problem. Negative for arthralgias, back pain and neck pain.   Skin:  Negative for color change.   Neurological:  Positive for tremors (stable). Negative for dizziness, facial asymmetry, speech difficulty, weakness, light-headedness (none acute; chronic intermittent orthostatic lightheadedness if rising too fast) and headaches.   Psychiatric/Behavioral:  Negative for agitation, behavioral problems and confusion. The patient is not nervous/anxious and is not hyperactive.        Active Problem List     Patient Active Problem List   Diagnosis    Ambulatory dysfunction    Blurry vision, right eye    Hyperlipidemia    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HCC)    Orthostatic hypotension    Peripheral vascular disease, unspecified (HCC)    Type 1 diabetes mellitus with hyperglycemia (HCC)    Anxiety    Reactive depression    Coronary artery disease without angina pectoris    History of DVT (deep vein thrombosis)    Hyperlipidemia due to type 1 diabetes mellitus  (HCC)    Other constipation    CKD (chronic kidney disease) stage 2, GFR 60-89 ml/min    Hx of non-ST elevation myocardial infarction (NSTEMI)    Mild cognitive disorder    Primary insomnia    Benign prostatic hyperplasia with nocturia    Advance care planning    Falls    Delusion ([facility])    Colonoscopy refused       Objective     Vital Signs:     BP: 122/60 mmHg  5/2/2025 16:40 Temp:97.9 °F  4/23/2025 23:38 Pulse:72 bpm  5/2/2025 08:31 Weight:188.5 Lbs  5/1/2025 14:59     Resp:20 BS:423 mg/dL  5/7/2025 11:12 O2:98 %  4/23/2025 23:38 Pain:0  5/7/2025 07:21       Physical Exam  Vitals reviewed.   Constitutional:       General: He is not in acute distress.      Appearance: He is not toxic-appearing or diaphoretic.   HENT:      Head: Normocephalic and atraumatic.      Right Ear: External ear normal.      Left Ear: External ear normal.      Nose: Nose normal. No rhinorrhea.      Mouth/Throat:      Mouth: Mucous membranes are dry.      Pharynx: Oropharynx is clear. No oropharyngeal exudate or posterior oropharyngeal erythema.   Eyes:      General: No scleral icterus.        Right eye: No discharge.         Left eye: No discharge.      Extraocular Movements: Extraocular movements intact.      Conjunctiva/sclera: Conjunctivae normal.      Pupils: Pupils are equal, round, and reactive to light.   Cardiovascular:      Rate and Rhythm: Normal rate and regular rhythm.   Pulmonary:      Effort: Pulmonary effort is normal. No respiratory distress.      Breath sounds: No wheezing or rales.   Abdominal:      General: Bowel sounds are normal. There is no distension.      Palpations: Abdomen is soft.      Tenderness: There is no abdominal tenderness. There is no guarding.   Musculoskeletal:         General: No tenderness.      Cervical back: No rigidity.      Comments: Trace bilateral LE edema  No calf tenderness   Skin:     General: Skin is warm and dry.      Coloration: Skin is not jaundiced.   Neurological:      General: No focal deficit present.      Mental Status: He is alert and oriented to person, place, and time. Mental status is at baseline.   Psychiatric:         Mood and Affect: Mood normal.         Behavior: Behavior normal.         Thought Content: Thought content normal.         Pertinent Laboratory/Diagnostic Studies:  Laboratory and Imaging studies reviewed. Full report in the paper chart.     Current Medications   Medications reviewed and updated in facility chart.      -Total time spent on this encounter today including documentation and workup review, face to face time, history and exam, and documentation/orders, was approximately 40 minutes.  -This note will be copied  to [facility] EMR where vitals and medication orders are placed.    Ryan Chester D.O.  Geriatric Medicine  5/8/2025 11:09 AM

## 2025-05-08 PROBLEM — R13.19 OTHER DYSPHAGIA: Status: RESOLVED | Noted: 2024-12-27 | Resolved: 2025-05-08

## 2025-05-08 NOTE — ASSESSMENT & PLAN NOTE
Lab Results   Component Value Date    HGBA1C 7.1 (H) 07/01/2024     Monitor A1c routinely - recently bit worsened 9.2  Monitor glucose - there was some recent AM hypoglycemia in early March 2025 requiring reduction in his insulin dosing and updating of hold parameters. Sugars remain labile throughout day ranging around low 100s-high 300s  Avoid hypoglycemia - prior hypoglycemia improved  Continue insulin aspart with sliding scale TIDAC+HS  Continue insulin aspart, will increase to 14u with breakfast/lunch/dinner with hold parameters  Continue lantus, will increase from 10 to 12u daily  Adjust/titrate regimen as appropriate with more data  Following Endocrinology outpatient next visit June 2025  Follow up with Ophthalmology/Podiatry as appropriate

## 2025-05-08 NOTE — ASSESSMENT & PLAN NOTE
Following Heme/Onc at [facility], recent visit Feb 2025  Was treated in 2013 with 2-CDA  Completed 2 years of zoledronic acid  Considered stable per Heme/Onc, no evidence of recurrent disease  No apparent acute/associated symptoms presently

## 2025-05-08 NOTE — ASSESSMENT & PLAN NOTE
Lexiscan stress test:  1. Infarct involving the left circumflex territory/lateral wall with moderate  sonam-infarct ischemia.  2. Normal left ventricle systolic function with ejection fraction of 55%.      Recent Cardiology visit Feb 2025, considered stable  Avoided beta blocker/ACE-I with hx of orthostatic hypotension.   No acute cardiac complaints, seems stable  Continue aspirin, statin

## 2025-05-10 ENCOUNTER — TELEPHONE (OUTPATIENT)
Dept: OTHER | Facility: OTHER | Age: 70
End: 2025-05-10

## 2025-05-10 NOTE — TELEPHONE ENCOUNTER
Leeann from [facility] called reporting pt blood sugar is 460. She would like to know if there is any recommendations or insulin change.    On call notified via epic chat

## 2025-06-06 ENCOUNTER — NURSING HOME VISIT (OUTPATIENT)
Dept: GERIATRICS | Facility: OTHER | Age: 70
End: 2025-06-06

## 2025-06-06 DIAGNOSIS — I95.1 ORTHOSTATIC HYPOTENSION: ICD-10-CM

## 2025-06-06 DIAGNOSIS — Z86.718 HISTORY OF DVT (DEEP VEIN THROMBOSIS): ICD-10-CM

## 2025-06-06 DIAGNOSIS — F22 DELUSION (HCC): ICD-10-CM

## 2025-06-06 DIAGNOSIS — F41.9 ANXIETY: ICD-10-CM

## 2025-06-06 DIAGNOSIS — F51.01 PRIMARY INSOMNIA: ICD-10-CM

## 2025-06-06 DIAGNOSIS — M25.561 CHRONIC PAIN OF RIGHT KNEE: ICD-10-CM

## 2025-06-06 DIAGNOSIS — I25.10 CORONARY ARTERY DISEASE INVOLVING NATIVE HEART WITHOUT ANGINA PECTORIS, UNSPECIFIED VESSEL OR LESION TYPE: ICD-10-CM

## 2025-06-06 DIAGNOSIS — I73.9 PERIPHERAL VASCULAR DISEASE, UNSPECIFIED (HCC): ICD-10-CM

## 2025-06-06 DIAGNOSIS — E78.2 MIXED HYPERLIPIDEMIA: ICD-10-CM

## 2025-06-06 DIAGNOSIS — N40.1 BENIGN PROSTATIC HYPERPLASIA WITH NOCTURIA: ICD-10-CM

## 2025-06-06 DIAGNOSIS — R35.1 BENIGN PROSTATIC HYPERPLASIA WITH NOCTURIA: ICD-10-CM

## 2025-06-06 DIAGNOSIS — C96.6 LANGERHAN'S CELL HISTIOCYTOSIS (HCC): ICD-10-CM

## 2025-06-06 DIAGNOSIS — G89.29 CHRONIC PAIN OF RIGHT KNEE: ICD-10-CM

## 2025-06-06 DIAGNOSIS — E10.65 TYPE 1 DIABETES MELLITUS WITH HYPERGLYCEMIA (HCC): Primary | ICD-10-CM

## 2025-06-06 DIAGNOSIS — N18.2 CKD (CHRONIC KIDNEY DISEASE) STAGE 2, GFR 60-89 ML/MIN: ICD-10-CM

## 2025-06-06 DIAGNOSIS — F09 MILD COGNITIVE DISORDER: ICD-10-CM

## 2025-06-06 DIAGNOSIS — I25.2 HX OF NON-ST ELEVATION MYOCARDIAL INFARCTION (NSTEMI): ICD-10-CM

## 2025-06-07 PROBLEM — M25.561 CHRONIC PAIN OF RIGHT KNEE: Status: ACTIVE | Noted: 2025-06-07

## 2025-06-07 PROBLEM — G89.29 CHRONIC PAIN OF RIGHT KNEE: Status: ACTIVE | Noted: 2025-06-07

## 2025-06-07 NOTE — PROGRESS NOTES
[facility]  Facility: [facility]    PROGRESS NOTE  Nursing Home Place of Service: nursing home place of service: POS 32 Unskilled- No Part A Coverage    NAME: Tuan Remy  : 1955 AGE: 70 y.o. SEX: male MRN: [id number]  DATE OF ENCOUNTER: 2025    Assessment and Plan     Problem List Items Addressed This Visit       Hyperlipidemia    lipid panel (23)  cholesterol-109  triglycerides-61  LDL-53  HDL-44  Being treated with a high-intensity statin d/t prior history of a NSTEMI, PVD, and CAD  Continue atorvastatin 80 mg po daily, appears to be tolerating well  Goal LDL <70 per Cardiology         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HCC)    Following Heme/Onc at [facility], recent visit 2025  Was treated in  with 2-CDA  Completed 2 years of zoledronic acid  Considered stable per Heme/Onc, no evidence of recurrent disease  No apparent acute/associated symptoms presently         Orthostatic hypotension    Denies any recent notable orthostasis/worsening symptoms. Has mild stable intermittent orthostasis he manages by changing positions slowly  Appears he used to be on midodrine in the past. Monitor for need.  Monitor vitals. BP recently generally stable around 100s-130s systolic  Encourage fall precautions, appropriate DME use, slow positional changes, adequate PO hydration  Following Cardiology outpatient          Peripheral vascular disease, unspecified (HCC)    Noted history  No apparent acute/associated concerns, seems stable  Monitor skin for breakdown, wounds  Continue aspirin, statin         Type 1 diabetes mellitus with hyperglycemia (HCC) - Primary      Lab Results   Component Value Date    HGBA1C 7.1 (H) 2024     Monitor A1c routinely - recently bit worsened 9.2  Monitor glucose - Sugars remain labile throughout day ranging around low 100s-low 300s. Fasting sugars generally high 100s-200s. Around evening can be below 100 into 80s  Avoid hypoglycemia - prior hypoglycemia  improved  Continue insulin aspart with sliding scale TIDAC+HS  Continue insulin aspart, will increase to 18u with breakfast/lunch, 14u with dinner with hold parameters  Continue lantus 12u daily. Will defer increasing this further for now due to some PM hypoglycemia  Adjust/titrate regimen as appropriate with more data  Following Endocrinology outpatient  Follow up with Ophthalmology/Podiatry as appropriate           Anxiety    Noted hx  Mood stable, good spirits  Monitor off anxiolytic meds  Supportive care  Psych following at facility         Coronary artery disease without angina pectoris    Lexiscan stress test:  1. Infarct involving the left circumflex territory/lateral wall with moderate  sonam-infarct ischemia.  2. Normal left ventricle systolic function with ejection fraction of 55%.      Recent Cardiology visit Feb 2025, considered stable  Avoided beta blocker/ACE-I with hx of orthostatic hypotension.   No acute cardiac complaints, seems stable  Continue aspirin, statin         History of DVT (deep vein thrombosis)    Follows Heme/Onc at [facility]  Hx of unprovoked VTE  To continue Eliquis for AC (lifelong per Hematology recommendation)         CKD (chronic kidney disease) stage 2, GFR 60-89 ml/min    Lab Results   Component Value Date    EGFR 56 (L) 09/04/2024    EGFR 78 07/01/2024    EGFR 76 04/10/2024    CREATININE 1.37 (H) 09/04/2024    CREATININE 1.04 07/01/2024    CREATININE 1.06 04/10/2024     Lab Results   Component Value Date    EGFR 56 (L) 09/04/2024    EGFR 78 07/01/2024    EGFR 76 04/10/2024    CREATININE 1.37 (H) 09/04/2024    CREATININE 1.04 07/01/2024    CREATININE 1.06 04/10/2024     Monitor renal function on routine labs - stable in baseline range  Avoid nephrotoxins, NSAIDs as able  Encourage PO hydration, respecting volume status           Hx of non-ST elevation myocardial infarction (NSTEMI)    See plan under CAD          Mild cognitive disorder    Noted hx of cognitive impairment  Patient Aox3  "and seems to be a fair historian though a bit forgetful and with some mild paranoia in his statements at times, mentation seems stable  May require redirection and reorientation  Encourage participation with group activities  Encourage staying mentally active with word searches, crossword puzzles, and suduko   Avoid deliriogenic medications   Continue 24/7 supportive care at [facility]  Psych team following at facility  Has established with Neurology as of March 2025 with further neuro workup pending         Primary insomnia    Stable  Continue melatonin  Recently started on quetiapine as per Psych  Trazodone was tried and weaned off as seemed ineffective  Supportive care, sleep hygiene         Benign prostatic hyperplasia with nocturia    BPH with LUTS  Following Urology outpatient, recent visit Oct 2024  Started on flomax 0.4mg in Jan 2024  Monitor PSA routinely - WNL though some mild uptrend noted, continue to monitor, as per Urology check yearly before next Urology visit  Seems symptomatically stable  CT abdomen pelvis completed on 09/04 during ED evaluation  Small enhancing nodule at the bladder base in the region of the trigone unchanged from prior         Delusion (HCC)    Patient with some evident delusions since at least several months mainly focused on the idea that someone I.e. \"the \" or \"the \", someone named \"Axel\", at facility is telling him he can or cannot do certain things such as leave his room at times or else he will not get his food  He otherwise seems to be a fair historian and easily redirectable and does not seem to hold any other major delusions or note any hallucinations or other concerning psychiatric symptoms.  Psych hx mainly significant for anxiety/depression and insomnia  The delusion issue seems stable recently, perhaps a bit improved in fact  Psychiatry following at facility  Presently is on quetiapine 50mg qHS recently started and being gradually titrated as per Psych " recommendation  Continue to monitor for acute change - seems stable overall, possibly a bit better overall since being on seroquel, does not seem worse  Supportive care  Consider further workup/intervention in conjunction with Psych team if issue persists/worsens.  Has established with Neurology March 2025 with further workup/neuroimaging pending. Recent labs appearing generally reassuring.         Chronic pain of right knee    Patient notes chronic R knee region pain, worse with ambulation. Although he reports it as chronic/baseline/stable, he has not mentioned this as a notable concern before. Denies any recent trauma to the area. Exam appears reassuring without signs of infection. Arguably some hypertonic musculature.  Per discussion patient does not feel he needs anything for this pain and will let us know if it worsens.  Will order XR R knee to characterize further/ Suspect arthritic etiology, no recent associated imaging on file  Supportive care  PT/OT as appropriate  Continue to monitor  Consider Orthopedics referral pending the above, may benefit from joint injection                                    Chief Complaint     Follow up 30 day    History of Present Illness     Tuan Remy is a 70 y.o. male who was seen today for follow up. PMH including type 1 DM, orthostatic hypotension, peripheral vascular disease, atherosclerotic heart disease, hyperlipidemia, LCH, hyperlipidemia   Code Status: Full     Patient seen and examined in room  Others present: none  Patient seated in chair, able to reposition/stand independently (and I have observed him ambulating independently in [place] with walker in recent days)  Appears comfortable, awake, alert, oriented to situation, able to converse appropriately  Polite, Aox3 though a bit forgetful of details, in good spirits, appearing similar to my prior visit in terms of mentation. Notes he feels well overall, with no acute concerns recently. Has good recall of recent  "Endocrine visit.  No acute pain anywhere; does endorse chronic right knee pain worse with ambulation but stable/OK overall, see plan. Endorses good appetite, no recent sore throat or dysphagia (prior concern has been resolved without recurrence). Denies any acute GI symptoms, endorses regular BM, no abd pain, no N/V/C/D. Breathing fine, no orthopnea/SOB/cough. No CP/palpitations; he has chronic intermittent orthostatic lightheadedness which is mild/stable,he is aware to get up slowly. No acute cardiac, abdominal, or urinary symptoms; see ROS for more details. He does not feel feverish or sick or confused. Gets around mainly with walker and feels steady with this. Feels his mood is good/stable lately. He does again make some mildly paranoid/delusional statements to the effect that there are various people at the facility like \"the \" or others who threaten to not give him his food or water and do not allow him to leave his room or use his walker as he likes, but that this does not bother him.      No further questions or acute concerns identified.  No further acute concerns per nursing. Per nursing patient with stable occasional delusional statements and no noted aggressive behaviors.     Lab Review:   9/14/23: A1c 7.5 (improved from 7.9 in July 2023)  12/28/23: PSA WNL (2.45)  1/29/24: CBC with Hb 11.8; CMP generally non-actionable, glu 50, eGFR 75; last TSH WNL from April 2023  4/10/24: CBC with Hb 11.4; BMP generally stable/non-actionable, eGFR 76; A1c 7.7  6/24/24: PSA WNL (3.42)  7/1/24: CBC with Hb 12.0; BMP generally stable/non-actionable, eGFR 78; LDL 52; TSH WNL; A1c 7.1  7/24/24: PSA 3.52 (WNL)  9/4/24: CMP with Cr 1.37, GFR 56 (baseline 60s-70s); CBC generally stable/non-actionable, Hb 12.3; blood cultures NGTD; urine culture with no notable growth  10/30/24: BMP generally stable/non-actionable, Cr 1.05, eGFR 77; CBC with Hb 11.7; A1c 8.4  12/20/24: vit D 30, B12 301  12/26/24: respiratory viral panel " "negative  12/27/24: CBC with Hb 11.9 (normocytic); BMP with Cr 1.19, eGFR 66  1/29/25: CBC with Hb 12.2; BMP with Cr 1.02, GFR 80; A1c 8.4  3/26/25: CBC with Hb 10.7 (normocytic); CMP with Cr 1.11, GFR 72; TSH WNL; B12 WNL, folate WNL, MMA WNL; syphilis nonreactive  4/30/25: CBC with Hb 12.6; BMP with GFR 80; A1c 9.2      Lab Results   Component Value Date    HGBA1C 7.1 (H) 07/01/2024     Lab Results   Component Value Date    TSH 2.25 07/01/2024     No results found for: \"RRAORMVP20\"  Lab Results   Component Value Date    FERRITIN 339 (H) 04/28/2020     No results found for: \"CHOLESTEROL\"  No results found for: \"HDL\"  No results found for: \"TRIG\"  No results found for: \"NONHDLC\"  No results found for: \"LDLCALC\"        FL barium swallow video w speech  Result Date: 2/3/2025  IMPRESSION: Penetration of thin consistency. No aspiration.     CTA abdomen pelvis w wo contrast  Result Date: 9/4/2024  1. No acute mass nor inflammatory change in the abdomen. Solid organs grossly stable. No evidence of abscess given history 2. Chronic circumferential distal esophageal wall thickening. Please correlate for chronic reflux esophagitis. 3. Chronic marked rectal fecal retention/possible impaction. Mass effect on the bladder. 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 4. Small enhancing nodule at the bladder base in the region of the trigone unchanged from prior see above comments.     CT head wo contrast  Result Date: 9/4/2024  1.  No acute intracranial findings: No large acute transcortical infarct or intracranial hemorrhage. 2.  Mild white matter changes, which are nonspecific commonly seen with chronic microvascular ischemic change.    XR chest portable  Result Date: 9/4/2024  Impression: No acute pathology visualized.           Echo Oct 2022: EF 60-65, normal diastolic function  EKG 12/20/24 \"sinus rhythm. Normal ECG. Intervals: HR: 75 bpm IA: 123 ms QRS: 101 ms QT: 383 ms RR: 800 ms QTC: 430 ms Axes: P: 34 Deg QRS: 15 Deg T: 37 " "Deg\"       The following portions of the patient's history were reviewed and updated as appropriate: allergies, current medications, past family history, past medical history, past social history, past surgical history and problem list.    Review of Systems     Review of Systems   Constitutional:  Negative for activity change, appetite change, chills, diaphoresis, fatigue and fever.   HENT:  Negative for drooling, ear pain, hearing loss, rhinorrhea, sore throat and trouble swallowing.    Eyes:  Negative for pain, discharge, redness, itching and visual disturbance.   Respiratory:  Negative for cough, chest tightness, shortness of breath and wheezing.    Cardiovascular:  Negative for chest pain, palpitations and leg swelling (mild, baseline).   Gastrointestinal:  Negative for abdominal pain, blood in stool, constipation, diarrhea, nausea and vomiting.   Genitourinary:  Negative for difficulty urinating, dysuria, flank pain and hematuria.   Musculoskeletal:  Positive for arthralgias (chronic R knee) and gait problem. Negative for back pain and neck pain.   Skin:  Negative for color change.   Neurological:  Positive for tremors (stable). Negative for dizziness, facial asymmetry, speech difficulty, weakness, light-headedness (none acute; chronic intermittent orthostatic lightheadedness if rising too fast) and headaches.   Psychiatric/Behavioral:  Negative for agitation, behavioral problems and confusion. The patient is not nervous/anxious and is not hyperactive.        Active Problem List     Patient Active Problem List   Diagnosis    Ambulatory dysfunction    Blurry vision, right eye    Hyperlipidemia    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HCC)    Orthostatic hypotension    Peripheral vascular disease, unspecified (HCC)    Type 1 diabetes mellitus with hyperglycemia (HCC)    Anxiety    Reactive depression    Coronary artery disease without angina pectoris    History of DVT (deep vein thrombosis)    Hyperlipidemia due to " type 1 diabetes mellitus  (HCC)    Other constipation    CKD (chronic kidney disease) stage 2, GFR 60-89 ml/min    Hx of non-ST elevation myocardial infarction (NSTEMI)    Mild cognitive disorder    Primary insomnia    Benign prostatic hyperplasia with nocturia    Advance care planning    Falls    Delusion (HCC)    Colonoscopy refused    Chronic pain of right knee       Objective     Vital Signs:     BP: 111/66 mmHg  6/6/2025 16:43 Temp:97.9 °F  4/23/2025 23:38 Pulse:86 bpm  6/6/2025 09:54 Weight:196.5 Lbs  6/4/2025 02:58     Resp:26 Breaths/min  4/23/2025 23:38 BS:110 mg/dL  6/6/2025 20:24 O2:98 %  4/23/2025 23:38 Pain:0  6/6/2025 23:46       Physical Exam  Vitals reviewed.   Constitutional:       General: He is not in acute distress.     Appearance: He is not toxic-appearing or diaphoretic.   HENT:      Head: Normocephalic and atraumatic.      Right Ear: External ear normal.      Left Ear: External ear normal.      Nose: Nose normal. No rhinorrhea.      Mouth/Throat:      Mouth: Mucous membranes are dry.      Pharynx: Oropharynx is clear. No oropharyngeal exudate or posterior oropharyngeal erythema.     Eyes:      General: No scleral icterus.        Right eye: No discharge.         Left eye: No discharge.      Extraocular Movements: Extraocular movements intact.      Conjunctiva/sclera: Conjunctivae normal.      Pupils: Pupils are equal, round, and reactive to light.       Cardiovascular:      Rate and Rhythm: Normal rate and regular rhythm.   Pulmonary:      Effort: Pulmonary effort is normal. No respiratory distress.      Breath sounds: No wheezing or rales.   Abdominal:      General: Bowel sounds are normal. There is no distension.      Palpations: Abdomen is soft.      Tenderness: There is no abdominal tenderness. There is no guarding.     Musculoskeletal:         General: No tenderness.      Cervical back: No rigidity.      Comments: Trace bilateral LE edema  No calf tenderness     Skin:     General: Skin is  warm and dry.      Coloration: Skin is not jaundiced.     Neurological:      General: No focal deficit present.      Mental Status: He is alert and oriented to person, place, and time. Mental status is at baseline.     Psychiatric:         Mood and Affect: Mood normal.         Behavior: Behavior normal.         Thought Content: Thought content normal.         Pertinent Laboratory/Diagnostic Studies:  Laboratory and Imaging studies reviewed. Full report in the paper chart.     Current Medications   Medications reviewed and updated in facility chart.      -Total time spent on this encounter today including documentation and workup review, face to face time, history and exam, and documentation/orders, was approximately 35 minutes.  -This note will be copied to [facility] EMR where vitals and medication orders are placed.    Ryan Chester D.O.  Geriatric Medicine  6/7/2025 2:24 AM

## 2025-06-07 NOTE — ASSESSMENT & PLAN NOTE
Following Heme/Onc at [facility], recent visit Feb 2025  Was treated in 2013 with 2-CDA  Completed 2 years of zoledronic acid  Considered stable per Heme/Onc, no evidence of recurrent disease  No apparent acute/associated symptoms presently

## 2025-06-07 NOTE — ASSESSMENT & PLAN NOTE
"Patient with some evident delusions since at least several months mainly focused on the idea that someone I.e. \"the \" or \"the \", someone named \"Axel\", at facility is telling him he can or cannot do certain things such as leave his room at times or else he will not get his food  He otherwise seems to be a fair historian and easily redirectable and does not seem to hold any other major delusions or note any hallucinations or other concerning psychiatric symptoms.  Psych hx mainly significant for anxiety/depression and insomnia  The delusion issue seems stable recently, perhaps a bit improved in fact  Psychiatry following at facility  Presently is on quetiapine 50mg qHS recently started and being gradually titrated as per Psych recommendation  Continue to monitor for acute change - seems stable overall, possibly a bit better overall since being on seroquel, does not seem worse  Supportive care  Consider further workup/intervention in conjunction with Psych team if issue persists/worsens.  Has established with Neurology March 2025 with further workup/neuroimaging pending. Recent labs appearing generally reassuring.  "

## 2025-06-07 NOTE — ASSESSMENT & PLAN NOTE
Lab Results   Component Value Date    HGBA1C 7.1 (H) 07/01/2024     Monitor A1c routinely - recently bit worsened 9.2  Monitor glucose - Sugars remain labile throughout day ranging around low 100s-low 300s. Fasting sugars generally high 100s-200s. Around evening can be below 100 into 80s  Avoid hypoglycemia - prior hypoglycemia improved  Continue insulin aspart with sliding scale TIDAC+HS  Continue insulin aspart, will increase to 18u with breakfast/lunch, 14u with dinner with hold parameters  Continue lantus 12u daily. Will defer increasing this further for now due to some PM hypoglycemia  Adjust/titrate regimen as appropriate with more data  Following Endocrinology outpatient  Follow up with Ophthalmology/Podiatry as appropriate

## 2025-06-07 NOTE — ASSESSMENT & PLAN NOTE
Patient notes chronic R knee region pain, worse with ambulation. Although he reports it as chronic/baseline/stable, he has not mentioned this as a notable concern before. Denies any recent trauma to the area. Exam appears reassuring without signs of infection. Arguably some hypertonic musculature.  Per discussion patient does not feel he needs anything for this pain and will let us know if it worsens.  Will order XR R knee to characterize further/ Suspect arthritic etiology, no recent associated imaging on file  Supportive care  PT/OT as appropriate  Continue to monitor  Consider Orthopedics referral pending the above, may benefit from joint injection

## 2025-07-08 ENCOUNTER — NURSING HOME VISIT (OUTPATIENT)
Dept: GERIATRICS | Facility: OTHER | Age: 70
End: 2025-07-08
Payer: MEDICARE

## 2025-07-08 DIAGNOSIS — I25.2 HX OF NON-ST ELEVATION MYOCARDIAL INFARCTION (NSTEMI): ICD-10-CM

## 2025-07-08 DIAGNOSIS — Z86.718 HISTORY OF DVT (DEEP VEIN THROMBOSIS): ICD-10-CM

## 2025-07-08 DIAGNOSIS — I95.1 ORTHOSTATIC HYPOTENSION: ICD-10-CM

## 2025-07-08 DIAGNOSIS — C96.6 LANGERHAN'S CELL HISTIOCYTOSIS (HCC): ICD-10-CM

## 2025-07-08 DIAGNOSIS — F22 DELUSION (HCC): ICD-10-CM

## 2025-07-08 DIAGNOSIS — R35.1 BENIGN PROSTATIC HYPERPLASIA WITH NOCTURIA: ICD-10-CM

## 2025-07-08 DIAGNOSIS — F09 MILD COGNITIVE DISORDER: ICD-10-CM

## 2025-07-08 DIAGNOSIS — I73.9 PERIPHERAL VASCULAR DISEASE, UNSPECIFIED (HCC): ICD-10-CM

## 2025-07-08 DIAGNOSIS — N18.2 CKD (CHRONIC KIDNEY DISEASE) STAGE 2, GFR 60-89 ML/MIN: ICD-10-CM

## 2025-07-08 DIAGNOSIS — N40.1 BENIGN PROSTATIC HYPERPLASIA WITH NOCTURIA: ICD-10-CM

## 2025-07-08 DIAGNOSIS — E10.65 TYPE 1 DIABETES MELLITUS WITH HYPERGLYCEMIA (HCC): Primary | ICD-10-CM

## 2025-07-08 DIAGNOSIS — F41.9 ANXIETY: ICD-10-CM

## 2025-07-08 DIAGNOSIS — F51.01 PRIMARY INSOMNIA: ICD-10-CM

## 2025-07-08 DIAGNOSIS — E78.2 MIXED HYPERLIPIDEMIA: ICD-10-CM

## 2025-07-08 DIAGNOSIS — I25.10 CORONARY ARTERY DISEASE INVOLVING NATIVE HEART WITHOUT ANGINA PECTORIS, UNSPECIFIED VESSEL OR LESION TYPE: ICD-10-CM

## 2025-07-08 PROBLEM — F32.9 REACTIVE DEPRESSION: Status: RESOLVED | Noted: 2021-01-12 | Resolved: 2025-07-08

## 2025-07-08 PROCEDURE — 99309 SBSQ NF CARE MODERATE MDM 30: CPT | Performed by: STUDENT IN AN ORGANIZED HEALTH CARE EDUCATION/TRAINING PROGRAM

## 2025-07-09 NOTE — ASSESSMENT & PLAN NOTE
Denies any recent notable orthostasis/worsening symptoms. Has mild stable intermittent orthostasis he manages by changing positions slowly  Appears he used to be on midodrine in the past. Monitor for need.  Monitor vitals. BP recently generally stable around 110s-130s systolic  Encourage fall precautions, appropriate DME use, slow positional changes, adequate PO hydration  Following Cardiology outpatient

## 2025-07-09 NOTE — PROGRESS NOTES
[facility]  Facility: [facility]    PROGRESS NOTE  Nursing Home Place of Service: nursing home place of service: POS 32 Unskilled- No Part A Coverage    NAME: Tuan Remy  : 1955 AGE: 70 y.o. SEX: male MRN: [id number]  DATE OF ENCOUNTER: 2025    Assessment and Plan     Problem List Items Addressed This Visit       Hyperlipidemia    lipid panel (23)  cholesterol-109  triglycerides-61  LDL-53  HDL-44  Being treated with a high-intensity statin d/t prior history of a NSTEMI, PVD, and CAD  Continue atorvastatin 80 mg po daily, appears to be tolerating well  Goal LDL <70 per Cardiology         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HCC)    Following Heme/Onc at [facility], recent visit 2025  Was treated in  with 2-CDA  Completed 2 years of zoledronic acid  Considered stable per Heme/Onc, no evidence of recurrent disease  No apparent acute/associated symptoms presently         Orthostatic hypotension    Denies any recent notable orthostasis/worsening symptoms. Has mild stable intermittent orthostasis he manages by changing positions slowly  Appears he used to be on midodrine in the past. Monitor for need.  Monitor vitals. BP recently generally stable around 110s-130s systolic  Encourage fall precautions, appropriate DME use, slow positional changes, adequate PO hydration  Following Cardiology outpatient          Peripheral vascular disease, unspecified (HCC)    Noted history  No apparent acute/associated concerns, seems stable  Monitor skin for breakdown, wounds  Continue aspirin, statin         Type 1 diabetes mellitus with hyperglycemia (HCC) - Primary      Lab Results   Component Value Date    HGBA1C 7.1 (H) 2024     Monitor A1c routinely - recently bit worsened 9.2  Monitor glucose - Sugars remain labile throughout day ranging below 100 up to 300s generally. Fasting sugars generally  100s-200s.  Avoid hypoglycemia - tends to drop below 100 around 12pm or 5pm timeframes on some  days  Continue insulin aspart with sliding scale TIDAC+HS  Continue insulin aspart, will decrease slightly to 14u TIDAC with hold parameters  Continue lantus, decreased to 10u daily due to ongoing intermittent hypoglycemia. (Endocrinology has recommended higher lantus dose but I will defer this for now as I am concerned about his ongoing hypoglycemia and high fall risk)  Adjust/titrate regimen as appropriate with more data. Once the hypoglycemia has improved will then focus on titrating regimen as appropriate to target higher readings at certain times of day.  Following Endocrinology outpatient  Follow up with Ophthalmology/Podiatry as appropriate           Anxiety    Noted hx  Mood stable, good spirits  Monitor off anxiolytic meds  Supportive care  Psych following at facility         Coronary artery disease without angina pectoris    Lexiscan stress test:  1. Infarct involving the left circumflex territory/lateral wall with moderate  sonam-infarct ischemia.  2. Normal left ventricle systolic function with ejection fraction of 55%.      Recent Cardiology visit Feb 2025, considered stable  Avoided beta blocker/ACE-I with hx of orthostatic hypotension.   No acute cardiac complaints, seems stable  Continue aspirin, statin         History of DVT (deep vein thrombosis)    Follows Heme/Onc at [facility]  Hx of unprovoked VTE  To continue Eliquis for AC (lifelong per Hematology recommendation)         CKD (chronic kidney disease) stage 2, GFR 60-89 ml/min    Lab Results   Component Value Date    EGFR 56 (L) 09/04/2024    EGFR 78 07/01/2024    EGFR 76 04/10/2024    CREATININE 1.37 (H) 09/04/2024    CREATININE 1.04 07/01/2024    CREATININE 1.06 04/10/2024     Lab Results   Component Value Date    EGFR 56 (L) 09/04/2024    EGFR 78 07/01/2024    EGFR 76 04/10/2024    CREATININE 1.37 (H) 09/04/2024    CREATININE 1.04 07/01/2024    CREATININE 1.06 04/10/2024     Monitor renal function on routine labs - stable in baseline range  Avoid  "nephrotoxins, NSAIDs as able  Encourage PO hydration, respecting volume status           Hx of non-ST elevation myocardial infarction (NSTEMI)    See plan under CAD          Mild cognitive disorder    Noted hx of cognitive impairment  Patient Aox3 and seems to be a fair historian though a bit forgetful and with some mild paranoia in his statements at times, mentation seems stable  May require redirection and reorientation  Encourage participation with group activities  Encourage staying mentally active with word searches, crossword puzzles, and suduko   Avoid deliriogenic medications   Continue 24/7 supportive care at [facility]  Psych team following at facility  Has established with Neurology as of March 2025 with further neuro workup pending         Primary insomnia    Stable  Continue melatonin  Recently started on quetiapine as per Psych  Trazodone was tried and weaned off as seemed ineffective  Supportive care, sleep hygiene         Benign prostatic hyperplasia with nocturia    BPH with LUTS  Following Urology outpatient, recent visit Oct 2024  Started on flomax 0.4mg in Jan 2024  Monitor PSA routinely - WNL though some mild uptrend noted, continue to monitor, as per Urology check yearly before next Urology visit  Seems symptomatically stable  CT abdomen pelvis completed on 09/04 during ED evaluation  Small enhancing nodule at the bladder base in the region of the trigone unchanged from prior         Delusion (HCC)    Patient with some evident delusions since at least several months mainly focused on the idea that someone I.e. \"the \" or \"the \", someone named \"Axel\", at facility is telling him he can or cannot do certain things such as leave his room at times or else he will not get his food  He otherwise seems to be a fair historian and easily redirectable and does not seem to hold any other major delusions or note any hallucinations or other concerning psychiatric symptoms.  Psych hx mainly significant " for anxiety/depression and insomnia  The delusion issue seems stable recently  Psychiatry following at facility  Presently is on quetiapine 50mg qHS recently started and being gradually titrated as per Psych recommendation  Continue to monitor for acute change - seems stable overall, possibly a bit better overall since being on seroquel, does not seem worse  Supportive care  Consider further workup/intervention in conjunction with Psych team if issue persists/worsens.  Has established with Neurology March 2025 with further workup/neuroimaging pending. Recent labs appearing generally reassuring.                                      Chief Complaint     Follow up 60 day    History of Present Illness     Tuan Remy is a 70 y.o. male who was seen today for follow up. PMH including type 1 DM, orthostatic hypotension, peripheral vascular disease, atherosclerotic heart disease, hyperlipidemia, LCH, hyperlipidemia   Code Status: Full     Patient seen and examined in room  Others present: none  Patient laying in bed (and I have observed him ambulating independently in [place] with walker recently)  Appears comfortable, awake, alert, oriented to situation, able to converse appropriately  Polite, Aox3 though a bit forgetful of details, in good spirits, appearing similar to my prior visit in terms of mentation. Notes he feels well overall, with no acute concerns recently.We discussed his sugars which at times have been low/labile and he does get hypoglycemic symptoms when this happens, see plan.  No acute pain anywhere; he has some chronic bilateral knee pain worse with ambulation but stable/OK overall, does not feel he needs anything more for pain. Endorses good appetite, no recent sore throat or dysphagia (prior concern has been resolved without recurrence). Denies any acute GI symptoms, endorses regular BM, no abd pain, no N/V/C/D. Breathing fine, no orthopnea/SOB/cough. No CP/palpitations; he has chronic intermittent  "orthostatic lightheadedness which is mild/stable,he is aware to get up slowly. No acute cardiac, abdominal, or urinary symptoms; see ROS for more details. He does not feel feverish or sick or confused. Gets around mainly with walker and feels steady with this. Feels his mood is good/stable lately. He does again make some mildly paranoid/delusional statements to the effect that there are various people at the facility who do not allow him to leave his room or go to the bathroom when he wants.      No further questions or acute concerns identified.  No further acute concerns per nursing. Per nursing patient with stable occasional delusional statements and no noted aggressive behaviors.     Lab Review:   9/14/23: A1c 7.5 (improved from 7.9 in July 2023)  12/28/23: PSA WNL (2.45)  1/29/24: CBC with Hb 11.8; CMP generally non-actionable, glu 50, eGFR 75; last TSH WNL from April 2023  4/10/24: CBC with Hb 11.4; BMP generally stable/non-actionable, eGFR 76; A1c 7.7  6/24/24: PSA WNL (3.42)  7/1/24: CBC with Hb 12.0; BMP generally stable/non-actionable, eGFR 78; LDL 52; TSH WNL; A1c 7.1  7/24/24: PSA 3.52 (WNL)  9/4/24: CMP with Cr 1.37, GFR 56 (baseline 60s-70s); CBC generally stable/non-actionable, Hb 12.3; blood cultures NGTD; urine culture with no notable growth  10/30/24: BMP generally stable/non-actionable, Cr 1.05, eGFR 77; CBC with Hb 11.7; A1c 8.4  12/20/24: vit D 30, B12 301  12/26/24: respiratory viral panel negative  12/27/24: CBC with Hb 11.9 (normocytic); BMP with Cr 1.19, eGFR 66  1/29/25: CBC with Hb 12.2; BMP with Cr 1.02, GFR 80; A1c 8.4  3/26/25: CBC with Hb 10.7 (normocytic); CMP with Cr 1.11, GFR 72; TSH WNL; B12 WNL, folate WNL, MMA WNL; syphilis nonreactive  4/30/25: CBC with Hb 12.6; BMP with GFR 80; A1c 9.2      Lab Results   Component Value Date    HGBA1C 7.1 (H) 07/01/2024     Lab Results   Component Value Date    TSH 2.25 07/01/2024     No results found for: \"YBBKYPUZ64\"  Lab Results   Component " "Value Date    FERRITIN 339 (H) 04/28/2020     No results found for: \"CHOLESTEROL\"  No results found for: \"HDL\"  No results found for: \"TRIG\"  No results found for: \"NONHDLC\"  No results found for: \"LDLCALC\"    XR L knee 6/10/25 \"No acute fracture or dislocation. The osseous structures appear intact. Joint spaces are preserved. Soft tissues are unremarkable.\"    FL barium swallow video w speech  Result Date: 2/3/2025  IMPRESSION: Penetration of thin consistency. No aspiration.     CTA abdomen pelvis w wo contrast  Result Date: 9/4/2024  1. No acute mass nor inflammatory change in the abdomen. Solid organs grossly stable. No evidence of abscess given history 2. Chronic circumferential distal esophageal wall thickening. Please correlate for chronic reflux esophagitis. 3. Chronic marked rectal fecal retention/possible impaction. Mass effect on the bladder. 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 4. Small enhancing nodule at the bladder base in the region of the trigone unchanged from prior see above comments.     CT head wo contrast  Result Date: 9/4/2024  1.  No acute intracranial findings: No large acute transcortical infarct or intracranial hemorrhage. 2.  Mild white matter changes, which are nonspecific commonly seen with chronic microvascular ischemic change.    XR chest portable  Result Date: 9/4/2024  Impression: No acute pathology visualized.           Echo Oct 2022: EF 60-65, normal diastolic function  EKG 12/20/24 \"sinus rhythm. Normal ECG. Intervals: HR: 75 bpm MO: 123 ms QRS: 101 ms QT: 383 ms RR: 800 ms QTC: 430 ms Axes: P: 34 Deg QRS: 15 Deg T: 37 Deg\"       The following portions of the patient's history were reviewed and updated as appropriate: allergies, current medications, past family history, past medical history, past social history, past surgical history and problem list.    Review of Systems     Review of Systems   Constitutional:  Negative for activity change, appetite change, chills, diaphoresis, " fatigue and fever.   HENT:  Negative for drooling, ear pain, hearing loss, rhinorrhea, sore throat and trouble swallowing.    Eyes:  Negative for pain, discharge, redness, itching and visual disturbance.   Respiratory:  Negative for cough, chest tightness, shortness of breath and wheezing.    Cardiovascular:  Negative for chest pain, palpitations and leg swelling (mild, baseline).   Gastrointestinal:  Negative for abdominal pain, blood in stool, constipation, diarrhea, nausea and vomiting.   Genitourinary:  Negative for difficulty urinating, dysuria, flank pain and hematuria.   Musculoskeletal:  Positive for arthralgias (chronic R knee) and gait problem. Negative for back pain and neck pain.   Skin:  Negative for color change.   Neurological:  Positive for tremors (stable). Negative for dizziness, facial asymmetry, speech difficulty, weakness, light-headedness (none acute; chronic intermittent orthostatic lightheadedness if rising too fast) and headaches.   Psychiatric/Behavioral:  Negative for agitation, behavioral problems and confusion. The patient is not nervous/anxious and is not hyperactive.        Active Problem List     Patient Active Problem List   Diagnosis    Ambulatory dysfunction    Blurry vision, right eye    Hyperlipidemia    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HCC)    Orthostatic hypotension    Peripheral vascular disease, unspecified (HCC)    Type 1 diabetes mellitus with hyperglycemia (HCC)    Anxiety    Coronary artery disease without angina pectoris    History of DVT (deep vein thrombosis)    Hyperlipidemia due to type 1 diabetes mellitus  (HCC)    Other constipation    CKD (chronic kidney disease) stage 2, GFR 60-89 ml/min    Hx of non-ST elevation myocardial infarction (NSTEMI)    Mild cognitive disorder    Primary insomnia    Benign prostatic hyperplasia with nocturia    Advance care planning    Falls    Delusion (HCC)    Colonoscopy refused    Chronic pain of right knee       Objective     Vital  Signs:     BP: 148/72 mmHg  7/4/2025 16:29 Temp:97.9 °F  4/23/2025 23:38 Pulse:77 bpm  7/4/2025 08:32 Weight:196.5 Lbs  6/4/2025 02:58     Resp:26 Breaths/min  4/23/2025 23:38 BS:166 mg/dL  7/8/2025 20:54 O2:98 %  4/23/2025 23:38 Pain:0  7/8/2025 15:45       Physical Exam  Vitals reviewed.   Constitutional:       General: He is not in acute distress.     Appearance: He is not toxic-appearing or diaphoretic.   HENT:      Head: Normocephalic and atraumatic.      Right Ear: External ear normal.      Left Ear: External ear normal.      Nose: Nose normal. No rhinorrhea.      Mouth/Throat:      Mouth: Mucous membranes are dry.      Pharynx: Oropharynx is clear. No oropharyngeal exudate or posterior oropharyngeal erythema.     Eyes:      General: No scleral icterus.        Right eye: No discharge.         Left eye: No discharge.      Extraocular Movements: Extraocular movements intact.      Conjunctiva/sclera: Conjunctivae normal.      Pupils: Pupils are equal, round, and reactive to light.       Cardiovascular:      Rate and Rhythm: Normal rate and regular rhythm.   Pulmonary:      Effort: Pulmonary effort is normal. No respiratory distress.      Breath sounds: No wheezing or rales.   Abdominal:      General: Bowel sounds are normal. There is no distension.      Palpations: Abdomen is soft.      Tenderness: There is no abdominal tenderness. There is no guarding.     Musculoskeletal:         General: No tenderness.      Cervical back: No rigidity.      Comments: Trace bilateral LE edema  No calf tenderness     Skin:     General: Skin is warm and dry.      Coloration: Skin is not jaundiced.     Neurological:      General: No focal deficit present.      Mental Status: He is alert and oriented to person, place, and time. Mental status is at baseline.     Psychiatric:         Mood and Affect: Mood normal.         Behavior: Behavior normal.         Thought Content: Thought content normal.         Pertinent Laboratory/Diagnostic  Studies:  Laboratory and Imaging studies reviewed. Full report in the paper chart.     Current Medications   Medications reviewed and updated in facility chart.      -Total time spent on this encounter today including documentation and workup review, face to face time, history and exam, and documentation/orders, was approximately 40 minutes.  -This note will be copied to [facility] EMR where vitals and medication orders are placed.    Ryan Chester D.O.  Geriatric Medicine  7/8/2025 9:55 PM

## 2025-07-09 NOTE — ASSESSMENT & PLAN NOTE
Following Heme/Onc at [facility], recent visit Feb 2025  Was treated in 2013 with 2-CDA  Completed 2 years of zoledronic acid  Considered stable per Heme/Onc, no evidence of recurrent disease  No apparent acute/associated symptoms presently

## 2025-07-09 NOTE — ASSESSMENT & PLAN NOTE
"Patient with some evident delusions since at least several months mainly focused on the idea that someone I.e. \"the \" or \"the \", someone named \"Axel\", at facility is telling him he can or cannot do certain things such as leave his room at times or else he will not get his food  He otherwise seems to be a fair historian and easily redirectable and does not seem to hold any other major delusions or note any hallucinations or other concerning psychiatric symptoms.  Psych hx mainly significant for anxiety/depression and insomnia  The delusion issue seems stable recently  Psychiatry following at facility  Presently is on quetiapine 50mg qHS recently started and being gradually titrated as per Psych recommendation  Continue to monitor for acute change - seems stable overall, possibly a bit better overall since being on seroquel, does not seem worse  Supportive care  Consider further workup/intervention in conjunction with Psych team if issue persists/worsens.  Has established with Neurology March 2025 with further workup/neuroimaging pending. Recent labs appearing generally reassuring.  "

## 2025-07-09 NOTE — ASSESSMENT & PLAN NOTE
Lab Results   Component Value Date    HGBA1C 7.1 (H) 07/01/2024     Monitor A1c routinely - recently bit worsened 9.2  Monitor glucose - Sugars remain labile throughout day ranging below 100 up to 300s generally. Fasting sugars generally  100s-200s.  Avoid hypoglycemia - tends to drop below 100 around 12pm or 5pm timeframes on some days  Continue insulin aspart with sliding scale TIDAC+HS  Continue insulin aspart, will decrease slightly to 14u TIDAC with hold parameters  Continue lantus, decreased to 10u daily due to ongoing intermittent hypoglycemia. (Endocrinology has recommended higher lantus dose but I will defer this for now as I am concerned about his ongoing hypoglycemia and high fall risk)  Adjust/titrate regimen as appropriate with more data. Once the hypoglycemia has improved will then focus on titrating regimen as appropriate to target higher readings at certain times of day.  Following Endocrinology outpatient  Follow up with Ophthalmology/Podiatry as appropriate

## 2025-07-27 ENCOUNTER — TELEPHONE (OUTPATIENT)
Dept: OTHER | Facility: OTHER | Age: 70
End: 2025-07-27

## 2025-08-08 ENCOUNTER — TELEPHONE (OUTPATIENT)
Dept: OTHER | Facility: OTHER | Age: 70
End: 2025-08-08

## 2025-08-08 ENCOUNTER — NURSING HOME VISIT (OUTPATIENT)
Dept: GERIATRICS | Facility: OTHER | Age: 70
End: 2025-08-08
Payer: MEDICARE

## 2025-08-17 ENCOUNTER — TELEPHONE (OUTPATIENT)
Dept: OTHER | Facility: OTHER | Age: 70
End: 2025-08-17